# Patient Record
Sex: MALE | Race: WHITE | NOT HISPANIC OR LATINO | Employment: FULL TIME | URBAN - METROPOLITAN AREA
[De-identification: names, ages, dates, MRNs, and addresses within clinical notes are randomized per-mention and may not be internally consistent; named-entity substitution may affect disease eponyms.]

---

## 2017-01-24 ENCOUNTER — OFFICE VISIT (OUTPATIENT)
Dept: CARDIOLOGY | Facility: MEDICAL CENTER | Age: 52
End: 2017-01-24
Payer: COMMERCIAL

## 2017-01-24 VITALS
WEIGHT: 278 LBS | HEIGHT: 75 IN | SYSTOLIC BLOOD PRESSURE: 110 MMHG | HEART RATE: 72 BPM | OXYGEN SATURATION: 95 % | BODY MASS INDEX: 34.57 KG/M2 | DIASTOLIC BLOOD PRESSURE: 74 MMHG

## 2017-01-24 DIAGNOSIS — I50.30: ICD-10-CM

## 2017-01-24 DIAGNOSIS — F14.90 COCAINE USE: ICD-10-CM

## 2017-01-24 DIAGNOSIS — Z78.9 ALCOHOL USE: ICD-10-CM

## 2017-01-24 DIAGNOSIS — I50.20 ACC/AHA STAGE C SYSTOLIC HEART FAILURE (HCC): ICD-10-CM

## 2017-01-24 PROCEDURE — 99214 OFFICE O/P EST MOD 30 MIN: CPT | Performed by: NURSE PRACTITIONER

## 2017-01-24 RX ORDER — LISINOPRIL 20 MG/1
20 TABLET ORAL DAILY
Qty: 30 TAB | Refills: 11 | Status: SHIPPED | OUTPATIENT
Start: 2017-01-24 | End: 2017-01-24 | Stop reason: SDUPTHER

## 2017-01-24 RX ORDER — CARVEDILOL 3.12 MG/1
3.12 TABLET ORAL 2 TIMES DAILY WITH MEALS
Qty: 180 TAB | Refills: 3 | Status: SHIPPED | OUTPATIENT
Start: 2017-01-24 | End: 2018-04-10 | Stop reason: SDUPTHER

## 2017-01-24 RX ORDER — LISINOPRIL 20 MG/1
20 TABLET ORAL DAILY
Qty: 90 TAB | Refills: 3 | Status: SHIPPED | OUTPATIENT
Start: 2017-01-24 | End: 2017-02-27

## 2017-01-24 RX ORDER — CARVEDILOL 3.12 MG/1
3.12 TABLET ORAL 2 TIMES DAILY WITH MEALS
Qty: 60 TAB | Refills: 1 | Status: SHIPPED | OUTPATIENT
Start: 2017-01-24 | End: 2017-01-24 | Stop reason: SDUPTHER

## 2017-01-24 ASSESSMENT — ENCOUNTER SYMPTOMS
SHORTNESS OF BREATH: 0
DIZZINESS: 0
ABDOMINAL PAIN: 0
COUGH: 0
ORTHOPNEA: 0
MYALGIAS: 0
PALPITATIONS: 0
PND: 0
FEVER: 0
CLAUDICATION: 0

## 2017-01-24 NOTE — PROGRESS NOTES
Subjective:   Milton Stewart is a 51 y.o. male who presents today for f/u on Heart failure.    Patients feels well today, denies chest pain, shortness of breath, palpitations, dizziness/lightheadedness, orthopnea, PND or Edema.     Pt was complaining of some lightheadedness and fatigue and went to see his PCP last week, who told pt to stop taking his carvedilol. Pt reports his SBP was in the 90s in the office. Pt states the lightheadedness was not consistent and he attributes the symptoms to possibly due to over exercising and not having enough nutrition. Pt does not have a BP cuff and was not checking BPs at home with the lightheadeness episodes. Pt was taking his carvedilol 3.125mg daily instead of twice a day.     Pt has started exercising in the past month, doing cardio/walking 6-7 days/week and then weight lifting on Mondays/Wednesdays/Fridays. Pt denies any chest pain, SOB, palpitations with exercise. Pt feels better everyday.     Past Medical History   Diagnosis Date   • ASTHMA    • ETOH abuse      Past Surgical History   Procedure Laterality Date   • Monse by laparoscopy  2005   • Tonsillectomy  1974     Family History   Problem Relation Age of Onset   • Heart Disease Mother    • Diabetes Mother    • Kidney Disease Mother      on dialysis   • Heart Disease Father    • Other Father      mechanical injuries   • Stroke Brother    • Heart Disease Brother      History   Smoking status   • Never Smoker    Smokeless tobacco   • Not on file     No Known Allergies  Outpatient Encounter Prescriptions as of 1/24/2017   Medication Sig Dispense Refill   • lisinopril (PRINIVIL) 20 MG Tab Take 1 Tab by mouth every day. 90 Tab 3   • carvedilol (COREG) 3.125 MG Tab Take 1 Tab by mouth 2 times a day, with meals. 180 Tab 3   • ALBUTEROL INH Inhale  by mouth.     • nitroglycerin (NITROSTAT) 0.4 MG SL Tab Place 1 Tab under tongue as needed for Chest Pain (Up to 3 doses (if SBP greater than 90 mmHg)). 25 Tab 1   •  "fluticasone (FLOVENT HFA) 110 MCG/ACT Aerosol Inhale 2 Puffs by mouth 2 times a day.     • esomeprazole (NEXIUM) 20 MG capsule Take 20 mg by mouth as needed (For heartburn).     • acetaminophen (TYLENOL) 500 MG Tab Take 1,500 mg by mouth every 6 hours as needed for Moderate Pain.     • alprazolam (XANAX) 1 MG Tab Take 0.5-1 mg by mouth at bedtime as needed for Anxiety.     • [DISCONTINUED] carvedilol (COREG) 3.125 MG Tab Take 1 Tab by mouth 2 times a day, with meals. 60 Tab 1   • [DISCONTINUED] lisinopril (PRINIVIL) 20 MG Tab Take 1 Tab by mouth every day. 30 Tab 11   • [DISCONTINUED] lisinopril (PRINIVIL) 20 MG Tab Take 1 Tab by mouth every day. 30 Tab 11   • aspirin 81 MG EC tablet Take 1 Tab by mouth every day. 100 Tab 0   • [DISCONTINUED] carvedilol (COREG) 3.125 MG Tab Take 1 Tab by mouth 2 times a day, with meals. 60 Tab 1     No facility-administered encounter medications on file as of 1/24/2017.     Review of Systems   Constitutional: Positive for malaise/fatigue. Negative for fever.   Respiratory: Negative for cough and shortness of breath.    Cardiovascular: Negative for chest pain, palpitations, orthopnea, claudication, leg swelling and PND.   Gastrointestinal: Negative for abdominal pain.   Musculoskeletal: Negative for myalgias.   Neurological: Negative for dizziness.        Lightheadedness-occassionally   All other systems reviewed and are negative.       Objective:   /74 mmHg  Pulse 72  Ht 1.905 m (6' 3\")  Wt 126.1 kg (278 lb)  BMI 34.75 kg/m2  SpO2 95%    Physical Exam   Constitutional: He is oriented to person, place, and time. He appears well-developed and well-nourished.   HENT:   Head: Normocephalic and atraumatic.   Eyes: EOM are normal.   Neck: Normal range of motion. Neck supple. No JVD present.   Cardiovascular: Normal rate, regular rhythm, normal heart sounds and intact distal pulses.    No murmur heard.  Pulmonary/Chest: Effort normal and breath sounds normal. No respiratory " distress. He has no wheezes. He has no rales.   Abdominal: Soft. Bowel sounds are normal.   Musculoskeletal: Normal range of motion. He exhibits no edema.   Neurological: He is alert and oriented to person, place, and time.   Skin: Skin is warm and dry.   Psychiatric: He has a normal mood and affect.   Nursing note and vitals reviewed.    Lab Results   Component Value Date/Time    CHOLESTEROL, 12/13/2016 03:55 AM    LDL 78 12/13/2016 03:55 AM    HDL 43 12/13/2016 03:55 AM    TRIGLYCERIDES 105 12/13/2016 03:55 AM       Lab Results   Component Value Date/Time    SODIUM 137 12/13/2016 02:24 PM    POTASSIUM 3.7 12/13/2016 02:24 PM    CHLORIDE 103 12/13/2016 02:24 PM    CO2 27 12/13/2016 02:24 PM    GLUCOSE 116* 12/13/2016 02:24 PM    BUN 11 12/13/2016 02:24 PM    CREATININE 1.08 12/13/2016 02:24 PM     Lab Results   Component Value Date/Time    ALKALINE PHOSPHATASE 49 12/12/2016 09:22 AM    AST(SGOT) 32 12/12/2016 09:22 AM    ALT(SGPT) 29 12/12/2016 09:22 AM    TOTAL BILIRUBIN 1.4 12/12/2016 09:22 AM      Transthoracic Echo Report 12/13/16    Mildly reduced left ventricular systolic function.  Left ventricular ejection fraction is visually estimated to be 45%.  Moderately dilated right ventricle.  Reduced right ventricular systolic function.  The aortic valve is not well visualized but appears to open normally.  Unable to estimate pulmonary artery pressure due to an inadequate   tricuspid regurgitant jet.  Ascending aorta not well visualized.    Myocardial Perfusion Report 12/12/16      Normal myocardial perfusion with no ischemia.   Normal left ventricular wall motion.  LV ejection fraction = 48%.   ECG INTERPRETATION   Negative stress ECG for ischemia.      Assessment:     1. ACC/AHA stage C systolic heart failure (CMS-HCC)  lisinopril (PRINIVIL) 20 MG Tab    carvedilol (COREG) 3.125 MG Tab    DISCONTINUED: carvedilol (COREG) 3.125 MG Tab    DISCONTINUED: lisinopril (PRINIVIL) 20 MG Tab   2. NYHA class 1 heart  failure with borderline preserved ejection fraction (CMS-HCC)  lisinopril (PRINIVIL) 20 MG Tab    carvedilol (COREG) 3.125 MG Tab    DISCONTINUED: carvedilol (COREG) 3.125 MG Tab    DISCONTINUED: lisinopril (PRINIVIL) 20 MG Tab   3. Cocaine use     4. Alcohol use (HCC)         Medical Decision Making:  Today's Assessment / Status / Plan:   Based on physical examination findings, patient is euvolemic. No JVD, lungs are clear to auscultation, no pitting edema in bilateral lower extremities, no ascites.    1. CHF, EF 45%, Stage C, NYHA class 1:   -Restart Coreg 3.125mg BID  -cont Lisinopril 20 mg Daily  -Take BP at home and monitor for symptoms, call if problems/symptoms occur. SBP ok in the 90s if not symptoms present.  -Educated pt on the importance of medications in the management of heart failure.      2.ETOH/Cocaine use: Pt maintains sobriety since d/c from hospital.    FU in clinic in 1 month with Dr. Tesfaye. Sooner if needed.    Patient verbalizes understanding and agrees with the plan of care.     Collaborating MD: Gomez Tesfaye MD.

## 2017-01-24 NOTE — MR AVS SNAPSHOT
"        Milton Espanauhmacher   2017 8:40 AM   Office Visit   MRN: [id number]    Department:  [facility]   Dept Phone:  [phone]    Description:  Male : 1965   Provider:  BHARGAVI Ashton           Reason for Visit     Follow-Up           Allergies as of 2017     No Known Allergies      You were diagnosed with     Left ventricular systolic dysfunction   [384330]       Cocaine use   [738746]       Alcohol use (HCC)   [801614]       ACC/AHA stage C systolic heart failure (CMS-HCC)   [6372471]       NYHA class 1 heart failure with borderline preserved ejection fraction (CMS-HCC)   [9352165]         Vital Signs     Blood Pressure Pulse Height Weight Body Mass Index Oxygen Saturation    110/74 mmHg 72 1.905 m (6' 3\") 126.1 kg (278 lb) 34.75 kg/m2 95%    Smoking Status                   Never Smoker            Basic Information     Date Of Birth Sex Race Ethnicity Preferred Language    1965 Male White Non- English      Your appointments     2017  9:15 AM   Heart Failure Established with Gomez Tesfaye M.D.   [facility] (--)    [address], [address]  [facility] [id number] [phone]              Problem List              ICD-10-CM Priority Class Noted - Resolved    HTN (hypertension) I10   2016 - Present    Alcohol use (HCC) Z78.9   2016 - Present    Cocaine use F14.10   2016 - Present    Chest pain R07.9   2016 - Present    Hypokalemia E87.6   2016 - Present    Left ventricular systolic dysfunction I51.9   2016 - Present      Health Maintenance        Date Due Completion Dates    IMM DTaP/Tdap/Td Vaccine (1 - Tdap) 1983 ---    COLONOSCOPY 2014 ---    IMM INFLUENZA (1) 2016 ---            Current Immunizations     No immunizations on file.      Below and/or attached are the medications your provider expects you to take. Review all of your home medications and newly " ordered medications with your provider and/or pharmacist. Follow medication instructions as directed by your provider and/or pharmacist. Please keep your medication list with you and share with your provider. Update the information when medications are discontinued, doses are changed, or new medications (including over-the-counter products) are added; and carry medication information at all times in the event of emergency situations     Allergies:  No Known Allergies          Medications  Valid as of: January 24, 2017 -  9:32 AM    Generic Name Brand Name Tablet Size Instructions for use    Acetaminophen (Tab) TYLENOL 500 MG Take 1,500 mg by mouth every 6 hours as needed for Moderate Pain.        Albuterol   Inhale  by mouth.        ALPRAZolam (Tab) XANAX 1 MG Take 0.5-1 mg by mouth at bedtime as needed for Anxiety.        Aspirin (Tablet Delayed Response) aspirin 81 MG Take 1 Tab by mouth every day.        Carvedilol (Tab) COREG 3.125 MG Take 1 Tab by mouth 2 times a day, with meals.        Esomeprazole Magnesium (CAPSULE DELAYED RELEASE) NEXIUM 20 MG Take 20 mg by mouth as needed (For heartburn).        Fluticasone Propionate HFA (Aerosol) FLOVENT  MCG/ACT Inhale 2 Puffs by mouth 2 times a day.        Lisinopril (Tab) PRINIVIL 20 MG Take 1 Tab by mouth every day.        Nitroglycerin (SL Tab) NITROSTAT 0.4 MG Place 1 Tab under tongue as needed for Chest Pain (Up to 3 doses (if SBP greater than 90 mmHg)).        .                 Medicines prescribed today were sent to:     [id number] - [place], [place] - [address] [address]    Phone: [phone] Fax: [phone]    Open 24 Hours?: No      Medication refill instructions:       If your prescription bottle indicates you have medication refills left, it is not necessary to call your provider’s office. Please contact your pharmacy and they will refill your medication.    If your prescription bottle indicates you do not  have any refills left, you may request refills at any time through one of the following ways: The online Symcat system (except Urgent Care), by calling your provider’s office, or by asking your pharmacy to contact your provider’s office with a refill request. Medication refills are processed only during regular business hours and may not be available until the next business day. Your provider may request additional information or to have a follow-up visit with you prior to refilling your medication.   *Please Note: Medication refills are assigned a new Rx number when refilled electronically. Your pharmacy may indicate that no refills were authorized even though a new prescription for the same medication is available at the pharmacy. Please request the medicine by name with the pharmacy before contacting your provider for a refill.           Symcat Access Code: [email or web address]  Expires: 2/23/2017  9:32 AM    Symcat  A secure, online tool to manage your health information     [employer]’s Symcat® is a secure, online tool that connects you to your personalized health information from the privacy of your home -- day or night - making it very easy for you to manage your healthcare. Once the activation process is completed, you can even access your medical information using the Symcat zoey, which is available for free in the Apple Zoey store or Google Play store.     Symcat provides the following levels of access (as shown below):   My Chart Features   [facility] Doctor [facility]  Urgent  Care Non-[facility]  Primary Care  Doctor   Email your healthcare team securely and privately 24/7 X X X    Manage appointments: schedule your next appointment; view details of past/upcoming appointments X      Request prescription refills. X      View recent personal medical records, including lab and immunizations X X X X   View health record, including health history, allergies, medications X X X X    Read reports about your outpatient visits, procedures, consult and ER notes X X X X   See your discharge summary, which is a recap of your hospital and/or ER visit that includes your diagnosis, lab results, and care plan. X X       How to register for [facility]:  1. Go to  [email or web address].  2. Click on the Sign Up Now box, which takes you to the New Member Sign Up page. You will need to provide the following information:  a. Enter your [employer] [facility] Access Code exactly as it appears at the top of this page. (You will not need to use this code after you’ve completed the sign-up process. If you do not sign up before the expiration date, you must request a new code.)   b. Enter your date of birth.   c. Enter your home email address.   d. Click Submit, and follow the next screen’s instructions.  3. Create a [facility] ID. This will be your [employer] [facility] login ID and cannot be changed, so think of one that is secure and easy to remember.  4. Create a [facility] password. You can change your password at any time.  5. Enter your Password Reset Question and Answer. This can be used at a later time if you forget your password.   6. Enter your e-mail address. This allows you to receive e-mail notifications when new information is available in [facility].  7. Click Sign Up. You can now view your health information.    For assistance activating your [employer] [facility] account, call [phone]

## 2017-01-24 NOTE — Clinical Note
[facility]   [address]  [place] [address]  Phone: [phone]  Fax: [phone]              Milton Stewart  1965    Encounter Date: 1/24/2017    BHARGAVI Ashton          PROGRESS NOTE:  Subjective:   Milton Stewart is a 51 y.o. male who presents today for f/u on Heart failure.    Patients feels well today, denies chest pain, shortness of breath, palpitations, dizziness/lightheadedness, orthopnea, PND or Edema.     Pt was complaining of some lightheadedness and fatigue and went to see his PCP last week, who told pt to stop taking his carvedilol. Pt reports his SBP was in the 90s in the office. Pt states the lightheadedness was not consistent and he attributes the symptoms to possibly due to over exercising and not having enough nutrition. Pt does not have a BP cuff and was not checking BPs at home with the lightheadeness episodes. Pt was taking his carvedilol 3.125mg daily instead of twice a day.     Pt has started exercising in the past month, doing cardio/walking 6-7 days/week and then weight lifting on Mondays/Wednesdays/Fridays. Pt denies any chest pain, SOB, palpitations with exercise. Pt feels better everyday.     Past Medical History   Diagnosis Date   • ASTHMA    • ETOH abuse      Past Surgical History   Procedure Laterality Date   • Monse by laparoscopy  2005   • Tonsillectomy  1974     Family History   Problem Relation Age of Onset   • Heart Disease Mother    • Diabetes Mother    • Kidney Disease Mother      on dialysis   • Heart Disease Father    • Other Father      mechanical injuries   • Stroke Brother    • Heart Disease Brother      History   Smoking status   • Never Smoker    Smokeless tobacco   • Not on file     No Known Allergies  Outpatient Encounter Prescriptions as of 1/24/2017   Medication Sig Dispense Refill   • lisinopril (PRINIVIL) 20 MG Tab Take 1 Tab by mouth every day. 90 Tab 3   •  "carvedilol (COREG) 3.125 MG Tab Take 1 Tab by mouth 2 times a day, with meals. 180 Tab 3   • ALBUTEROL INH Inhale  by mouth.     • nitroglycerin (NITROSTAT) 0.4 MG SL Tab Place 1 Tab under tongue as needed for Chest Pain (Up to 3 doses (if SBP greater than 90 mmHg)). 25 Tab 1   • fluticasone (FLOVENT HFA) 110 MCG/ACT Aerosol Inhale 2 Puffs by mouth 2 times a day.     • esomeprazole (NEXIUM) 20 MG capsule Take 20 mg by mouth as needed (For heartburn).     • acetaminophen (TYLENOL) 500 MG Tab Take 1,500 mg by mouth every 6 hours as needed for Moderate Pain.     • alprazolam (XANAX) 1 MG Tab Take 0.5-1 mg by mouth at bedtime as needed for Anxiety.     • [DISCONTINUED] carvedilol (COREG) 3.125 MG Tab Take 1 Tab by mouth 2 times a day, with meals. 60 Tab 1   • [DISCONTINUED] lisinopril (PRINIVIL) 20 MG Tab Take 1 Tab by mouth every day. 30 Tab 11   • [DISCONTINUED] lisinopril (PRINIVIL) 20 MG Tab Take 1 Tab by mouth every day. 30 Tab 11   • aspirin 81 MG EC tablet Take 1 Tab by mouth every day. 100 Tab 0   • [DISCONTINUED] carvedilol (COREG) 3.125 MG Tab Take 1 Tab by mouth 2 times a day, with meals. 60 Tab 1     No facility-administered encounter medications on file as of 1/24/2017.     Review of Systems   Constitutional: Positive for malaise/fatigue. Negative for fever.   Respiratory: Negative for cough and shortness of breath.    Cardiovascular: Negative for chest pain, palpitations, orthopnea, claudication, leg swelling and PND.   Gastrointestinal: Negative for abdominal pain.   Musculoskeletal: Negative for myalgias.   Neurological: Negative for dizziness.        Lightheadedness-occassionally   All other systems reviewed and are negative.       Objective:   /74 mmHg  Pulse 72  Ht 1.905 m (6' 3\")  Wt 126.1 kg (278 lb)  BMI 34.75 kg/m2  SpO2 95%    Physical Exam   Constitutional: He is oriented to person, place, and time. He appears well-developed and well-nourished.   HENT:   Head: Normocephalic and " atraumatic.   Eyes: EOM are normal.   Neck: Normal range of motion. Neck supple. No JVD present.   Cardiovascular: Normal rate, regular rhythm, normal heart sounds and intact distal pulses.    No murmur heard.  Pulmonary/Chest: Effort normal and breath sounds normal. No respiratory distress. He has no wheezes. He has no rales.   Abdominal: Soft. Bowel sounds are normal.   Musculoskeletal: Normal range of motion. He exhibits no edema.   Neurological: He is alert and oriented to person, place, and time.   Skin: Skin is warm and dry.   Psychiatric: He has a normal mood and affect.   Nursing note and vitals reviewed.    Lab Results   Component Value Date/Time    CHOLESTEROL, 12/13/2016 03:55 AM    LDL 78 12/13/2016 03:55 AM    HDL 43 12/13/2016 03:55 AM    TRIGLYCERIDES 105 12/13/2016 03:55 AM       Lab Results   Component Value Date/Time    SODIUM 137 12/13/2016 02:24 PM    POTASSIUM 3.7 12/13/2016 02:24 PM    CHLORIDE 103 12/13/2016 02:24 PM    CO2 27 12/13/2016 02:24 PM    GLUCOSE 116* 12/13/2016 02:24 PM    BUN 11 12/13/2016 02:24 PM    CREATININE 1.08 12/13/2016 02:24 PM     Lab Results   Component Value Date/Time    ALKALINE PHOSPHATASE 49 12/12/2016 09:22 AM    AST(SGOT) 32 12/12/2016 09:22 AM    ALT(SGPT) 29 12/12/2016 09:22 AM    TOTAL BILIRUBIN 1.4 12/12/2016 09:22 AM      Transthoracic Echo Report 12/13/16    Mildly reduced left ventricular systolic function.  Left ventricular ejection fraction is visually estimated to be 45%.  Moderately dilated right ventricle.  Reduced right ventricular systolic function.  The aortic valve is not well visualized but appears to open normally.  Unable to estimate pulmonary artery pressure due to an inadequate   tricuspid regurgitant jet.  Ascending aorta not well visualized.    Myocardial Perfusion Report 12/12/16      Normal myocardial perfusion with no ischemia.   Normal left ventricular wall motion.  LV ejection fraction = 48%.   ECG INTERPRETATION   Negative  stress ECG for ischemia.      Assessment:     1. ACC/AHA stage C systolic heart failure (CMS-HCC)  lisinopril (PRINIVIL) 20 MG Tab    carvedilol (COREG) 3.125 MG Tab    DISCONTINUED: carvedilol (COREG) 3.125 MG Tab    DISCONTINUED: lisinopril (PRINIVIL) 20 MG Tab   2. NYHA class 1 heart failure with borderline preserved ejection fraction (CMS-HCC)  lisinopril (PRINIVIL) 20 MG Tab    carvedilol (COREG) 3.125 MG Tab    DISCONTINUED: carvedilol (COREG) 3.125 MG Tab    DISCONTINUED: lisinopril (PRINIVIL) 20 MG Tab   3. Cocaine use     4. Alcohol use (HCC)         Medical Decision Making:  Today's Assessment / Status / Plan:   Based on physical examination findings, patient is euvolemic. No JVD, lungs are clear to auscultation, no pitting edema in bilateral lower extremities, no ascites.    1. CHF, EF 45%, Stage C, NYHA class 1:   -Restart Coreg 3.125mg BID  -cont Lisinopril 20 mg Daily  -Take BP at home and monitor for symptoms, call if problems/symptoms occur. SBP ok in the 90s if not symptoms present.  -Educated pt on the importance of medications in the management of heart failure.      2.ETOH/Cocaine use: Pt maintains sobriety since d/c from hospital.    FU in clinic in 1 month with Dr. Tesfaye. Sooner if needed.    Patient verbalizes understanding and agrees with the plan of care.     Collaborating MD: Gomez Tesfaye MD.      Tam Rg M.D.  [address] [facility]  [address] [place] [id number]  VIA Facsimile: [phone]

## 2017-02-27 ENCOUNTER — OFFICE VISIT (OUTPATIENT)
Dept: CARDIOLOGY | Facility: MEDICAL CENTER | Age: 52
End: 2017-02-27
Payer: COMMERCIAL

## 2017-02-27 VITALS
SYSTOLIC BLOOD PRESSURE: 140 MMHG | BODY MASS INDEX: 33.57 KG/M2 | HEIGHT: 75 IN | HEART RATE: 74 BPM | WEIGHT: 270 LBS | OXYGEN SATURATION: 94 % | DIASTOLIC BLOOD PRESSURE: 82 MMHG

## 2017-02-27 DIAGNOSIS — I51.9 LEFT VENTRICULAR SYSTOLIC DYSFUNCTION: ICD-10-CM

## 2017-02-27 DIAGNOSIS — I10 ESSENTIAL HYPERTENSION: ICD-10-CM

## 2017-02-27 DIAGNOSIS — F14.90 COCAINE USE: ICD-10-CM

## 2017-02-27 DIAGNOSIS — Z78.9 ALCOHOL USE: ICD-10-CM

## 2017-02-27 DIAGNOSIS — E87.6 HYPOKALEMIA: ICD-10-CM

## 2017-02-27 DIAGNOSIS — I20.9 ISCHEMIC CHEST PAIN (HCC): ICD-10-CM

## 2017-02-27 PROCEDURE — 99214 OFFICE O/P EST MOD 30 MIN: CPT | Performed by: INTERNAL MEDICINE

## 2017-02-27 RX ORDER — LISINOPRIL 40 MG/1
40 TABLET ORAL DAILY
Qty: 30 TAB | Refills: 11 | Status: SHIPPED | OUTPATIENT
Start: 2017-02-27 | End: 2018-03-05 | Stop reason: SDUPTHER

## 2017-02-27 ASSESSMENT — ENCOUNTER SYMPTOMS
WEAKNESS: 0
SPUTUM PRODUCTION: 0
HEMOPTYSIS: 0
WHEEZING: 0
COUGH: 0
FEVER: 0
EYES NEGATIVE: 1
PALPITATIONS: 0
CONSTITUTIONAL NEGATIVE: 1
STRIDOR: 0
MUSCULOSKELETAL NEGATIVE: 1
CARDIOVASCULAR NEGATIVE: 1
NEUROLOGICAL NEGATIVE: 1
LOSS OF CONSCIOUSNESS: 0
CLAUDICATION: 0
ORTHOPNEA: 0
PND: 0
CHILLS: 0
SORE THROAT: 0
BRUISES/BLEEDS EASILY: 0
RESPIRATORY NEGATIVE: 1
GASTROINTESTINAL NEGATIVE: 1
DIZZINESS: 0
SHORTNESS OF BREATH: 0

## 2017-02-27 NOTE — Clinical Note
[facility]   [address]  [place] [address]  Phone: [phone]  Fax: [phone]              Milton Stewart  1965    Encounter Date: 2/27/2017    Gomez Tesfaye M.D.          PROGRESS NOTE:  Subjective:   Milton Stewart is a 51 y.o. 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 He is checking his blood pressure home which normally range in the 120-140 range. His pulse ranges 58-68. He has fitbit that he uses to monitor his heart rate. He would like to recheck his ejection fraction at this point. He reports sobriety from illicit substances.    Past Medical History   Diagnosis Date   • ASTHMA    • ETOH abuse      Past Surgical History   Procedure Laterality Date   • Monse by laparoscopy  2005   • Tonsillectomy  1974     Family History   Problem Relation Age of Onset   • Heart Disease Mother    • Diabetes Mother    • Kidney Disease Mother      on dialysis   • Heart Disease Father    • Other Father      mechanical injuries   • Stroke Brother    • Heart Disease Brother      History   Smoking status   • Never Smoker    Smokeless tobacco   • Not on file     No Known Allergies  Outpatient Encounter Prescriptions as of 2/27/2017   Medication Sig Dispense Refill   • lisinopril (PRINIVIL, ZESTRIL) 40 MG tablet Take 1 Tab by mouth every day. 30 Tab 11   • carvedilol (COREG) 3.125 MG Tab Take 1 Tab by mouth 2 times a day, with meals. 180 Tab 3   • aspirin 81 MG EC tablet Take 1 Tab by mouth every day. 100 Tab 0   • fluticasone (FLOVENT HFA) 110 MCG/ACT Aerosol Inhale 2 Puffs by mouth 2 times a day.     • [DISCONTINUED] lisinopril (PRINIVIL) 20 MG Tab Take 1 Tab by mouth every day. 90 Tab 3   • ALBUTEROL INH Inhale  by mouth.     • nitroglycerin (NITROSTAT) 0.4 MG SL Tab Place 1 Tab under tongue as needed for Chest Pain (Up to 3 doses (if SBP greater than 90 mmHg)). 25 Tab 1   • esomeprazole (NEXIUM)  "20 MG capsule Take 20 mg by mouth as needed (For heartburn).     • acetaminophen (TYLENOL) 500 MG Tab Take 1,500 mg by mouth every 6 hours as needed for Moderate Pain.     • alprazolam (XANAX) 1 MG Tab Take 0.5-1 mg by mouth at bedtime as needed for Anxiety.       No facility-administered encounter medications on file as of 2/27/2017.     Review of Systems   Constitutional: Negative.  Negative for fever, chills and malaise/fatigue.   HENT: Negative.  Negative for sore throat.    Eyes: Negative.    Respiratory: Negative.  Negative for cough, hemoptysis, sputum production, shortness of breath, wheezing and stridor.    Cardiovascular: Negative.  Negative for chest pain, palpitations, orthopnea, claudication, leg swelling and PND.   Gastrointestinal: Negative.    Genitourinary: Negative.    Musculoskeletal: Negative.    Skin: Negative.    Neurological: Negative.  Negative for dizziness, loss of consciousness and weakness.   Endo/Heme/Allergies: Negative.  Does not bruise/bleed easily.   All other systems reviewed and are negative.       Objective:   /82 mmHg  Pulse 74  Ht 1.905 m (6' 3\")  Wt 122.471 kg (270 lb)  BMI 33.75 kg/m2  SpO2 94%    Physical Exam   Constitutional: He is oriented to person, place, and time. He appears well-developed and well-nourished. No distress.   HENT:   Head: Normocephalic.   Mouth/Throat: Oropharynx is clear and moist.   Eyes: EOM are normal. Pupils are equal, round, and reactive to light. Right eye exhibits no discharge. Left eye exhibits no discharge. No scleral icterus.   Neck: Normal range of motion. Neck supple. No JVD present. No tracheal deviation present.   Cardiovascular: Normal rate, regular rhythm, S1 normal, S2 normal, normal heart sounds, intact distal pulses and normal pulses.  Exam reveals no gallop, no S3, no S4 and no friction rub.    No murmur heard.   No systolic murmur is present    No diastolic murmur is present   Pulses:       Carotid pulses are 2+ on the " right side, and 2+ on the left side.       Radial pulses are 2+ on the right side, and 2+ on the left side.        Dorsalis pedis pulses are 2+ on the right side, and 2+ on the left side.        Posterior tibial pulses are 2+ on the right side, and 2+ on the left side.   Pulmonary/Chest: Effort normal and breath sounds normal. No respiratory distress. He has no wheezes. He has no rales.   Abdominal: Soft. Bowel sounds are normal. He exhibits no distension and no mass. There is no tenderness. There is no rebound and no guarding.   Musculoskeletal: He exhibits no edema.   Neurological: He is alert and oriented to person, place, and time. No cranial nerve deficit.   Skin: Skin is warm and dry. He is not diaphoretic. No pallor.   Psychiatric: He has a normal mood and affect. His behavior is normal. Judgment and thought content normal.   Nursing note and vitals reviewed.      Assessment:     1. Ischemic chest pain (CMS-HCC)  NM-CARDIAC PET    lisinopril (PRINIVIL, ZESTRIL) 40 MG tablet   2. Cocaine use     3. Essential hypertension  lisinopril (PRINIVIL, ZESTRIL) 40 MG tablet   4. Hypokalemia  lisinopril (PRINIVIL, ZESTRIL) 40 MG tablet   5. Left ventricular systolic dysfunction  lisinopril (PRINIVIL, ZESTRIL) 40 MG tablet   6. 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 We'll obtain a cardiac PET CT scan. Additionally I will increase his lisinopril to 40 mg per day. I will see him back in 3 months with results of his PET CT scan in between. In the meantime I'll increase his lisinopril to 40.    1. CHFrEF 45%, NYHA 1, Stage C, Hemo Pro A    - cont coreg 3.125 BID    - increase lisinopril to 40 mg once daily    - Hydral/Imdur - not indicated    - ICD - not indicated    - BiV - not indicated    2. ETOH    - encourage  sobriety    3. Cocaine      - encourage sobriety    Thank for you allowing me to take part in your patient's care, please call should you have any questions or would like to discuss this patient.      Tam Rg M.D.  [address] [address]  VIA Facsimile: [phone]

## 2017-02-27 NOTE — MR AVS SNAPSHOT
"        Milton Stewart   2017 9:15 AM   Office Visit   MRN: [id number]    Department:  [facility]   Dept Phone:  [phone]    Description:  Male : 1965   Provider:  Gomez Tesfaye M.D.           Reason for Visit     Follow-Up HF       Allergies as of 2017     No Known Allergies      You were diagnosed with     Ischemic chest pain (CMS-HCC)   [087760]       Cocaine use   [607231]       Essential hypertension   [2036341]       Hypokalemia   [504119]       Left ventricular systolic dysfunction   [864252]       Alcohol use (HCC)   [300697]         Vital Signs     Blood Pressure Pulse Height Weight Body Mass Index Oxygen Saturation    140/82 mmHg 74 1.905 m (6' 3\") 122.471 kg (270 lb) 33.75 kg/m2 94%    Smoking Status                   Never Smoker            Basic Information     Date Of Birth Sex Race Ethnicity Preferred Language    1965 Male White Non- English      Problem List              ICD-10-CM Priority Class Noted - Resolved    HTN (hypertension) I10   2016 - Present    Alcohol use (HCC) Z78.9   2016 - Present    Cocaine use F14.10   2016 - Present    Chest pain R07.9   2016 - Present    Hypokalemia E87.6   2016 - Present    Left ventricular systolic dysfunction I51.9   2016 - Present      Health Maintenance        Date Due Completion Dates    IMM DTaP/Tdap/Td Vaccine (1 - Tdap) 1983 ---    COLONOSCOPY 2014 ---    IMM INFLUENZA (1) 2016 ---            Current Immunizations     No immunizations on file.      Below and/or attached are the medications your provider expects you to take. Review all of your home medications and newly ordered medications with your provider and/or pharmacist. Follow medication instructions as directed by your provider and/or pharmacist. Please keep your medication list with you and share with your provider. Update the information when medications are discontinued, doses are changed, " or new medications (including over-the-counter products) are added; and carry medication information at all times in the event of emergency situations     Allergies:  No Known Allergies          Medications  Valid as of: February 27, 2017 -  9:45 AM    Generic Name Brand Name Tablet Size Instructions for use    Acetaminophen (Tab) TYLENOL 500 MG Take 1,500 mg by mouth every 6 hours as needed for Moderate Pain.        Albuterol   Inhale  by mouth.        ALPRAZolam (Tab) XANAX 1 MG Take 0.5-1 mg by mouth at bedtime as needed for Anxiety.        Aspirin (Tablet Delayed Response) aspirin 81 MG Take 1 Tab by mouth every day.        Carvedilol (Tab) COREG 3.125 MG Take 1 Tab by mouth 2 times a day, with meals.        Esomeprazole Magnesium (CAPSULE DELAYED RELEASE) NEXIUM 20 MG Take 20 mg by mouth as needed (For heartburn).        Fluticasone Propionate HFA (Aerosol) FLOVENT  MCG/ACT Inhale 2 Puffs by mouth 2 times a day.        Lisinopril (Tab) PRINIVIL, ZESTRIL 40 MG Take 1 Tab by mouth every day.        Nitroglycerin (SL Tab) NITROSTAT 0.4 MG Place 1 Tab under tongue as needed for Chest Pain (Up to 3 doses (if SBP greater than 90 mmHg)).        .                 Medicines prescribed today were sent to:     [id number] - [place], [place] - [address] [address]    Phone: [phone] Fax: [phone]    Open 24 Hours?: No      Medication refill instructions:       If your prescription bottle indicates you have medication refills left, it is not necessary to call your provider’s office. Please contact your pharmacy and they will refill your medication.    If your prescription bottle indicates you do not have any refills left, you may request refills at any time through one of the following ways: The online Intrallect system (except Urgent Care), by calling your provider’s office, or by asking your pharmacy to contact your provider’s office with a refill request. Medication  refills are processed only during regular business hours and may not be available until the next business day. Your provider may request additional information or to have a follow-up visit with you prior to refilling your medication.   *Please Note: Medication refills are assigned a new Rx number when refilled electronically. Your pharmacy may indicate that no refills were authorized even though a new prescription for the same medication is available at the pharmacy. Please request the medicine by name with the pharmacy before contacting your provider for a refill.        Your To Do List     Future Labs/Procedures Complete By Expires    NM-CARDIAC PET  As directed 2/27/2018         CityNews Access Code: [email or web address]  Expires: 3/29/2017  9:45 AM    CityNews  A secure, online tool to manage your health information     [employer]’s CityNews® is a secure, online tool that connects you to your personalized health information from the privacy of your home -- day or night - making it very easy for you to manage your healthcare. Once the activation process is completed, you can even access your medical information using the CityNews zoey, which is available for free in the Apple Zoey store or Google Play store.     CityNews provides the following levels of access (as shown below):   My Chart Features   [facility] Doctor [facility]  Urgent  Care Non-[facility]  Primary Care  Doctor   Email your healthcare team securely and privately 24/7 X X X    Manage appointments: schedule your next appointment; view details of past/upcoming appointments X      Request prescription refills. X      View recent personal medical records, including lab and immunizations X X X X   View health record, including health history, allergies, medications X X X X   Read reports about your outpatient visits, procedures, consult and ER notes X X X X   See your discharge summary, which is a recap of your hospital and/or ER visit  that includes your diagnosis, lab results, and care plan. X X       How to register for [facility]:  1. Go to  [email or web address].  2. Click on the Sign Up Now box, which takes you to the New Member Sign Up page. You will need to provide the following information:  a. Enter your [facility] Access Code exactly as it appears at the top of this page. (You will not need to use this code after you’ve completed the sign-up process. If you do not sign up before the expiration date, you must request a new code.)   b. Enter your date of birth.   c. Enter your home email address.   d. Click Submit, and follow the next screen’s instructions.  3. Create a [employer]ist ID. This will be your [employer] login ID and cannot be changed, so think of one that is secure and easy to remember.  4. Create a [employer]ist password. You can change your password at any time.  5. Enter your Password Reset Question and Answer. This can be used at a later time if you forget your password.   6. Enter your e-mail address. This allows you to receive e-mail notifications when new information is available in [facility].  7. Click Sign Up. You can now view your health information.    For assistance activating your [facility] account, call [phone]

## 2017-02-27 NOTE — PROGRESS NOTES
Subjective:   Milton Stewart is a 51 y.o. 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 He is checking his blood pressure home which normally range in the 120-140 range. His pulse ranges 58-68. He has fitbit that he uses to monitor his heart rate. He would like to recheck his ejection fraction at this point. He reports sobriety from illicit substances.    Past Medical History   Diagnosis Date   • ASTHMA    • ETOH abuse      Past Surgical History   Procedure Laterality Date   • Monse by laparoscopy  2005   • Tonsillectomy  1974     Family History   Problem Relation Age of Onset   • Heart Disease Mother    • Diabetes Mother    • Kidney Disease Mother      on dialysis   • Heart Disease Father    • Other Father      mechanical injuries   • Stroke Brother    • Heart Disease Brother      History   Smoking status   • Never Smoker    Smokeless tobacco   • Not on file     No Known Allergies  Outpatient Encounter Prescriptions as of 2/27/2017   Medication Sig Dispense Refill   • lisinopril (PRINIVIL, ZESTRIL) 40 MG tablet Take 1 Tab by mouth every day. 30 Tab 11   • carvedilol (COREG) 3.125 MG Tab Take 1 Tab by mouth 2 times a day, with meals. 180 Tab 3   • aspirin 81 MG EC tablet Take 1 Tab by mouth every day. 100 Tab 0   • fluticasone (FLOVENT HFA) 110 MCG/ACT Aerosol Inhale 2 Puffs by mouth 2 times a day.     • [DISCONTINUED] lisinopril (PRINIVIL) 20 MG Tab Take 1 Tab by mouth every day. 90 Tab 3   • ALBUTEROL INH Inhale  by mouth.     • nitroglycerin (NITROSTAT) 0.4 MG SL Tab Place 1 Tab under tongue as needed for Chest Pain (Up to 3 doses (if SBP greater than 90 mmHg)). 25 Tab 1   • esomeprazole (NEXIUM) 20 MG capsule Take 20 mg by mouth as needed (For heartburn).     • acetaminophen (TYLENOL) 500 MG Tab Take 1,500 mg by mouth every 6 hours as needed for Moderate Pain.     • alprazolam (XANAX) 1 MG Tab Take 0.5-1 mg by mouth at bedtime as needed for Anxiety.       No  "facility-administered encounter medications on file as of 2/27/2017.     Review of Systems   Constitutional: Negative.  Negative for fever, chills and malaise/fatigue.   HENT: Negative.  Negative for sore throat.    Eyes: Negative.    Respiratory: Negative.  Negative for cough, hemoptysis, sputum production, shortness of breath, wheezing and stridor.    Cardiovascular: Negative.  Negative for chest pain, palpitations, orthopnea, claudication, leg swelling and PND.   Gastrointestinal: Negative.    Genitourinary: Negative.    Musculoskeletal: Negative.    Skin: Negative.    Neurological: Negative.  Negative for dizziness, loss of consciousness and weakness.   Endo/Heme/Allergies: Negative.  Does not bruise/bleed easily.   All other systems reviewed and are negative.       Objective:   /82 mmHg  Pulse 74  Ht 1.905 m (6' 3\")  Wt 122.471 kg (270 lb)  BMI 33.75 kg/m2  SpO2 94%    Physical Exam   Constitutional: He is oriented to person, place, and time. He appears well-developed and well-nourished. No distress.   HENT:   Head: Normocephalic.   Mouth/Throat: Oropharynx is clear and moist.   Eyes: EOM are normal. Pupils are equal, round, and reactive to light. Right eye exhibits no discharge. Left eye exhibits no discharge. No scleral icterus.   Neck: Normal range of motion. Neck supple. No JVD present. No tracheal deviation present.   Cardiovascular: Normal rate, regular rhythm, S1 normal, S2 normal, normal heart sounds, intact distal pulses and normal pulses.  Exam reveals no gallop, no S3, no S4 and no friction rub.    No murmur heard.   No systolic murmur is present    No diastolic murmur is present   Pulses:       Carotid pulses are 2+ on the right side, and 2+ on the left side.       Radial pulses are 2+ on the right side, and 2+ on the left side.        Dorsalis pedis pulses are 2+ on the right side, and 2+ on the left side.        Posterior tibial pulses are 2+ on the right side, and 2+ on the left side. "   Pulmonary/Chest: Effort normal and breath sounds normal. No respiratory distress. He has no wheezes. He has no rales.   Abdominal: Soft. Bowel sounds are normal. He exhibits no distension and no mass. There is no tenderness. There is no rebound and no guarding.   Musculoskeletal: He exhibits no edema.   Neurological: He is alert and oriented to person, place, and time. No cranial nerve deficit.   Skin: Skin is warm and dry. He is not diaphoretic. No pallor.   Psychiatric: He has a normal mood and affect. His behavior is normal. Judgment and thought content normal.   Nursing note and vitals reviewed.      Assessment:     1. Ischemic chest pain (CMS-HCC)  NM-CARDIAC PET    lisinopril (PRINIVIL, ZESTRIL) 40 MG tablet   2. Cocaine use     3. Essential hypertension  lisinopril (PRINIVIL, ZESTRIL) 40 MG tablet   4. Hypokalemia  lisinopril (PRINIVIL, ZESTRIL) 40 MG tablet   5. Left ventricular systolic dysfunction  lisinopril (PRINIVIL, ZESTRIL) 40 MG tablet   6. 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 We'll obtain a cardiac PET CT scan. Additionally I will increase his lisinopril to 40 mg per day. I will see him back in 3 months with results of his PET CT scan in between. In the meantime I'll increase his lisinopril to 40.    1. CHFrEF 45%, NYHA 1, Stage C, Hemo Pro A    - cont coreg 3.125 BID    - increase lisinopril to 40 mg once daily    - Hydral/Imdur - not indicated    - ICD - not indicated    - BiV - not indicated    2. ETOH    - encourage sobriety    3. Cocaine      - encourage sobriety    Thank for you allowing me to take part in your patient's care, please call should you have any questions or would like to discuss this patient.

## 2017-04-05 ENCOUNTER — APPOINTMENT (OUTPATIENT)
Dept: RADIOLOGY | Facility: MEDICAL CENTER | Age: 52
End: 2017-04-05
Attending: INTERNAL MEDICINE
Payer: COMMERCIAL

## 2017-04-17 ENCOUNTER — HOSPITAL ENCOUNTER (OUTPATIENT)
Dept: RADIOLOGY | Facility: MEDICAL CENTER | Age: 52
End: 2017-04-17
Attending: INTERNAL MEDICINE
Payer: COMMERCIAL

## 2017-04-17 DIAGNOSIS — I20.9 ISCHEMIC CHEST PAIN (HCC): ICD-10-CM

## 2017-04-17 PROCEDURE — A9555 RB82 RUBIDIUM: HCPCS

## 2017-04-17 PROCEDURE — 700111 HCHG RX REV CODE 636 W/ 250 OVERRIDE (IP)

## 2017-04-18 NOTE — PROCEDURES
REFERRING PHYSICIAN:  Dr. Tesfaye.    AGE:  51.  GENDER:  Male.  HEIGHT:  75 inches.  WEIGHT:  270 pounds.    INDICATIONS:  Cardiomyopathy.    PROCEDURE:  The patient reviewed and signed the acknowledgement for testing   form.  The patient was in a fasting state and was properly prepared for   testing.  An intravenous line was inserted and a flush of normal saline   followed to insure line patency.    A transmission scan was acquired for attenuation correction using the internal   Germanium sources.  The patient was then administered 28.1 mCi of   Rubidium-82.  Approximately 90 seconds after the infusion, resting imagine   were obtained with ECG-gating.  Following the resting series, the patient   administered 60 mg of dipyridamole over four minutes.  The blood pressure,   heart rate and ECG were monitored and recorded.  After the dipyridamole   infusion was completed, another transmission scan for attenuation correction   was obtained.  The patient was then administered 27.9 mCi of Rubidium-82.    Approximately 90 seconds after the infusion, peak stress images were obtained   with ECG-gating.    CLINICAL RESPONSE:  Resting blood pressure was 131/93 with a heart rate of 63.    Immediately post-dipyridamole infusion the blood pressure was 119/61 with a   heart rate of 81.  After a recovery period the blood pressure was 137/70 with   a heart rate of 84.    The patient experienced flushing and headache during testing.    Aminophylline 100 mg was administered following the scan.    ELECTROCARDIOGRAPHIC FINDINGS:  Baseline electrocardiogram shows sinus   mechanism with nonspecific ST flattening, there were no specific changes noted   on pharmacologic stress and no arrhythmias.    SCINTOGRAPHIC FINDINGS:  There is normal perfusion of the left ventricle with   stress and rest.  There is no significant evidence of transient dilation of   the left ventricle with stress.    GATED WALL MOTION FINDINGS:  Left ventricular function  by visual inspection,   it appears normal globally and segmentally.  However, the resting ejection   fraction is 44% calculated; this seems to be due because of basal septal   hypokinesis.  Estimated ejection fraction on visual inspection appears more to   be in the 55% range.  Calculated ejection fraction is appropriately augmented   to 67%.    IMPRESSION:  1.  Normal perfusion without evidence for prior infarction or ischemia.  2.  Calculated ejection fraction appears slightly low, however, on visual   inspection, it is estimated at 55%.       ____________________________________     MD EDUARDA SANFORD / NIKKIE    DD:  04/17/2017 17:21:45  DT:  04/17/2017 19:26:46    D#:  [id number]  Job#:  [id number]

## 2017-06-01 ENCOUNTER — HOSPITAL ENCOUNTER (OUTPATIENT)
Dept: LAB | Facility: MEDICAL CENTER | Age: 52
End: 2017-06-01
Attending: INTERNAL MEDICINE
Payer: COMMERCIAL

## 2017-06-01 ENCOUNTER — OFFICE VISIT (OUTPATIENT)
Dept: CARDIOLOGY | Facility: MEDICAL CENTER | Age: 52
End: 2017-06-01
Payer: COMMERCIAL

## 2017-06-01 VITALS
DIASTOLIC BLOOD PRESSURE: 90 MMHG | WEIGHT: 284 LBS | HEIGHT: 75 IN | OXYGEN SATURATION: 95 % | SYSTOLIC BLOOD PRESSURE: 146 MMHG | HEART RATE: 78 BPM | BODY MASS INDEX: 35.31 KG/M2

## 2017-06-01 DIAGNOSIS — I20.89 STABLE ANGINA PECTORIS: ICD-10-CM

## 2017-06-01 DIAGNOSIS — Z78.9 ALCOHOL USE: ICD-10-CM

## 2017-06-01 DIAGNOSIS — E87.6 HYPOKALEMIA: ICD-10-CM

## 2017-06-01 DIAGNOSIS — I51.9 LEFT VENTRICULAR SYSTOLIC DYSFUNCTION: ICD-10-CM

## 2017-06-01 DIAGNOSIS — I10 ESSENTIAL HYPERTENSION: ICD-10-CM

## 2017-06-01 DIAGNOSIS — F14.90 COCAINE USE: ICD-10-CM

## 2017-06-01 DIAGNOSIS — R06.02 SOB (SHORTNESS OF BREATH): ICD-10-CM

## 2017-06-01 LAB
BASOPHILS # BLD AUTO: 0.7 % (ref 0–1.8)
BASOPHILS # BLD: 0.05 K/UL (ref 0–0.12)
EOSINOPHIL # BLD AUTO: 0.27 K/UL (ref 0–0.51)
EOSINOPHIL NFR BLD: 3.9 % (ref 0–6.9)
ERYTHROCYTE [DISTWIDTH] IN BLOOD BY AUTOMATED COUNT: 43.4 FL (ref 35.9–50)
HCT VFR BLD AUTO: 53.1 % (ref 42–52)
HGB BLD-MCNC: 18.5 G/DL (ref 14–18)
IMM GRANULOCYTES # BLD AUTO: 0.02 K/UL (ref 0–0.11)
IMM GRANULOCYTES NFR BLD AUTO: 0.3 % (ref 0–0.9)
IRON SATN MFR SERPL: 42 % (ref 15–55)
IRON SERPL-MCNC: 139 UG/DL (ref 50–180)
LYMPHOCYTES # BLD AUTO: 1.17 K/UL (ref 1–4.8)
LYMPHOCYTES NFR BLD: 16.9 % (ref 22–41)
MCH RBC QN AUTO: 31.3 PG (ref 27–33)
MCHC RBC AUTO-ENTMCNC: 34.8 G/DL (ref 33.7–35.3)
MCV RBC AUTO: 89.7 FL (ref 81.4–97.8)
MONOCYTES # BLD AUTO: 0.66 K/UL (ref 0–0.85)
MONOCYTES NFR BLD AUTO: 9.5 % (ref 0–13.4)
NEUTROPHILS # BLD AUTO: 4.75 K/UL (ref 1.82–7.42)
NEUTROPHILS NFR BLD: 68.7 % (ref 44–72)
NRBC # BLD AUTO: 0 K/UL
NRBC BLD AUTO-RTO: 0 /100 WBC
RBC # BLD AUTO: 5.92 M/UL (ref 4.7–6.1)
TIBC SERPL-MCNC: 333 UG/DL (ref 250–450)
WBC # BLD AUTO: 6.9 K/UL (ref 4.8–10.8)

## 2017-06-01 PROCEDURE — 36415 COLL VENOUS BLD VENIPUNCTURE: CPT

## 2017-06-01 PROCEDURE — 94620 PR PULMONARY STRESS TESTING,SIMPLE: CPT | Performed by: INTERNAL MEDICINE

## 2017-06-01 PROCEDURE — 85014 HEMATOCRIT: CPT

## 2017-06-01 PROCEDURE — 99214 OFFICE O/P EST MOD 30 MIN: CPT | Mod: 25 | Performed by: INTERNAL MEDICINE

## 2017-06-01 PROCEDURE — 83550 IRON BINDING TEST: CPT

## 2017-06-01 PROCEDURE — 85048 AUTOMATED LEUKOCYTE COUNT: CPT

## 2017-06-01 PROCEDURE — 83540 ASSAY OF IRON: CPT

## 2017-06-01 PROCEDURE — 81256 HFE GENE: CPT

## 2017-06-01 PROCEDURE — 85018 HEMOGLOBIN: CPT

## 2017-06-01 PROCEDURE — 85041 AUTOMATED RBC COUNT: CPT

## 2017-06-01 ASSESSMENT — ENCOUNTER SYMPTOMS
CARDIOVASCULAR NEGATIVE: 1
PND: 0
PALPITATIONS: 0
NEUROLOGICAL NEGATIVE: 1
MUSCULOSKELETAL NEGATIVE: 1
DIZZINESS: 0
SORE THROAT: 0
GASTROINTESTINAL NEGATIVE: 1
HEMOPTYSIS: 0
WHEEZING: 0
RESPIRATORY NEGATIVE: 1
STRIDOR: 0
BRUISES/BLEEDS EASILY: 0
CHILLS: 0
SHORTNESS OF BREATH: 0
CONSTITUTIONAL NEGATIVE: 1
EYES NEGATIVE: 1
ORTHOPNEA: 0
FEVER: 0
CLAUDICATION: 0
COUGH: 0
LOSS OF CONSCIOUSNESS: 0
SPUTUM PRODUCTION: 0
WEAKNESS: 0

## 2017-06-01 ASSESSMENT — MINNESOTA LIVING WITH HEART FAILURE QUESTIONNAIRE (MLHF)
DIFFICULTY WITH RECREATIONAL PASTIMES, SPORTS, HOBBIES: 0
TOTAL_SCORE: 2
MAKING YOU STAY IN A HOSPITAL: 0
MAKING YOU SHORT OF BREATH: 0
WALKING ABOUT OR CLIMBING STAIRS DIFFICULT: 0
EATING LESS FOODS YOU LIKE: 0
DIFFICULTY TO CONCENTRATE OR REMEMBERING THINGS: 0
MAKING YOU FEEL DEPRESSED: 0
DIFFICULTY SLEEPING WELL AT NIGHT: 0
DIFFICULTY WITH SEXUAL ACTIVITIES: 0
COSTING YOU MONEY FOR MEDICAL CARE: 0
DIFFICULTY GOING AWAY FROM HOME: 0
HAVING TO SIT OR LIE DOWN DURING THE DAY: 0
WORKING AROUND THE HOUSE OR YARD DIFFICULT: 0
TIRED, FATIGUED OR LOW ON ENERGY: 0
MAKING YOU WORRY: 2
FEELING LIKE A BURDEN TO FAMILY AND FRIENDS: 0
LOSS OF SELF CONTROL IN YOUR LIFE: 0
DIFFICULTY WORKING TO EARN A LIVING: 0
DIFFICULTY SOCIALIZING WITH FAMILY OR FRIENDS: 0
SWELLING IN ANKLES OR LEGS: 0
GIVING YOU SIDE EFFECTS FROM TREATMENTS: 0

## 2017-06-01 ASSESSMENT — NEW YORK HEART ASSOCIATION (NYHA) CLASSIFICATION: NYHA FUNCTIONAL CLASS: CLASS L

## 2017-06-01 ASSESSMENT — 6 MINUTE WALK TEST (6MWT): TOTAL DISTANCE WALKED (METERS): 535

## 2017-06-01 NOTE — Clinical Note
[facility]   [address], [address]  [place] [address]  Phone: [phone]  Fax: [phone]              Milton Stewart  1965    Encounter Date: 6/1/2017    Gomez Tesfaye M.D.          PROGRESS NOTE:  Subjective:   Milton Stewart is a 51 y.o. male who presents today as a follow-up for substance induced cardiomyopathy. He completed a 6 minute walk test today and did extraordinarily well. He now has no functional limitations. His blood pressure remains in the 120s to 140s. He is compliant with his medications. He reports that he is occasionally drinking but nothing like before. He just came back from a four-week stent in [place] where he says he did not do any illicit substances. Of note he also says that he has been told before that he has high iron and high hematocrit. He's never been tested for hemochromatosis. His last iron saturation of system was 16%.    Past Medical History   Diagnosis Date   • ASTHMA    • ETOH abuse      Past Surgical History   Procedure Laterality Date   • Monse by laparoscopy  2005   • Tonsillectomy  1974     Family History   Problem Relation Age of Onset   • Heart Disease Mother    • Diabetes Mother    • Kidney Disease Mother      on dialysis   • Heart Disease Father    • Other Father      mechanical injuries   • Stroke Brother    • Heart Disease Brother      History   Smoking status   • Never Smoker    Smokeless tobacco   • Not on file     No Known Allergies  Outpatient Encounter Prescriptions as of 6/1/2017   Medication Sig Dispense Refill   • lisinopril (PRINIVIL, ZESTRIL) 40 MG tablet Take 1 Tab by mouth every day. 30 Tab 11   • carvedilol (COREG) 3.125 MG Tab Take 1 Tab by mouth 2 times a day, with meals. 180 Tab 3   • aspirin 81 MG EC tablet Take 1 Tab by mouth every day. 100 Tab 0   • fluticasone (FLOVENT HFA) 110 MCG/ACT Aerosol Inhale 2 Puffs by mouth 2 times a day.     • ALBUTEROL INH Inhale  by  "mouth.     • nitroglycerin (NITROSTAT) 0.4 MG SL Tab Place 1 Tab under tongue as needed for Chest Pain (Up to 3 doses (if SBP greater than 90 mmHg)). 25 Tab 1   • esomeprazole (NEXIUM) 20 MG capsule Take 20 mg by mouth as needed (For heartburn).     • acetaminophen (TYLENOL) 500 MG Tab Take 1,500 mg by mouth every 6 hours as needed for Moderate Pain.     • alprazolam (XANAX) 1 MG Tab Take 0.5-1 mg by mouth at bedtime as needed for Anxiety.       No facility-administered encounter medications on file as of 6/1/2017.     Review of Systems   Constitutional: Negative.  Negative for fever, chills and malaise/fatigue.   HENT: Negative.  Negative for sore throat.    Eyes: Negative.    Respiratory: Negative.  Negative for cough, hemoptysis, sputum production, shortness of breath, wheezing and stridor.    Cardiovascular: Negative.  Negative for chest pain, palpitations, orthopnea, claudication, leg swelling and PND.   Gastrointestinal: Negative.    Genitourinary: Negative.    Musculoskeletal: Negative.    Skin: Negative.    Neurological: Negative.  Negative for dizziness, loss of consciousness and weakness.   Endo/Heme/Allergies: Negative.  Does not bruise/bleed easily.   All other systems reviewed and are negative.       Objective:   /90 mmHg  Pulse 78  Ht 1.905 m (6' 3\")  Wt 128.822 kg (284 lb)  BMI 35.50 kg/m2  SpO2 95%    Physical Exam   Constitutional: He is oriented to person, place, and time. He appears well-developed and well-nourished. No distress.   HENT:   Head: Normocephalic.   Mouth/Throat: Oropharynx is clear and moist.   Eyes: EOM are normal. Pupils are equal, round, and reactive to light. Right eye exhibits no discharge. Left eye exhibits no discharge. No scleral icterus.   Neck: Normal range of motion. Neck supple. No JVD present. No tracheal deviation present.   Cardiovascular: Normal rate, regular rhythm, S1 normal, S2 normal, normal heart sounds, intact distal pulses and normal pulses.  Exam " reveals no gallop, no S3, no S4 and no friction rub.    No murmur heard.   No systolic murmur is present    No diastolic murmur is present   Pulses:       Carotid pulses are 2+ on the right side, and 2+ on the left side.       Radial pulses are 2+ on the right side, and 2+ on the left side.        Dorsalis pedis pulses are 2+ on the right side, and 2+ on the left side.        Posterior tibial pulses are 2+ on the right side, and 2+ on the left side.   Pulmonary/Chest: Effort normal and breath sounds normal. No respiratory distress. He has no wheezes. He has no rales.   Abdominal: Soft. Bowel sounds are normal. He exhibits no distension and no mass. There is no tenderness. There is no rebound and no guarding.   Musculoskeletal: He exhibits no edema.   Neurological: He is alert and oriented to person, place, and time. No cranial nerve deficit.   Skin: Skin is warm and dry. He is not diaphoretic. No pallor.   Psychiatric: He has a normal mood and affect. His behavior is normal. Judgment and thought content normal.   Nursing note and vitals reviewed.      Assessment:     1. Alcohol use  ECHOCARDIOGRAM COMP W/O CONT    IRON/TOTAL IRON BIND (FEIBC)    CBC W/ DIFF W/O PLATELETS    HEREDITARY HEMOCHROMOTOSIS   2. Stable angina pectoris (CMS-HCC)  ECHOCARDIOGRAM COMP W/O CONT    IRON/TOTAL IRON BIND (FEIBC)    CBC W/ DIFF W/O PLATELETS    HEREDITARY HEMOCHROMOTOSIS   3. Left ventricular systolic dysfunction  ECHOCARDIOGRAM COMP W/O CONT    IRON/TOTAL IRON BIND (FEIBC)    CBC W/ DIFF W/O PLATELETS    HEREDITARY HEMOCHROMOTOSIS   4. Hypokalemia  IRON/TOTAL IRON BIND (FEIBC)    CBC W/ DIFF W/O PLATELETS   5. Essential hypertension     6. Cocaine use         Medical Decision Making:  Today's Assessment / Status / Plan:     51-year-old male with heart failure with reduced ejection fraction now with symptoms consistent with a normalized EF. We will recheck an echocardiogram today. Additionally I will go ahead and send basic labs  to see if he has any hemochromatosis. Back in 3 months we will call with results of echocardiogram.    1. CHFrEF 45%, NYHA 1, Stage C, Hemo Pro A    - cont coreg 3.125 BID    - cont lisinopril 40     - Hydral/Imdur - not indicated    - ICD - not indicated    - BiV - not indicated    - Cardiac PET normal    - Iron overload -see below    2. ETOH    - encourage sobriety    3. Cocaine      - encourage sobriety    4. Iron overload    - CBC, Fe/Ferritin    - Hemochromatosis test today    Thank for you allowing me to take part in your patient's care, please call should you have any questions or would like to discuss this patient.      Tam Rg M.D.  [address] [address]  VIA Facsimile: [phone]

## 2017-06-01 NOTE — PROGRESS NOTES
Subjective:   Milton Stewart is a 51 y.o. male who presents today as a follow-up for substance induced cardiomyopathy. He completed a 6 minute walk test today and did extraordinarily well. He now has no functional limitations. His blood pressure remains in the 120s to 140s. He is compliant with his medications. He reports that he is occasionally drinking but nothing like before. He just came back from a four-week stent in [place] where he says he did not do any illicit substances. Of note he also says that he has been told before that he has high iron and high hematocrit. He's never been tested for hemochromatosis. His last iron saturation of system was 16%.    Past Medical History   Diagnosis Date   • ASTHMA    • ETOH abuse      Past Surgical History   Procedure Laterality Date   • Monse by laparoscopy  2005   • Tonsillectomy  1974     Family History   Problem Relation Age of Onset   • Heart Disease Mother    • Diabetes Mother    • Kidney Disease Mother      on dialysis   • Heart Disease Father    • Other Father      mechanical injuries   • Stroke Brother    • Heart Disease Brother      History   Smoking status   • Never Smoker    Smokeless tobacco   • Not on file     No Known Allergies  Outpatient Encounter Prescriptions as of 6/1/2017   Medication Sig Dispense Refill   • lisinopril (PRINIVIL, ZESTRIL) 40 MG tablet Take 1 Tab by mouth every day. 30 Tab 11   • carvedilol (COREG) 3.125 MG Tab Take 1 Tab by mouth 2 times a day, with meals. 180 Tab 3   • aspirin 81 MG EC tablet Take 1 Tab by mouth every day. 100 Tab 0   • fluticasone (FLOVENT HFA) 110 MCG/ACT Aerosol Inhale 2 Puffs by mouth 2 times a day.     • ALBUTEROL INH Inhale  by mouth.     • nitroglycerin (NITROSTAT) 0.4 MG SL Tab Place 1 Tab under tongue as needed for Chest Pain (Up to 3 doses (if SBP greater than 90 mmHg)). 25 Tab 1   • esomeprazole (NEXIUM) 20 MG capsule Take 20 mg by mouth as needed (For heartburn).     • acetaminophen (TYLENOL)  "500 MG Tab Take 1,500 mg by mouth every 6 hours as needed for Moderate Pain.     • alprazolam (XANAX) 1 MG Tab Take 0.5-1 mg by mouth at bedtime as needed for Anxiety.       No facility-administered encounter medications on file as of 6/1/2017.     Review of Systems   Constitutional: Negative.  Negative for fever, chills and malaise/fatigue.   HENT: Negative.  Negative for sore throat.    Eyes: Negative.    Respiratory: Negative.  Negative for cough, hemoptysis, sputum production, shortness of breath, wheezing and stridor.    Cardiovascular: Negative.  Negative for chest pain, palpitations, orthopnea, claudication, leg swelling and PND.   Gastrointestinal: Negative.    Genitourinary: Negative.    Musculoskeletal: Negative.    Skin: Negative.    Neurological: Negative.  Negative for dizziness, loss of consciousness and weakness.   Endo/Heme/Allergies: Negative.  Does not bruise/bleed easily.   All other systems reviewed and are negative.       Objective:   /90 mmHg  Pulse 78  Ht 1.905 m (6' 3\")  Wt 128.822 kg (284 lb)  BMI 35.50 kg/m2  SpO2 95%    Physical Exam   Constitutional: He is oriented to person, place, and time. He appears well-developed and well-nourished. No distress.   HENT:   Head: Normocephalic.   Mouth/Throat: Oropharynx is clear and moist.   Eyes: EOM are normal. Pupils are equal, round, and reactive to light. Right eye exhibits no discharge. Left eye exhibits no discharge. No scleral icterus.   Neck: Normal range of motion. Neck supple. No JVD present. No tracheal deviation present.   Cardiovascular: Normal rate, regular rhythm, S1 normal, S2 normal, normal heart sounds, intact distal pulses and normal pulses.  Exam reveals no gallop, no S3, no S4 and no friction rub.    No murmur heard.   No systolic murmur is present    No diastolic murmur is present   Pulses:       Carotid pulses are 2+ on the right side, and 2+ on the left side.       Radial pulses are 2+ on the right side, and 2+ on " the left side.        Dorsalis pedis pulses are 2+ on the right side, and 2+ on the left side.        Posterior tibial pulses are 2+ on the right side, and 2+ on the left side.   Pulmonary/Chest: Effort normal and breath sounds normal. No respiratory distress. He has no wheezes. He has no rales.   Abdominal: Soft. Bowel sounds are normal. He exhibits no distension and no mass. There is no tenderness. There is no rebound and no guarding.   Musculoskeletal: He exhibits no edema.   Neurological: He is alert and oriented to person, place, and time. No cranial nerve deficit.   Skin: Skin is warm and dry. He is not diaphoretic. No pallor.   Psychiatric: He has a normal mood and affect. His behavior is normal. Judgment and thought content normal.   Nursing note and vitals reviewed.      Assessment:     1. Alcohol use  ECHOCARDIOGRAM COMP W/O CONT    IRON/TOTAL IRON BIND (FEIBC)    CBC W/ DIFF W/O PLATELETS    HEREDITARY HEMOCHROMOTOSIS   2. Stable angina pectoris (CMS-HCC)  ECHOCARDIOGRAM COMP W/O CONT    IRON/TOTAL IRON BIND (FEIBC)    CBC W/ DIFF W/O PLATELETS    HEREDITARY HEMOCHROMOTOSIS   3. Left ventricular systolic dysfunction  ECHOCARDIOGRAM COMP W/O CONT    IRON/TOTAL IRON BIND (FEIBC)    CBC W/ DIFF W/O PLATELETS    HEREDITARY HEMOCHROMOTOSIS   4. Hypokalemia  IRON/TOTAL IRON BIND (FEIBC)    CBC W/ DIFF W/O PLATELETS   5. Essential hypertension     6. Cocaine use         Medical Decision Making:  Today's Assessment / Status / Plan:     51-year-old male with heart failure with reduced ejection fraction now with symptoms consistent with a normalized EF. We will recheck an echocardiogram today. Additionally I will go ahead and send basic labs to see if he has any hemochromatosis. Back in 3 months we will call with results of echocardiogram.    1. CHFrEF 45%, NYHA 1, Stage C, Hemo Pro A    - cont coreg 3.125 BID    - cont lisinopril 40     - Hydral/Imdur - not indicated    - ICD - not indicated    - BiV - not  indicated    - Cardiac PET normal    - Iron overload -see below    2. ETOH    - encourage sobriety    3. Cocaine      - encourage sobriety    4. Iron overload    - CBC, Fe/Ferritin    - Hemochromatosis test today    Thank for you allowing me to take part in your patient's care, please call should you have any questions or would like to discuss this patient.

## 2017-06-01 NOTE — MR AVS SNAPSHOT
"        Milton Franco Jovisidneymacher   2017 8:30 AM   Office Visit   MRN: [id number]    Department:  [facility]   Dept Phone:  [phone]    Description:  Male : 1965   Provider:  Gomez Tesfaye M.D.           Reason for Visit     Follow-Up HF est       Allergies as of 2017     No Known Allergies      You were diagnosed with     Alcohol use   [217117]       Stable angina pectoris (CMS-HCC)   [2588957]       Left ventricular systolic dysfunction   [804505]       Hypokalemia   [155115]       Essential hypertension   [2960210]       Cocaine use   [130709]         Vital Signs     Blood Pressure Pulse Height Weight Body Mass Index Oxygen Saturation    146/90 mmHg 78 1.905 m (6' 3\") 128.822 kg (284 lb) 35.50 kg/m2 95%    Smoking Status                   Never Smoker            Basic Information     Date Of Birth Sex Race Ethnicity Preferred Language    1965 Male White Non- English      Problem List              ICD-10-CM Priority Class Noted - Resolved    HTN (hypertension) I10   2016 - Present    Alcohol use Z78.9   2016 - Present    Cocaine use F14.10   2016 - Present    Chest pain R07.9   2016 - Present    Hypokalemia E87.6   2016 - Present    Left ventricular systolic dysfunction I51.9   2016 - Present      Health Maintenance        Date Due Completion Dates    IMM DTaP/Tdap/Td Vaccine (1 - Tdap) 1983 ---    COLONOSCOPY 2014 ---            Current Immunizations     No immunizations on file.      Below and/or attached are the medications your provider expects you to take. Review all of your home medications and newly ordered medications with your provider and/or pharmacist. Follow medication instructions as directed by your provider and/or pharmacist. Please keep your medication list with you and share with your provider. Update the information when medications are discontinued, doses are changed, or new medications (including " over-the-counter products) are added; and carry medication information at all times in the event of emergency situations     Allergies:  No Known Allergies          Medications  Valid as of: June 01, 2017 -  9:04 AM    Generic Name Brand Name Tablet Size Instructions for use    Acetaminophen (Tab) TYLENOL 500 MG Take 1,500 mg by mouth every 6 hours as needed for Moderate Pain.        Albuterol   Inhale  by mouth.        ALPRAZolam (Tab) XANAX 1 MG Take 0.5-1 mg by mouth at bedtime as needed for Anxiety.        Aspirin (Tablet Delayed Response) aspirin 81 MG Take 1 Tab by mouth every day.        Carvedilol (Tab) COREG 3.125 MG Take 1 Tab by mouth 2 times a day, with meals.        Esomeprazole Magnesium (CAPSULE DELAYED RELEASE) NEXIUM 20 MG Take 20 mg by mouth as needed (For heartburn).        Fluticasone Propionate HFA (Aerosol) FLOVENT  MCG/ACT Inhale 2 Puffs by mouth 2 times a day.        Lisinopril (Tab) PRINIVIL, ZESTRIL 40 MG Take 1 Tab by mouth every day.        Nitroglycerin (SL Tab) NITROSTAT 0.4 MG Place 1 Tab under tongue as needed for Chest Pain (Up to 3 doses (if SBP greater than 90 mmHg)).        .                 Medicines prescribed today were sent to:     [id number] - [place], [place] - [address] [address]    Phone: [phone] Fax: [phone]    Open 24 Hours?: No      Medication refill instructions:       If your prescription bottle indicates you have medication refills left, it is not necessary to call your provider’s office. Please contact your pharmacy and they will refill your medication.    If your prescription bottle indicates you do not have any refills left, you may request refills at any time through one of the following ways: The online MakeSpace system (except Urgent Care), by calling your provider’s office, or by asking your pharmacy to contact your provider’s office with a refill request. Medication refills are processed only during  regular business hours and may not be available until the next business day. Your provider may request additional information or to have a follow-up visit with you prior to refilling your medication.   *Please Note: Medication refills are assigned a new Rx number when refilled electronically. Your pharmacy may indicate that no refills were authorized even though a new prescription for the same medication is available at the pharmacy. Please request the medicine by name with the pharmacy before contacting your provider for a refill.        Your To Do List     Future Labs/Procedures Complete By Expires    CBC W/ DIFF W/O PLATELETS  As directed 6/1/2018    ECHOCARDIOGRAM COMP W/O CONT  As directed 6/2/2018    IRON/TOTAL IRON BIND (FEIBC)  As directed 6/1/2018         Superplayer Access Code: [email or web address]  Expires: 6/24/2017  4:05 AM    Superplayer  A secure, online tool to manage your health information     "[employer]’s Superplayer® is a secure, online tool that connects you to your personalized health information from the privacy of your home -- day or night - making it very easy for you to manage your healthcare. Once the activation process is completed, you can even access your medical information using the Superplayer zoey, which is available for free in the Apple Zoey store or Google Play store.     Superplayer provides the following levels of access (as shown below):   My Chart Features   [facility] Doctor [facility]  Urgent  Care Non-[facility]  Primary Care  Doctor   Email your healthcare team securely and privately 24/7 X X X    Manage appointments: schedule your next appointment; view details of past/upcoming appointments X      Request prescription refills. X      View recent personal medical records, including lab and immunizations X X X X   View health record, including health history, allergies, medications X X X X   Read reports about your outpatient visits, procedures, consult and ER notes X X X  X   See your discharge summary, which is a recap of your hospital and/or ER visit that includes your diagnosis, lab results, and care plan. X X       How to register for "[employer]:  1. Go to  [email or web address].  2. Click on the Sign Up Now box, which takes you to the New Member Sign Up page. You will need to provide the following information:  a. Enter your "[employer] Access Code exactly as it appears at the top of this page. (You will not need to use this code after you’ve completed the sign-up process. If you do not sign up before the expiration date, you must request a new code.)   b. Enter your date of birth.   c. Enter your home email address.   d. Click Submit, and follow the next screen’s instructions.  3. Create a i.TVt ID. This will be your i.TVt login ID and cannot be changed, so think of one that is secure and easy to remember.  4. Create a i.TVt password. You can change your password at any time.  5. Enter your Password Reset Question and Answer. This can be used at a later time if you forget your password.   6. Enter your e-mail address. This allows you to receive e-mail notifications when new information is available in "[employer].  7. Click Sign Up. You can now view your health information.    For assistance activating your "[employer] account, call [phone]

## 2017-06-05 LAB
HFE GENE MUT ANL BLD/T: NORMAL
HFE P.C282Y BLD/T QL: NEGATIVE
HFE P.H63D BLD/T QL: NORMAL
HFE P.S65C BLD/T QL: NEGATIVE

## 2017-07-13 ENCOUNTER — HOSPITAL ENCOUNTER (OUTPATIENT)
Dept: LAB | Facility: MEDICAL CENTER | Age: 52
End: 2017-07-13
Attending: FAMILY MEDICINE
Payer: COMMERCIAL

## 2017-07-13 LAB
ALBUMIN SERPL BCP-MCNC: 4 G/DL (ref 3.2–4.9)
ALBUMIN/GLOB SERPL: 1.3 G/DL
ALP SERPL-CCNC: 41 U/L (ref 30–99)
ALT SERPL-CCNC: 20 U/L (ref 2–50)
ANION GAP SERPL CALC-SCNC: 8 MMOL/L (ref 0–11.9)
AST SERPL-CCNC: 19 U/L (ref 12–45)
BILIRUB SERPL-MCNC: 0.9 MG/DL (ref 0.1–1.5)
BUN SERPL-MCNC: 19 MG/DL (ref 8–22)
CALCIUM SERPL-MCNC: 9.2 MG/DL (ref 8.5–10.5)
CHLORIDE SERPL-SCNC: 106 MMOL/L (ref 96–112)
CHOLEST SERPL-MCNC: 162 MG/DL (ref 100–199)
CO2 SERPL-SCNC: 25 MMOL/L (ref 20–33)
CREAT SERPL-MCNC: 0.94 MG/DL (ref 0.5–1.4)
GFR SERPL CREATININE-BSD FRML MDRD: >60 ML/MIN/1.73 M 2
GLOBULIN SER CALC-MCNC: 3.1 G/DL (ref 1.9–3.5)
GLUCOSE SERPL-MCNC: 99 MG/DL (ref 65–99)
HDLC SERPL-MCNC: 52 MG/DL
LDLC SERPL CALC-MCNC: 99 MG/DL
POTASSIUM SERPL-SCNC: 4.1 MMOL/L (ref 3.6–5.5)
PROT SERPL-MCNC: 7.1 G/DL (ref 6–8.2)
PSA SERPL-MCNC: 0.46 NG/ML (ref 0–4)
SODIUM SERPL-SCNC: 139 MMOL/L (ref 135–145)
TRIGL SERPL-MCNC: 55 MG/DL (ref 0–149)
TSH SERPL DL<=0.005 MIU/L-ACNC: 1.66 UIU/ML (ref 0.3–3.7)

## 2017-07-13 PROCEDURE — 80061 LIPID PANEL: CPT

## 2017-07-13 PROCEDURE — 36415 COLL VENOUS BLD VENIPUNCTURE: CPT

## 2017-07-13 PROCEDURE — 84443 ASSAY THYROID STIM HORMONE: CPT

## 2017-07-13 PROCEDURE — 84153 ASSAY OF PSA TOTAL: CPT

## 2017-07-13 PROCEDURE — 80053 COMPREHEN METABOLIC PANEL: CPT

## 2017-07-24 ENCOUNTER — HOSPITAL ENCOUNTER (OUTPATIENT)
Dept: CARDIOLOGY | Facility: MEDICAL CENTER | Age: 52
End: 2017-07-24
Attending: INTERNAL MEDICINE
Payer: COMMERCIAL

## 2017-07-24 DIAGNOSIS — I51.9 LEFT VENTRICULAR SYSTOLIC DYSFUNCTION: ICD-10-CM

## 2017-07-24 DIAGNOSIS — I20.89 STABLE ANGINA PECTORIS: ICD-10-CM

## 2017-07-24 DIAGNOSIS — Z78.9 ALCOHOL USE: ICD-10-CM

## 2017-07-24 LAB
LV EJECT FRACT  99904: 55
LV EJECT FRACT MOD 2C 99903: 55.68
LV EJECT FRACT MOD 4C 99902: 59.22
LV EJECT FRACT MOD BP 99901: 57.33

## 2017-07-24 PROCEDURE — 93306 TTE W/DOPPLER COMPLETE: CPT

## 2017-07-24 PROCEDURE — 93306 TTE W/DOPPLER COMPLETE: CPT | Mod: 26 | Performed by: INTERNAL MEDICINE

## 2017-07-25 NOTE — PROGRESS NOTES
"Reevaluation of 6MWT/MLWHF Questionnaire    Date: 2016  6MWT: 469 meters  MLWHF: 2    Date: 2017  6MWT: 535 meters  MLWHF: 2    OP Heart Failure  Vitals  Appointment Type: Heart Failure Established  Weight: (!) 128.822 kg (284 lb)  How Weight Obtained: Stand Up Scale  Height: 190.5 cm (6' 3\")  BMI (Calculated): 35.5  Blood Pressure: 146/90 mmHg  Pulse: 78    System Assessment  NYHA Functional Class Assessment: Class l  ACC/AHA HF Stage: C    MN Living with Heart Failure  Swelling in Ankles or Legs: 0  Having to Sit or Lie Down During the Day: 0  Walking About or Climbing Stairs Difficult: 0  Working Around the House or Yard Difficult: 0  Difficulty Going Away from Home: 0  Difficulty Sleeping Well at Night: 0  Difficulty Socializing with Family or Friends: 0  Difficulty Working to Earn a Livin  Difficulty with Recreational Pastimes, Sports, Hobbies: 0  Difficulty with Sexual Activities: 0  Eating Less Foods You Like: 0  Making you Short of Breath: 0  Tired, Fatigued or Low on Energy: 0  Making you Stay in a Hospital: 0  Costing you Money for Medical Care?: 0  Giving you Side Effects from Treatments: 0  Feeling like a Branch to Family and Friends: 0  Loss of Self Control in your Life: 0  Making You Worry: 2  Difficulty to Concentrate or Remembering Things: 0  Making you Feel Depressed: 0  MLHF Total Score : 2    6 Minute Walk Test  Baseline to end of test: 6:00  Total meters walked: 535       BULMARO Perea RN  [phone]  "

## 2017-08-04 ENCOUNTER — TELEPHONE (OUTPATIENT)
Dept: CARDIOLOGY | Facility: MEDICAL CENTER | Age: 52
End: 2017-08-04

## 2017-08-04 NOTE — TELEPHONE ENCOUNTER
Message  Received: Yesterday       Gomez Tesfaye M.D.  Lillie Mann R.N.                     Let patient know echo looks good              Letter sent.

## 2017-08-04 NOTE — Clinical Note
August 4, 2017        Milton Stewart  [address] [place] [address]          Dear Milton,    We have received the results of your recent Echocardiogram on 7/24.    Your test came back unchanged or within normal limits.      Please follow up as previously discussed with your physician.      Appt with Dr. Tesfaye 8/30/2017 at 8:45 am    Feel free to call us with any questions.        Sincerely,        [facility]  Electronically Signed

## 2017-09-20 ENCOUNTER — OFFICE VISIT (OUTPATIENT)
Dept: CARDIOLOGY | Facility: MEDICAL CENTER | Age: 52
End: 2017-09-20
Payer: COMMERCIAL

## 2017-09-20 VITALS
SYSTOLIC BLOOD PRESSURE: 142 MMHG | HEIGHT: 75 IN | WEIGHT: 282 LBS | BODY MASS INDEX: 35.06 KG/M2 | DIASTOLIC BLOOD PRESSURE: 100 MMHG | HEART RATE: 76 BPM | OXYGEN SATURATION: 94 %

## 2017-09-20 DIAGNOSIS — R06.02 SOB (SHORTNESS OF BREATH): ICD-10-CM

## 2017-09-20 DIAGNOSIS — I10 ESSENTIAL HYPERTENSION: ICD-10-CM

## 2017-09-20 DIAGNOSIS — I20.89 STABLE ANGINA PECTORIS: ICD-10-CM

## 2017-09-20 DIAGNOSIS — E87.6 HYPOKALEMIA: ICD-10-CM

## 2017-09-20 DIAGNOSIS — I51.9 LEFT VENTRICULAR SYSTOLIC DYSFUNCTION: ICD-10-CM

## 2017-09-20 DIAGNOSIS — F14.90 COCAINE USE: ICD-10-CM

## 2017-09-20 PROCEDURE — 99214 OFFICE O/P EST MOD 30 MIN: CPT | Performed by: INTERNAL MEDICINE

## 2017-09-20 ASSESSMENT — ENCOUNTER SYMPTOMS
DIZZINESS: 0
RESPIRATORY NEGATIVE: 1
CLAUDICATION: 0
PND: 0
MUSCULOSKELETAL NEGATIVE: 1
EYES NEGATIVE: 1
BRUISES/BLEEDS EASILY: 0
WHEEZING: 0
CHILLS: 0
PALPITATIONS: 0
SORE THROAT: 0
LOSS OF CONSCIOUSNESS: 0
CONSTITUTIONAL NEGATIVE: 1
FEVER: 0
SHORTNESS OF BREATH: 0
NEUROLOGICAL NEGATIVE: 1
GASTROINTESTINAL NEGATIVE: 1
WEAKNESS: 0
COUGH: 0
STRIDOR: 0
SPUTUM PRODUCTION: 0
HEMOPTYSIS: 0
CARDIOVASCULAR NEGATIVE: 1
ORTHOPNEA: 0

## 2017-09-20 NOTE — PROGRESS NOTES
Subjective:   Milton Stewart is a 51 y.o. male who presents today as a follow-up for his substance induced heart failure. Since he was last seen he is doing well. His EF is normalized. His iron sat was 42%. He did send him for the genetic test for hemachromatosis which showed a heterozygote pattern. He is trying to cut back on drinking. His blood pressures otherwise controlled and he is feeling very good.    Past Medical History:   Diagnosis Date   • ASTHMA    • ETOH abuse      Past Surgical History:   Procedure Laterality Date   • BHARATHI BY LAPAROSCOPY  2005   • TONSILLECTOMY  1974     Family History   Problem Relation Age of Onset   • Heart Disease Mother    • Diabetes Mother    • Kidney Disease Mother      on dialysis   • Heart Disease Father    • Other Father      mechanical injuries   • Stroke Brother    • Heart Disease Brother      History   Smoking Status   • Never Smoker   Smokeless Tobacco   • Never Used     No Known Allergies  Outpatient Encounter Prescriptions as of 9/20/2017   Medication Sig Dispense Refill   • lisinopril (PRINIVIL, ZESTRIL) 40 MG tablet Take 1 Tab by mouth every day. 30 Tab 11   • carvedilol (COREG) 3.125 MG Tab Take 1 Tab by mouth 2 times a day, with meals. 180 Tab 3   • aspirin 81 MG EC tablet Take 1 Tab by mouth every day. 100 Tab 0   • ALBUTEROL INH Inhale  by mouth.     • nitroglycerin (NITROSTAT) 0.4 MG SL Tab Place 1 Tab under tongue as needed for Chest Pain (Up to 3 doses (if SBP greater than 90 mmHg)). 25 Tab 1   • fluticasone (FLOVENT HFA) 110 MCG/ACT Aerosol Inhale 2 Puffs by mouth 2 times a day.     • esomeprazole (NEXIUM) 20 MG capsule Take 20 mg by mouth as needed (For heartburn).     • acetaminophen (TYLENOL) 500 MG Tab Take 1,500 mg by mouth every 6 hours as needed for Moderate Pain.     • alprazolam (XANAX) 1 MG Tab Take 0.5-1 mg by mouth at bedtime as needed for Anxiety.       No facility-administered encounter medications on file as of 9/20/2017.   "    Review of Systems   Constitutional: Negative.  Negative for chills, fever and malaise/fatigue.   HENT: Negative.  Negative for sore throat.    Eyes: Negative.    Respiratory: Negative.  Negative for cough, hemoptysis, sputum production, shortness of breath, wheezing and stridor.    Cardiovascular: Negative.  Negative for chest pain, palpitations, orthopnea, claudication, leg swelling and PND.   Gastrointestinal: Negative.    Genitourinary: Negative.    Musculoskeletal: Negative.    Skin: Negative.    Neurological: Negative.  Negative for dizziness, loss of consciousness and weakness.   Endo/Heme/Allergies: Negative.  Does not bruise/bleed easily.   All other systems reviewed and are negative.       Objective:   /100   Pulse 76   Ht 1.905 m (6' 3\")   Wt (!) 127.9 kg (282 lb)   SpO2 94%   BMI 35.25 kg/m²     Physical Exam   Constitutional: He is oriented to person, place, and time. He appears well-developed and well-nourished. No distress.   HENT:   Head: Normocephalic.   Mouth/Throat: Oropharynx is clear and moist.   Eyes: EOM are normal. Pupils are equal, round, and reactive to light. Right eye exhibits no discharge. Left eye exhibits no discharge. No scleral icterus.   Neck: Normal range of motion. Neck supple. No JVD present. No tracheal deviation present.   Cardiovascular: Normal rate, regular rhythm, S1 normal, S2 normal, normal heart sounds, intact distal pulses and normal pulses.  Exam reveals no gallop, no S3, no S4 and no friction rub.    No murmur heard.   No systolic murmur is present    No diastolic murmur is present   Pulses:       Carotid pulses are 2+ on the right side, and 2+ on the left side.       Radial pulses are 2+ on the right side, and 2+ on the left side.        Dorsalis pedis pulses are 2+ on the right side, and 2+ on the left side.        Posterior tibial pulses are 2+ on the right side, and 2+ on the left side.   Pulmonary/Chest: Effort normal and breath sounds normal. No " respiratory distress. He has no wheezes. He has no rales.   Abdominal: Soft. Bowel sounds are normal. He exhibits no distension and no mass. There is no tenderness. There is no rebound and no guarding.   Musculoskeletal: He exhibits no edema.   Neurological: He is alert and oriented to person, place, and time. No cranial nerve deficit.   Skin: Skin is warm and dry. He is not diaphoretic. No pallor.   Psychiatric: He has a normal mood and affect. His behavior is normal. Judgment and thought content normal.   Nursing note and vitals reviewed.      Assessment:     1. Stable angina pectoris (CMS-HCC)     2. Cocaine use     3. Essential hypertension     4. SOB (shortness of breath)     5. Left ventricular systolic dysfunction     6. Hypokalemia         Medical Decision Making:  Today's Assessment / Status / Plan:       51-year-old male with heart failure with reduced ejection fraction now normalized. He is doing well. No changes to his medications we will keep him on the same dose of everything he is taking. We will see him back in 3 months.     1. CHFrEF 45%, NYHA 1, Stage C, Hemo Pro A    - cont coreg 3.125 BID    - cont lisinopril 40     - Hydral/Imdur - not indicated    - ICD - not indicated    - BiV - not indicated    - Cardiac PET normal    - Iron overload -see below     2. ETOH    - encourage sobriety     3. Cocaine      - encourage sobriety     4. Iron overload    - heterozygous pattern    Thank for you allowing me to take part in your patient's care, please call should you have any questions or would like to discuss this patient.

## 2017-09-20 NOTE — LETTER
[facility]   [address]  [place] [address]  Phone: [phone]  Fax: [phone]              Milton Stewart  1965    Encounter Date: 9/20/2017    Gomez Tesfaye M.D.          PROGRESS NOTE:  Subjective:   Milton Stewart is a 51 y.o. male who presents today as a follow-up for his substance induced heart failure. Since he was last seen he is doing well. His EF is normalized. His iron sat was 42%. He did send him for the genetic test for hemachromatosis which showed a heterozygote pattern. He is trying to cut back on drinking. His blood pressures otherwise controlled and he is feeling very good.    Past Medical History:   Diagnosis Date   • ASTHMA    • ETOH abuse      Past Surgical History:   Procedure Laterality Date   • BHARATHI BY LAPAROSCOPY  2005   • TONSILLECTOMY  1974     Family History   Problem Relation Age of Onset   • Heart Disease Mother    • Diabetes Mother    • Kidney Disease Mother      on dialysis   • Heart Disease Father    • Other Father      mechanical injuries   • Stroke Brother    • Heart Disease Brother      History   Smoking Status   • Never Smoker   Smokeless Tobacco   • Never Used     No Known Allergies  Outpatient Encounter Prescriptions as of 9/20/2017   Medication Sig Dispense Refill   • lisinopril (PRINIVIL, ZESTRIL) 40 MG tablet Take 1 Tab by mouth every day. 30 Tab 11   • carvedilol (COREG) 3.125 MG Tab Take 1 Tab by mouth 2 times a day, with meals. 180 Tab 3   • aspirin 81 MG EC tablet Take 1 Tab by mouth every day. 100 Tab 0   • ALBUTEROL INH Inhale  by mouth.     • nitroglycerin (NITROSTAT) 0.4 MG SL Tab Place 1 Tab under tongue as needed for Chest Pain (Up to 3 doses (if SBP greater than 90 mmHg)). 25 Tab 1   • fluticasone (FLOVENT HFA) 110 MCG/ACT Aerosol Inhale 2 Puffs by mouth 2 times a day.     • esomeprazole (NEXIUM) 20 MG capsule Take 20 mg by mouth as needed (For heartburn).     •  "acetaminophen (TYLENOL) 500 MG Tab Take 1,500 mg by mouth every 6 hours as needed for Moderate Pain.     • alprazolam (XANAX) 1 MG Tab Take 0.5-1 mg by mouth at bedtime as needed for Anxiety.       No facility-administered encounter medications on file as of 9/20/2017.      Review of Systems   Constitutional: Negative.  Negative for chills, fever and malaise/fatigue.   HENT: Negative.  Negative for sore throat.    Eyes: Negative.    Respiratory: Negative.  Negative for cough, hemoptysis, sputum production, shortness of breath, wheezing and stridor.    Cardiovascular: Negative.  Negative for chest pain, palpitations, orthopnea, claudication, leg swelling and PND.   Gastrointestinal: Negative.    Genitourinary: Negative.    Musculoskeletal: Negative.    Skin: Negative.    Neurological: Negative.  Negative for dizziness, loss of consciousness and weakness.   Endo/Heme/Allergies: Negative.  Does not bruise/bleed easily.   All other systems reviewed and are negative.       Objective:   /100   Pulse 76   Ht 1.905 m (6' 3\")   Wt (!) 127.9 kg (282 lb)   SpO2 94%   BMI 35.25 kg/m²      Physical Exam   Constitutional: He is oriented to person, place, and time. He appears well-developed and well-nourished. No distress.   HENT:   Head: Normocephalic.   Mouth/Throat: Oropharynx is clear and moist.   Eyes: EOM are normal. Pupils are equal, round, and reactive to light. Right eye exhibits no discharge. Left eye exhibits no discharge. No scleral icterus.   Neck: Normal range of motion. Neck supple. No JVD present. No tracheal deviation present.   Cardiovascular: Normal rate, regular rhythm, S1 normal, S2 normal, normal heart sounds, intact distal pulses and normal pulses.  Exam reveals no gallop, no S3, no S4 and no friction rub.    No murmur heard.   No systolic murmur is present    No diastolic murmur is present   Pulses:       Carotid pulses are 2+ on the right side, and 2+ on the left side.       Radial pulses are 2+ " on the right side, and 2+ on the left side.        Dorsalis pedis pulses are 2+ on the right side, and 2+ on the left side.        Posterior tibial pulses are 2+ on the right side, and 2+ on the left side.   Pulmonary/Chest: Effort normal and breath sounds normal. No respiratory distress. He has no wheezes. He has no rales.   Abdominal: Soft. Bowel sounds are normal. He exhibits no distension and no mass. There is no tenderness. There is no rebound and no guarding.   Musculoskeletal: He exhibits no edema.   Neurological: He is alert and oriented to person, place, and time. No cranial nerve deficit.   Skin: Skin is warm and dry. He is not diaphoretic. No pallor.   Psychiatric: He has a normal mood and affect. His behavior is normal. Judgment and thought content normal.   Nursing note and vitals reviewed.      Assessment:     1. Stable angina pectoris (CMS-HCC)     2. Cocaine use     3. Essential hypertension     4. SOB (shortness of breath)     5. Left ventricular systolic dysfunction     6. Hypokalemia         Medical Decision Making:  Today's Assessment / Status / Plan:       51-year-old male with heart failure with reduced ejection fraction now normalized. He is doing well. No changes to his medications we will keep him on the same dose of everything he is taking. We will see him back in 3 months.     1. CHFrEF 45%, NYHA 1, Stage C, Hemo Pro A    - cont coreg 3.125 BID    - cont lisinopril 40     - Hydral/Imdur - not indicated    - ICD - not indicated    - BiV - not indicated    - Cardiac PET normal    - Iron overload -see below     2. ETOH    - encourage sobriety     3. Cocaine      - encourage sobriety     4. Iron overload    -  heterozygous pattern    Thank for you allowing me to take part in your patient's care, please call should you have any questions or would like to discuss this patient.      Tam Rg M.D.  [address] [facility] [address] [address]  VIA Facsimile: [phone]

## 2017-12-19 ENCOUNTER — OFFICE VISIT (OUTPATIENT)
Dept: CARDIOLOGY | Facility: MEDICAL CENTER | Age: 52
End: 2017-12-19
Payer: COMMERCIAL

## 2017-12-19 VITALS
HEIGHT: 75 IN | BODY MASS INDEX: 35.56 KG/M2 | OXYGEN SATURATION: 97 % | HEART RATE: 80 BPM | SYSTOLIC BLOOD PRESSURE: 138 MMHG | DIASTOLIC BLOOD PRESSURE: 82 MMHG | WEIGHT: 286 LBS

## 2017-12-19 DIAGNOSIS — I51.9 LEFT VENTRICULAR SYSTOLIC DYSFUNCTION: ICD-10-CM

## 2017-12-19 DIAGNOSIS — Z79.899 HIGH RISK MEDICATIONS (NOT ANTICOAGULANTS) LONG-TERM USE: ICD-10-CM

## 2017-12-19 DIAGNOSIS — R06.02 SOB (SHORTNESS OF BREATH): ICD-10-CM

## 2017-12-19 DIAGNOSIS — E87.6 HYPOKALEMIA: ICD-10-CM

## 2017-12-19 DIAGNOSIS — I10 ESSENTIAL HYPERTENSION: ICD-10-CM

## 2017-12-19 PROCEDURE — 99214 OFFICE O/P EST MOD 30 MIN: CPT | Performed by: INTERNAL MEDICINE

## 2017-12-19 ASSESSMENT — ENCOUNTER SYMPTOMS
WEAKNESS: 0
SPUTUM PRODUCTION: 0
SHORTNESS OF BREATH: 0
CONSTITUTIONAL NEGATIVE: 1
ORTHOPNEA: 0
CLAUDICATION: 0
STRIDOR: 0
NEUROLOGICAL NEGATIVE: 1
PALPITATIONS: 0
MUSCULOSKELETAL NEGATIVE: 1
FEVER: 0
WHEEZING: 0
PND: 0
RESPIRATORY NEGATIVE: 1
CHILLS: 0
BRUISES/BLEEDS EASILY: 0
EYES NEGATIVE: 1
DIZZINESS: 0
COUGH: 0
LOSS OF CONSCIOUSNESS: 0
SORE THROAT: 0
HEMOPTYSIS: 0
CARDIOVASCULAR NEGATIVE: 1
GASTROINTESTINAL NEGATIVE: 1

## 2017-12-19 NOTE — PROGRESS NOTES
Subjective:   Milton Stewart is a 52 y.o. male who presents today as a follow-up for his substance induced cardiomyopathy. His EF was normalized. His blood pressure controlled. He is currently not taking any illicit substances. He does have some mistakes or errors on his carvedilol as he only takes 3 mg per day and often forgets to take the  Second dose in the evening. Otherwise no chest pain shortness breath palpitations or PND.    Past Medical History:   Diagnosis Date   • ASTHMA    • ETOH abuse      Past Surgical History:   Procedure Laterality Date   • BHARATHI BY LAPAROSCOPY  2005   • TONSILLECTOMY  1974     Family History   Problem Relation Age of Onset   • Heart Disease Mother    • Diabetes Mother    • Kidney Disease Mother      on dialysis   • Heart Disease Father    • Other Father      mechanical injuries   • Stroke Brother    • Heart Disease Brother      History   Smoking Status   • Never Smoker   Smokeless Tobacco   • Never Used     No Known Allergies  Outpatient Encounter Prescriptions as of 12/19/2017   Medication Sig Dispense Refill   • lisinopril (PRINIVIL, ZESTRIL) 40 MG tablet Take 1 Tab by mouth every day. 30 Tab 11   • carvedilol (COREG) 3.125 MG Tab Take 1 Tab by mouth 2 times a day, with meals. 180 Tab 3   • ALBUTEROL INH Inhale  by mouth.     • aspirin 81 MG EC tablet Take 1 Tab by mouth every day. 100 Tab 0   • nitroglycerin (NITROSTAT) 0.4 MG SL Tab Place 1 Tab under tongue as needed for Chest Pain (Up to 3 doses (if SBP greater than 90 mmHg)). 25 Tab 1   • fluticasone (FLOVENT HFA) 110 MCG/ACT Aerosol Inhale 2 Puffs by mouth 2 times a day.     • esomeprazole (NEXIUM) 20 MG capsule Take 20 mg by mouth as needed (For heartburn).     • acetaminophen (TYLENOL) 500 MG Tab Take 1,500 mg by mouth every 6 hours as needed for Moderate Pain.     • alprazolam (XANAX) 1 MG Tab Take 0.5-1 mg by mouth at bedtime as needed for Anxiety.       No facility-administered encounter medications on file  "as of 12/19/2017.      Review of Systems   Constitutional: Negative.  Negative for chills, fever and malaise/fatigue.   HENT: Negative.  Negative for sore throat.    Eyes: Negative.    Respiratory: Negative.  Negative for cough, hemoptysis, sputum production, shortness of breath, wheezing and stridor.    Cardiovascular: Negative.  Negative for chest pain, palpitations, orthopnea, claudication, leg swelling and PND.   Gastrointestinal: Negative.    Genitourinary: Negative.    Musculoskeletal: Negative.    Skin: Negative.    Neurological: Negative.  Negative for dizziness, loss of consciousness and weakness.   Endo/Heme/Allergies: Negative.  Does not bruise/bleed easily.   All other systems reviewed and are negative.       Objective:   /82   Pulse 80   Ht 1.905 m (6' 3\")   Wt (!) 129.7 kg (286 lb)   SpO2 97%   BMI 35.75 kg/m²     Physical Exam   Constitutional: He is oriented to person, place, and time. He appears well-developed and well-nourished. No distress.   HENT:   Head: Normocephalic.   Mouth/Throat: Oropharynx is clear and moist.   Eyes: EOM are normal. Pupils are equal, round, and reactive to light. Right eye exhibits no discharge. Left eye exhibits no discharge. No scleral icterus.   Neck: Normal range of motion. Neck supple. No JVD present. No tracheal deviation present.   Cardiovascular: Normal rate, regular rhythm, S1 normal, S2 normal, normal heart sounds, intact distal pulses and normal pulses.  Exam reveals no gallop, no S3, no S4 and no friction rub.    No murmur heard.   No systolic murmur is present    No diastolic murmur is present   Pulses:       Carotid pulses are 2+ on the right side, and 2+ on the left side.       Radial pulses are 2+ on the right side, and 2+ on the left side.        Dorsalis pedis pulses are 2+ on the right side, and 2+ on the left side.        Posterior tibial pulses are 2+ on the right side, and 2+ on the left side.   Pulmonary/Chest: Effort normal and breath " sounds normal. No respiratory distress. He has no wheezes. He has no rales.   Abdominal: Soft. Bowel sounds are normal. He exhibits no distension and no mass. There is no tenderness. There is no rebound and no guarding.   Musculoskeletal: He exhibits no edema.   Neurological: He is alert and oriented to person, place, and time. No cranial nerve deficit.   Skin: Skin is warm and dry. He is not diaphoretic. No pallor.   Psychiatric: He has a normal mood and affect. His behavior is normal. Judgment and thought content normal.   Nursing note and vitals reviewed.      Assessment:     1. Left ventricular systolic dysfunction     2. SOB (shortness of breath)     3. Hypokalemia     4. Essential hypertension     5. High risk medications (not anticoagulants) long-term use         Medical Decision Making:  Today's Assessment / Status / Plan:     52-year-old male with substance induced  Cardio myopathy. He is otherwise doing well. We will see him back in 6 months. No changes his medications today.    1. CHFrEF 45%, NYHA 1, Stage C, Hemo Pro A    - cont coreg 3.125 BID    - cont lisinopril 40     - Hydral/Imdur - not indicated    - ICD - not indicated    - BiV - not indicated    - Cardiac PET normal    - Iron overload -see below     2. ETOH    - encourage sobriety     3. Cocaine      - encourage sobriety     4. Iron overload    - heterozygous pattern    Thank for you allowing me to take part in your patient's care, please call should you have any questions or would like to discuss this patient.

## 2017-12-19 NOTE — LETTER
[facility]   [address]  [place] [address]  Phone: [phone]  Fax: [phone]              Milton Stewart  1965    Encounter Date: 12/19/2017    Gomez Tesfaye M.D.          PROGRESS NOTE:  Subjective:   Milton Stewart is a 52 y.o. male who presents today as a follow-up for his substance induced cardiomyopathy. His EF was normalized. His blood pressure controlled. He is currently not taking any illicit substances. He does have some mistakes or errors on his carvedilol as he only takes 3 mg per day and often forgets to take the  Second dose in the evening. Otherwise no chest pain shortness breath palpitations or PND.    Past Medical History:   Diagnosis Date   • ASTHMA    • ETOH abuse      Past Surgical History:   Procedure Laterality Date   • BHARATHI BY LAPAROSCOPY  2005   • TONSILLECTOMY  1974     Family History   Problem Relation Age of Onset   • Heart Disease Mother    • Diabetes Mother    • Kidney Disease Mother      on dialysis   • Heart Disease Father    • Other Father      mechanical injuries   • Stroke Brother    • Heart Disease Brother      History   Smoking Status   • Never Smoker   Smokeless Tobacco   • Never Used     No Known Allergies  Outpatient Encounter Prescriptions as of 12/19/2017   Medication Sig Dispense Refill   • lisinopril (PRINIVIL, ZESTRIL) 40 MG tablet Take 1 Tab by mouth every day. 30 Tab 11   • carvedilol (COREG) 3.125 MG Tab Take 1 Tab by mouth 2 times a day, with meals. 180 Tab 3   • ALBUTEROL INH Inhale  by mouth.     • aspirin 81 MG EC tablet Take 1 Tab by mouth every day. 100 Tab 0   • nitroglycerin (NITROSTAT) 0.4 MG SL Tab Place 1 Tab under tongue as needed for Chest Pain (Up to 3 doses (if SBP greater than 90 mmHg)). 25 Tab 1   • fluticasone (FLOVENT HFA) 110 MCG/ACT Aerosol Inhale 2 Puffs by mouth 2 times a day.     • esomeprazole (NEXIUM) 20 MG capsule Take 20 mg by mouth as  "needed (For heartburn).     • acetaminophen (TYLENOL) 500 MG Tab Take 1,500 mg by mouth every 6 hours as needed for Moderate Pain.     • alprazolam (XANAX) 1 MG Tab Take 0.5-1 mg by mouth at bedtime as needed for Anxiety.       No facility-administered encounter medications on file as of 12/19/2017.      Review of Systems   Constitutional: Negative.  Negative for chills, fever and malaise/fatigue.   HENT: Negative.  Negative for sore throat.    Eyes: Negative.    Respiratory: Negative.  Negative for cough, hemoptysis, sputum production, shortness of breath, wheezing and stridor.    Cardiovascular: Negative.  Negative for chest pain, palpitations, orthopnea, claudication, leg swelling and PND.   Gastrointestinal: Negative.    Genitourinary: Negative.    Musculoskeletal: Negative.    Skin: Negative.    Neurological: Negative.  Negative for dizziness, loss of consciousness and weakness.   Endo/Heme/Allergies: Negative.  Does not bruise/bleed easily.   All other systems reviewed and are negative.       Objective:   /82   Pulse 80   Ht 1.905 m (6' 3\")   Wt (!) 129.7 kg (286 lb)   SpO2 97%   BMI 35.75 kg/m²      Physical Exam   Constitutional: He is oriented to person, place, and time. He appears well-developed and well-nourished. No distress.   HENT:   Head: Normocephalic.   Mouth/Throat: Oropharynx is clear and moist.   Eyes: EOM are normal. Pupils are equal, round, and reactive to light. Right eye exhibits no discharge. Left eye exhibits no discharge. No scleral icterus.   Neck: Normal range of motion. Neck supple. No JVD present. No tracheal deviation present.   Cardiovascular: Normal rate, regular rhythm, S1 normal, S2 normal, normal heart sounds, intact distal pulses and normal pulses.  Exam reveals no gallop, no S3, no S4 and no friction rub.    No murmur heard.   No systolic murmur is present    No diastolic murmur is present   Pulses:       Carotid pulses are 2+ on the right side, and 2+ on the left " side.       Radial pulses are 2+ on the right side, and 2+ on the left side.        Dorsalis pedis pulses are 2+ on the right side, and 2+ on the left side.        Posterior tibial pulses are 2+ on the right side, and 2+ on the left side.   Pulmonary/Chest: Effort normal and breath sounds normal. No respiratory distress. He has no wheezes. He has no rales.   Abdominal: Soft. Bowel sounds are normal. He exhibits no distension and no mass. There is no tenderness. There is no rebound and no guarding.   Musculoskeletal: He exhibits no edema.   Neurological: He is alert and oriented to person, place, and time. No cranial nerve deficit.   Skin: Skin is warm and dry. He is not diaphoretic. No pallor.   Psychiatric: He has a normal mood and affect. His behavior is normal. Judgment and thought content normal.   Nursing note and vitals reviewed.      Assessment:     1. Left ventricular systolic dysfunction     2. SOB (shortness of breath)     3. Hypokalemia     4. Essential hypertension     5. High risk medications (not anticoagulants) long-term use         Medical Decision Making:  Today's Assessment / Status / Plan:     52-year-old male with substance induced  Cardio myopathy. He is otherwise doing well. We will see him back in 6 months. No changes his medications today.    1. CHFrEF 45%, NYHA 1, Stage C, Hemo Pro A    - cont coreg 3.125 BID    - cont lisinopril 40     - Hydral/Imdur - not indicated    - ICD - not indicated    - BiV - not indicated    - Cardiac PET normal    - Iron overload -see below     2. ETOH    - encourage sobriety     3. Cocaine      - encourage sobriety     4. Iron overload    - heterozygous pattern    Thank for you allowing me to take part in your patient's care, please call should you have any questions or would like to discuss this patient.      Tam Rg M.D.  [address] [address]  VIA Facsimile: [phone]

## 2018-03-27 ENCOUNTER — HOSPITAL ENCOUNTER (OUTPATIENT)
Dept: LAB | Facility: MEDICAL CENTER | Age: 53
End: 2018-03-27
Attending: EMERGENCY MEDICINE
Payer: COMMERCIAL

## 2018-03-27 LAB
25(OH)D3 SERPL-MCNC: 23 NG/ML (ref 30–100)
ALBUMIN SERPL BCP-MCNC: 4.1 G/DL (ref 3.2–4.9)
ALBUMIN/GLOB SERPL: 1.4 G/DL
ALP SERPL-CCNC: 39 U/L (ref 30–99)
ALT SERPL-CCNC: 24 U/L (ref 2–50)
ANION GAP SERPL CALC-SCNC: 7 MMOL/L (ref 0–11.9)
APPEARANCE UR: CLEAR
AST SERPL-CCNC: 22 U/L (ref 12–45)
BASOPHILS # BLD AUTO: 1 % (ref 0–1.8)
BASOPHILS # BLD: 0.06 K/UL (ref 0–0.12)
BILIRUB SERPL-MCNC: 0.9 MG/DL (ref 0.1–1.5)
BILIRUB UR QL STRIP.AUTO: NEGATIVE
BUN SERPL-MCNC: 13 MG/DL (ref 8–22)
CALCIUM SERPL-MCNC: 8.8 MG/DL (ref 8.5–10.5)
CHLORIDE SERPL-SCNC: 106 MMOL/L (ref 96–112)
CHOLEST SERPL-MCNC: 164 MG/DL (ref 100–199)
CO2 SERPL-SCNC: 24 MMOL/L (ref 20–33)
COLOR UR: YELLOW
CORTIS SERPL-MCNC: 11.2 UG/DL (ref 0–23)
CREAT SERPL-MCNC: 0.85 MG/DL (ref 0.5–1.4)
CRP SERPL HS-MCNC: 1.9 MG/L (ref 0–7.5)
DHEA-S SERPL-MCNC: 201.6 UG/DL (ref 44.3–331)
EOSINOPHIL # BLD AUTO: 0.22 K/UL (ref 0–0.51)
EOSINOPHIL NFR BLD: 3.8 % (ref 0–6.9)
ERYTHROCYTE [DISTWIDTH] IN BLOOD BY AUTOMATED COUNT: 50.2 FL (ref 35.9–50)
EST. AVERAGE GLUCOSE BLD GHB EST-MCNC: 100 MG/DL
ESTRADIOL SERPL-MCNC: 21 PG/ML
GGT SERPL-CCNC: 24 U/L (ref 7–51)
GLOBULIN SER CALC-MCNC: 3 G/DL (ref 1.9–3.5)
GLUCOSE SERPL-MCNC: 105 MG/DL (ref 65–99)
GLUCOSE UR STRIP.AUTO-MCNC: NEGATIVE MG/DL
HBA1C MFR BLD: 5.1 % (ref 0–5.6)
HCT VFR BLD AUTO: 55.4 % (ref 42–52)
HCYS SERPL-SCNC: 9.71 UMOL/L
HDLC SERPL-MCNC: 46 MG/DL
HGB BLD-MCNC: 18.4 G/DL (ref 14–18)
IMM GRANULOCYTES # BLD AUTO: 0.01 K/UL (ref 0–0.11)
IMM GRANULOCYTES NFR BLD AUTO: 0.2 % (ref 0–0.9)
KETONES UR STRIP.AUTO-MCNC: NEGATIVE MG/DL
LDH SERPL L TO P-CCNC: 158 U/L (ref 107–266)
LDLC SERPL CALC-MCNC: 107 MG/DL
LEUKOCYTE ESTERASE UR QL STRIP.AUTO: NEGATIVE
LYMPHOCYTES # BLD AUTO: 1.56 K/UL (ref 1–4.8)
LYMPHOCYTES NFR BLD: 27.2 % (ref 22–41)
MCH RBC QN AUTO: 30.5 PG (ref 27–33)
MCHC RBC AUTO-ENTMCNC: 33.2 G/DL (ref 33.7–35.3)
MCV RBC AUTO: 91.9 FL (ref 81.4–97.8)
MICRO URNS: NORMAL
MONOCYTES # BLD AUTO: 0.44 K/UL (ref 0–0.85)
MONOCYTES NFR BLD AUTO: 7.7 % (ref 0–13.4)
NEUTROPHILS # BLD AUTO: 3.44 K/UL (ref 1.82–7.42)
NEUTROPHILS NFR BLD: 60.1 % (ref 44–72)
NITRITE UR QL STRIP.AUTO: NEGATIVE
NRBC # BLD AUTO: 0 K/UL
NRBC BLD-RTO: 0 /100 WBC
PH UR STRIP.AUTO: 5.5 [PH]
PHOSPHATE SERPL-MCNC: 2.3 MG/DL (ref 2.5–4.5)
PLATELET # BLD AUTO: 189 K/UL (ref 164–446)
PMV BLD AUTO: 10.1 FL (ref 9–12.9)
POTASSIUM SERPL-SCNC: 3.9 MMOL/L (ref 3.6–5.5)
PROT SERPL-MCNC: 7.1 G/DL (ref 6–8.2)
PROT UR QL STRIP: NEGATIVE MG/DL
PSA SERPL-MCNC: 0.63 NG/ML (ref 0–4)
RBC # BLD AUTO: 6.03 M/UL (ref 4.7–6.1)
RBC UR QL AUTO: NEGATIVE
SODIUM SERPL-SCNC: 137 MMOL/L (ref 135–145)
SP GR UR STRIP.AUTO: 1.02
T3FREE SERPL-MCNC: 3.28 PG/ML (ref 2.4–4.2)
T4 FREE SERPL-MCNC: 1 NG/DL (ref 0.53–1.43)
TRIGL SERPL-MCNC: 56 MG/DL (ref 0–149)
TSH SERPL DL<=0.005 MIU/L-ACNC: 1.85 UIU/ML (ref 0.38–5.33)
URATE SERPL-MCNC: 7.3 MG/DL (ref 2.5–8.3)
UROBILINOGEN UR STRIP.AUTO-MCNC: 0.2 MG/DL
WBC # BLD AUTO: 5.7 K/UL (ref 4.8–10.8)

## 2018-03-27 PROCEDURE — 84443 ASSAY THYROID STIM HORMONE: CPT

## 2018-03-27 PROCEDURE — 84100 ASSAY OF PHOSPHORUS: CPT

## 2018-03-27 PROCEDURE — 85025 COMPLETE CBC W/AUTO DIFF WBC: CPT

## 2018-03-27 PROCEDURE — 82977 ASSAY OF GGT: CPT

## 2018-03-27 PROCEDURE — 83525 ASSAY OF INSULIN: CPT

## 2018-03-27 PROCEDURE — 80053 COMPREHEN METABOLIC PANEL: CPT

## 2018-03-27 PROCEDURE — 82670 ASSAY OF TOTAL ESTRADIOL: CPT

## 2018-03-27 PROCEDURE — 84550 ASSAY OF BLOOD/URIC ACID: CPT

## 2018-03-27 PROCEDURE — 84270 ASSAY OF SEX HORMONE GLOBUL: CPT

## 2018-03-27 PROCEDURE — 84305 ASSAY OF SOMATOMEDIN: CPT

## 2018-03-27 PROCEDURE — 83090 ASSAY OF HOMOCYSTEINE: CPT

## 2018-03-27 PROCEDURE — 84439 ASSAY OF FREE THYROXINE: CPT

## 2018-03-27 PROCEDURE — 86141 C-REACTIVE PROTEIN HS: CPT

## 2018-03-27 PROCEDURE — 84403 ASSAY OF TOTAL TESTOSTERONE: CPT

## 2018-03-27 PROCEDURE — 83615 LACTATE (LD) (LDH) ENZYME: CPT

## 2018-03-27 PROCEDURE — 80061 LIPID PANEL: CPT

## 2018-03-27 PROCEDURE — 36415 COLL VENOUS BLD VENIPUNCTURE: CPT

## 2018-03-27 PROCEDURE — 82306 VITAMIN D 25 HYDROXY: CPT

## 2018-03-27 PROCEDURE — 84481 FREE ASSAY (FT-3): CPT

## 2018-03-27 PROCEDURE — 82627 DEHYDROEPIANDROSTERONE: CPT

## 2018-03-27 PROCEDURE — 81003 URINALYSIS AUTO W/O SCOPE: CPT

## 2018-03-27 PROCEDURE — 82533 TOTAL CORTISOL: CPT

## 2018-03-27 PROCEDURE — 84153 ASSAY OF PSA TOTAL: CPT

## 2018-03-27 PROCEDURE — 83036 HEMOGLOBIN GLYCOSYLATED A1C: CPT

## 2018-03-27 PROCEDURE — 84402 ASSAY OF FREE TESTOSTERONE: CPT

## 2018-03-29 LAB
IGF-I SERPL-MCNC: 100 NG/ML (ref 64–218)
IGF-I Z-SCORE SERPL: -0.8
INSULIN P FAST SERPL-ACNC: 5 UIU/ML (ref 3–19)
SHBG SERPL-SCNC: 25 NMOL/L (ref 11–80)
TESTOST FREE MFR SERPL: 2.3 % (ref 1.6–2.9)
TESTOST FREE SERPL-MCNC: 149 PG/ML (ref 47–244)
TESTOST SERPL-MCNC: 662 NG/DL (ref 300–890)

## 2018-06-15 ENCOUNTER — HOSPITAL ENCOUNTER (EMERGENCY)
Facility: MEDICAL CENTER | Age: 53
End: 2018-06-15
Attending: EMERGENCY MEDICINE
Payer: COMMERCIAL

## 2018-06-15 VITALS
BODY MASS INDEX: 32.89 KG/M2 | HEART RATE: 76 BPM | SYSTOLIC BLOOD PRESSURE: 123 MMHG | DIASTOLIC BLOOD PRESSURE: 77 MMHG | RESPIRATION RATE: 16 BRPM | OXYGEN SATURATION: 96 % | HEIGHT: 75 IN | WEIGHT: 264.55 LBS | TEMPERATURE: 98 F

## 2018-06-15 DIAGNOSIS — S46.211A RUPTURE OF RIGHT DISTAL BICEPS TENDON, INITIAL ENCOUNTER: ICD-10-CM

## 2018-06-15 PROCEDURE — 99283 EMERGENCY DEPT VISIT LOW MDM: CPT

## 2018-06-15 ASSESSMENT — PAIN SCALES - GENERAL
PAINLEVEL_OUTOF10: 5
PAINLEVEL_OUTOF10: 5

## 2018-06-16 ENCOUNTER — PATIENT OUTREACH (OUTPATIENT)
Dept: HEALTH INFORMATION MANAGEMENT | Facility: OTHER | Age: 53
End: 2018-06-16

## 2018-06-16 NOTE — ED PROVIDER NOTES
"ED Provider Note    CHIEF COMPLAINT  Chief Complaint   Patient presents with   • Other       HPI  Milton Stewart is a 52 y.o. male who presents To the ER complaining of right biceps rupture.  The patient was lifting a refrigerator into an RV when someone who is helping him and seemed to lose her grass.  He had an immediate load in the right arm and then felt a tear just above his right elbow feels like his bicep is torn.  Denies any direct trauma.  Denies any falls.  No numbness or tingling distally.  No other injuries or complaints.    REVIEW OF SYSTEMS  See HPI for further details.  Musculoskeletal: No other muscular skeletal injuries or complaints    PAST MEDICAL HISTORY  Past Medical History:   Diagnosis Date   • ASTHMA    • ETOH abuse        CURRENT MEDICATIONS  Home Medications     Reviewed by Carson Jefferson R.N. (Registered Nurse) on 06/15/18 at 1936  Med List Status: Partial   Medication Last Dose Status   acetaminophen (TYLENOL) 500 MG Tab  Active   ALBUTEROL INH  Active   alprazolam (XANAX) 1 MG Tab  Active   aspirin 81 MG EC tablet 6/15/2018 Active   carvedilol (COREG) 3.125 MG Tab Not taking Active   esomeprazole (NEXIUM) 20 MG capsule  Active   fluticasone (FLOVENT HFA) 110 MCG/ACT Aerosol  Active   lisinopril (PRINIVIL, ZESTRIL) 40 MG tablet Not taking Active   nitroglycerin (NITROSTAT) 0.4 MG SL Tab  Active                ALLERGIES  No Known Allergies    PHYSICAL EXAM  VITAL SIGNS: /78   Pulse 77   Temp 36.7 °C (98 °F)   Resp 18   Ht 1.905 m (6' 3\") Comment: Stated  Wt 120 kg (264 lb 8.8 oz)   SpO2 94%   BMI 33.07 kg/m²    Constitutional: Well developed, Well nourished, No acute distress, Non-toxic appearance.   HENT: Normocephalic, Atraumatic,    Cardiovascular: Normal heart rate, Normal rhythm, No murmurs, No rubs, No gallops.   Thorax & Lungs: Normal breath sounds, No respiratory distress, No wheezing,   Abdomen: Bowel sounds normal, Soft, No tenderness,  Skin: Warm, Dry, " No erythema, No rash.   Back: No tenderness, No CVA tenderness.    Musculoskeletal:Right upper extremity has a defect at the insertion of the bicep.  Some pain with range of motion, but fairly limited pain.  There is tenderness of the insertion of the bicep.  There is a normal neurovascular exam.  Soft compartments and good passive range of motion.  No bony tenderness over the bones of the forearm, or the humerus.      COURSE & MEDICAL DECISION MAKING  Pertinent Labs & Imaging studies reviewed. (See chart for details)  The patient presents with clinically looks at her biceps rupture on the distal component.  There is no bony tenderness.  I don't think he requires x-rays.  The patient will be put in a sling and referred to the orthopedic doctor on call, Dr. Hernandez.  He is asked to return to the ER for pain, swelling, numbness, or other concerns.  Advise NSAIDs and rest.  He is agreeable with the plan.    Quique Hernandez M.D.  [address]  [address]  [facility] [id number] [phone]    Schedule an appointment as soon as possible for a visit in 3 days      Nurses to place him in a sling and a loose Ace wrap.    FINAL IMPRESSION  1. Rupture of right distal biceps tendon, initial encounter        2.   3.         Electronically signed by: Mickey Perez, 6/15/2018 8:39 PM

## 2018-06-16 NOTE — PROGRESS NOTES
Placed discharge outreach phone call to patient s/p ER discharge 6/15/18.  Left voicemail providing my contact information and instructions to call with any questions or concerns.     Addendum--6/17/18 at 9:05 AM--Received phone call from pt, discharge outreach completed.

## 2018-06-16 NOTE — DISCHARGE INSTRUCTIONS
Keep her arm in a sling.  Follow-up with orthopedic doctor on Monday.  Return for pain, numbness, weakness or other concerns.  Ibuprofen for pain.    Biceps Tendon Disruption (Distal)  The distal biceps tendon is a strong cord of tissue that connects the biceps muscle, on the front of the upper arm, to a bone (radius) in the elbow. A distal biceps tendon disruption can interfere with the ability to turn the hand palm-up (supination) and bend (flex) the elbow. This is an uncommon injury that may include one or both of the following:  · A complete tear (rupture) in the tendon, causing the tendon to completely separate from the radius. When this happens, the biceps muscle pulls up (retracts) toward the shoulder.  · A partial tear in the tendon that causes the tendon to partially separate from the radius. This is less common than a tendon rupture.  This condition is commonly treated with surgery, and recovery may take 4-8 months. Your health care provider will decide when it is safe for you to return to contact sports.  What are the causes?  This condition is usually caused by suddenly straightening the elbow while the biceps muscle is tightened (contracted). This could happen, for example, while lifting or carrying a heavy object that suddenly falls or shifts position.  What increases the risk?  The following factors may increase your risk of developing this condition:  · Being a man who is 30-50 years old.  · Smoking.  · Using steroids.  What are the signs or symptoms?  Symptoms of this condition may include:  · Feeling a “pop” in the elbow at the time of injury.  · Pain, swelling, and bruising in the front of the elbow.  · Weakness in the arm when doing certain movements, such as:  ¨ Lifting or carrying objects.  ¨ Rotating the forearm, such as when you turn a screwdriver.  ¨ Bending the elbow.  · A bulge in the upper arm. You may feel this in the front of the arm, the inside of the arm, or both.  · Limited range of  motion of the elbow and forearm.  How is this diagnosed?  This condition is diagnosed based on your symptoms, your medical history, and a physical exam. Your health care provider may test your range of motion by having you do arm movements. You may have tests, including:  · X-rays.  · Ultrasound. This uses sound waves to make an image of the affected area.  · MRI.  How is this treated?  Treatment for this condition usually includes one or more of the following:  · Resting from sports and intense physical activity.  · Physical therapy.  · Surgery to reattach your tendon to your radius. This is the most common treatment method. Your elbow will be kept in place (immobilized) with a cast immediately after surgery.  · A splint or brace to immobilize your elbow. If you had surgery and you received a cast after surgery, you will get a splint or brace to replace your cast when you start physical therapy.  In some cases, no treatment may be needed for this condition.  Follow these instructions at home:  If you have a splint or brace:  · Wear the splint or brace as told by your health care provider. Remove it only as told by your health care provider.  · Loosen the splint or brace if your fingers tingle, become numb, or turn cold and blue.  · Do not let your splint or brace get wet if it is not waterproof.  ¨ If you have a splint or brace, do not take baths, swim, or use a hot tub until your health care provider approves. Ask your health care provider if you can take showers. You may only be allowed to take sponge baths for bathing.  ¨ If your splint or brace is not waterproof, cover it with a watertight covering when you take a bath or a shower.  · Keep the splint or brace clean.  If you have a cast:  · Do not stick anything inside the cast to scratch your skin. Doing that increases your risk of infection.  · Check the skin around the cast every day. Tell your health care provider about any concerns.  · You may put lotion on  dry skin around the edges of the cast. Do not put lotion on the skin underneath the cast.  · Keep the cast clean.  · If the cast is not waterproof:  ¨ Do not let it get wet.  ¨ Cover it with a watertight covering when you take a bath or a shower.  Managing pain, stiffness, and swelling  · If directed, put ice on the injured area:  ¨ Put ice in a plastic bag.  ¨ Place a towel between your skin and the bag.  ¨ Leave the ice on for 20 minutes, 2-3 times a day.  · If directed, apply heat to the affected area before you exercise or as often as told by your health care provider. Use the heat source that your health care provider recommends, such as a moist heat pack or a heating pad.  ¨ Place a towel between your skin and the heat source.  ¨ Leave the heat on for 20-30 minutes.  ¨ Remove the heat if your skin turns bright red. This is especially important if you are unable to feel pain, heat, or cold. You may have a greater risk of getting burned.  · Move your fingers often to avoid stiffness and to lessen swelling.  · Raise (elevate) the injured area above the level of your heart while you are sitting or lying down.  Driving  · Do not drive or operate heavy machinery while taking prescription pain medicine.  · Ask your health care provider when it is safe to drive if you have a cast, brace, splint, or sling on your arm.  Activity  · Return to your normal activities as told by your health care provider. Ask your health care provider what activities are safe for you.  · Avoid activities that cause pain or make your condition worse.  · Do not participate in contact sports until your health care provider approves.  · Do not lift or carry anything with your injured arm until your health care provider approves.  · Do exercises as told by your health care provider.  Safety  · Do not use the affected limb to support your body weight until your health care provider says that you can.  General instructions  · Take over-the-counter  and prescription medicines only as told by your health care provider.  · If you have a cast or splint, do not put pressure on any part of the cast or splint until it is fully hardened. This may take several hours.  · Keep all follow-up visits as told by your health care provider. This is important.  Contact a health care provider if:  · Your cast, splint, or brace causes pain or numbness.  Get help right away if:  · You develop severe pain.  · You develop numbness or tingling in your hand.  · Your hand feels unusually cold.  · Your fingernails turn a dark color, such as blue or gray.  This information is not intended to replace advice given to you by your health care provider. Make sure you discuss any questions you have with your health care provider.  Document Released: 12/18/2006 Document Revised: 08/24/2017 Document Reviewed: 09/11/2016  [employer] Interactive Patient Education © 2017 [employer]

## 2018-06-18 ENCOUNTER — HOSPITAL ENCOUNTER (OUTPATIENT)
Dept: RADIOLOGY | Facility: MEDICAL CENTER | Age: 53
End: 2018-06-18
Attending: PHYSICIAN ASSISTANT
Payer: COMMERCIAL

## 2018-06-18 DIAGNOSIS — M79.621 PAIN OF RIGHT UPPER ARM: ICD-10-CM

## 2018-06-18 DIAGNOSIS — S46.111A PARTIAL DEGENERATIVE RUPTURE OF BICEPS TENDON, RIGHT: ICD-10-CM

## 2018-06-18 PROCEDURE — 73221 MRI JOINT UPR EXTREM W/O DYE: CPT | Mod: RT

## 2018-06-21 ENCOUNTER — APPOINTMENT (OUTPATIENT)
Dept: ADMISSIONS | Facility: MEDICAL CENTER | Age: 53
End: 2018-06-21
Attending: ORTHOPAEDIC SURGERY
Payer: COMMERCIAL

## 2018-06-21 NOTE — OR NURSING
"Preparing for your Procedure information\" sheet given to patient with verbal and written instructions. Patient instructed to continue prescribed medications through the day before surgery, instructed to take the following medications the day of surgery per anesthesia protocol  Nexium and albuterol inhaler as needed. Pt states he has not taken coreg or lisinopril for 6 weeks since weight loss has been normotensive per pt., pt did not notify his PMD he has stopped those. Pt scores STOPBANG of 5 per questionaire. Call to Dr. Mejia's MA  Left ms re: pt stopping his coreg, lisinopril 6 weeks ago on his own and did not inform his PMD of this.   "

## 2018-06-22 ENCOUNTER — HOSPITAL ENCOUNTER (OUTPATIENT)
Facility: MEDICAL CENTER | Age: 53
End: 2018-06-22
Attending: ORTHOPAEDIC SURGERY | Admitting: ORTHOPAEDIC SURGERY
Payer: COMMERCIAL

## 2018-06-22 VITALS
BODY MASS INDEX: 32.24 KG/M2 | HEIGHT: 75 IN | TEMPERATURE: 96.8 F | WEIGHT: 259.26 LBS | SYSTOLIC BLOOD PRESSURE: 138 MMHG | DIASTOLIC BLOOD PRESSURE: 94 MMHG | OXYGEN SATURATION: 95 % | RESPIRATION RATE: 16 BRPM

## 2018-06-22 DIAGNOSIS — S46.211A BICEPS RUPTURE, DISTAL, RIGHT, INITIAL ENCOUNTER: ICD-10-CM

## 2018-06-22 PROCEDURE — 500881 HCHG PACK, EXTREMITY: Performed by: ORTHOPAEDIC SURGERY

## 2018-06-22 PROCEDURE — 160028 HCHG SURGERY MINUTES - 1ST 30 MINS LEVEL 3: Performed by: ORTHOPAEDIC SURGERY

## 2018-06-22 PROCEDURE — 160039 HCHG SURGERY MINUTES - EA ADDL 1 MIN LEVEL 3: Performed by: ORTHOPAEDIC SURGERY

## 2018-06-22 PROCEDURE — 160048 HCHG OR STATISTICAL LEVEL 1-5: Performed by: ORTHOPAEDIC SURGERY

## 2018-06-22 PROCEDURE — 700101 HCHG RX REV CODE 250

## 2018-06-22 PROCEDURE — 160022 HCHG BLOCK: Performed by: ORTHOPAEDIC SURGERY

## 2018-06-22 PROCEDURE — 700111 HCHG RX REV CODE 636 W/ 250 OVERRIDE (IP)

## 2018-06-22 PROCEDURE — 160025 RECOVERY II MINUTES (STATS): Performed by: ORTHOPAEDIC SURGERY

## 2018-06-22 PROCEDURE — C1713 ANCHOR/SCREW BN/BN,TIS/BN: HCPCS | Performed by: ORTHOPAEDIC SURGERY

## 2018-06-22 PROCEDURE — 160002 HCHG RECOVERY MINUTES (STAT): Performed by: ORTHOPAEDIC SURGERY

## 2018-06-22 PROCEDURE — 700111 HCHG RX REV CODE 636 W/ 250 OVERRIDE (IP): Performed by: ORTHOPAEDIC SURGERY

## 2018-06-22 PROCEDURE — 700105 HCHG RX REV CODE 258: Performed by: ORTHOPAEDIC SURGERY

## 2018-06-22 PROCEDURE — A6222 GAUZE <=16 IN NO W/SAL W/O B: HCPCS | Performed by: ORTHOPAEDIC SURGERY

## 2018-06-22 PROCEDURE — 501838 HCHG SUTURE GENERAL: Performed by: ORTHOPAEDIC SURGERY

## 2018-06-22 PROCEDURE — 160009 HCHG ANES TIME/MIN: Performed by: ORTHOPAEDIC SURGERY

## 2018-06-22 PROCEDURE — 160035 HCHG PACU - 1ST 60 MINS PHASE I: Performed by: ORTHOPAEDIC SURGERY

## 2018-06-22 PROCEDURE — 160046 HCHG PACU - 1ST 60 MINS PHASE II: Performed by: ORTHOPAEDIC SURGERY

## 2018-06-22 PROCEDURE — A6454 SELF-ADHER BAND W>=3" <5"/YD: HCPCS | Performed by: ORTHOPAEDIC SURGERY

## 2018-06-22 PROCEDURE — 160036 HCHG PACU - EA ADDL 30 MINS PHASE I: Performed by: ORTHOPAEDIC SURGERY

## 2018-06-22 DEVICE — IMPLANTABLE DEVICE: Type: IMPLANTABLE DEVICE | Site: ELBOW | Status: FUNCTIONAL

## 2018-06-22 RX ORDER — ONDANSETRON 2 MG/ML
INJECTION INTRAMUSCULAR; INTRAVENOUS
Status: COMPLETED
Start: 2018-06-22 | End: 2018-06-22

## 2018-06-22 RX ORDER — DIPHENHYDRAMINE HYDROCHLORIDE 50 MG/ML
25 INJECTION INTRAMUSCULAR; INTRAVENOUS EVERY 6 HOURS PRN
Status: DISCONTINUED | OUTPATIENT
Start: 2018-06-22 | End: 2018-06-22 | Stop reason: HOSPADM

## 2018-06-22 RX ORDER — LIDOCAINE HYDROCHLORIDE 10 MG/ML
INJECTION, SOLUTION EPIDURAL; INFILTRATION; INTRACAUDAL; PERINEURAL
Status: COMPLETED
Start: 2018-06-22 | End: 2018-06-22

## 2018-06-22 RX ORDER — PROMETHAZINE HYDROCHLORIDE 25 MG/1
25 TABLET ORAL EVERY 6 HOURS PRN
Qty: 10 TAB | Refills: 0 | Status: SHIPPED | OUTPATIENT
Start: 2018-06-22 | End: 2019-12-09

## 2018-06-22 RX ORDER — SODIUM CHLORIDE, SODIUM LACTATE, POTASSIUM CHLORIDE, CALCIUM CHLORIDE 600; 310; 30; 20 MG/100ML; MG/100ML; MG/100ML; MG/100ML
1000 INJECTION, SOLUTION INTRAVENOUS
Status: DISCONTINUED | OUTPATIENT
Start: 2018-06-22 | End: 2018-06-22 | Stop reason: HOSPADM

## 2018-06-22 RX ORDER — BUPIVACAINE HYDROCHLORIDE AND EPINEPHRINE 2.5; 5 MG/ML; UG/ML
INJECTION, SOLUTION EPIDURAL; INFILTRATION; INTRACAUDAL; PERINEURAL
Status: DISCONTINUED | OUTPATIENT
Start: 2018-06-22 | End: 2018-06-22 | Stop reason: HOSPADM

## 2018-06-22 RX ORDER — ONDANSETRON 2 MG/ML
4 INJECTION INTRAMUSCULAR; INTRAVENOUS EVERY 4 HOURS PRN
Status: DISCONTINUED | OUTPATIENT
Start: 2018-06-22 | End: 2018-06-22 | Stop reason: HOSPADM

## 2018-06-22 RX ORDER — OXYCODONE HYDROCHLORIDE AND ACETAMINOPHEN 5; 325 MG/1; MG/1
1-2 TABLET ORAL EVERY 6 HOURS PRN
Qty: 30 TAB | Refills: 0 | Status: SHIPPED | OUTPATIENT
Start: 2018-06-22 | End: 2018-06-27

## 2018-06-22 RX ORDER — CEFAZOLIN SODIUM 1 G/3ML
INJECTION, POWDER, FOR SOLUTION INTRAMUSCULAR; INTRAVENOUS
Status: DISCONTINUED | OUTPATIENT
Start: 2018-06-22 | End: 2018-06-22 | Stop reason: HOSPADM

## 2018-06-22 RX ADMIN — ONDANSETRON 4 MG: 2 INJECTION INTRAMUSCULAR; INTRAVENOUS at 09:50

## 2018-06-22 RX ADMIN — SODIUM CHLORIDE, POTASSIUM CHLORIDE, SODIUM LACTATE AND CALCIUM CHLORIDE 1000 ML: 600; 310; 30; 20 INJECTION, SOLUTION INTRAVENOUS at 06:35

## 2018-06-22 RX ADMIN — LIDOCAINE HYDROCHLORIDE: 10 INJECTION, SOLUTION EPIDURAL; INFILTRATION; INTRACAUDAL; PERINEURAL at 06:35

## 2018-06-22 ASSESSMENT — PAIN SCALES - GENERAL
PAINLEVEL_OUTOF10: 2
PAINLEVEL_OUTOF10: 0

## 2018-06-22 NOTE — DISCHARGE INSTRUCTIONS
ACTIVITY: Rest and take it easy for the first 24 hours.  A responsible adult is recommended to remain with you during that time.  It is normal to feel sleepy.  We encourage you to not do anything that requires balance, judgment or coordination.    MILD FLU-LIKE SYMPTOMS ARE NORMAL. YOU MAY EXPERIENCE GENERALIZED MUSCLE ACHES, THROAT IRRITATION, HEADACHE AND/OR SOME NAUSEA.    FOR 24 HOURS DO NOT:  Drive, operate machinery or run household appliances.  Drink beer or alcoholic beverages.   Make important decisions or sign legal documents.    SPECIAL INSTRUCTIONS: Keep splint and dressings clean, dry, and intact.  Wear sling at all times, may remove for showering once block is worn off.  Non-weight bearing with operative arm while block is still present, then may weight bear as tolerated.  Check CMS (Circulation, motion, and sensitivity) every 4 hours.  Ambulate with assistance post-op day Zero (Friday, June 22), and then 3 times daily each subsequent day, unless instructed not to by MD.      DIET: To avoid nausea, slowly advance diet as tolerated, avoiding spicy or greasy foods for the first day.  Add more substantial food to your diet according to your physician's instructions.  Babies can be fed formula or breast milk as soon as they are hungry.  INCREASE FLUIDS AND FIBER TO AVOID CONSTIPATION.    SURGICAL DRESSING/BATHING: May shower with wound and dressing covered starting tomorrow (Saturday, June 23).    FOLLOW-UP APPOINTMENT:  A follow-up appointment should be arranged with your doctor in; call to schedule.    You should CALL YOUR PHYSICIAN if you develop:  Fever greater than 101 degrees F.  Pain not relieved by medication, or persistent nausea or vomiting.  Excessive bleeding (blood soaking through dressing) or unexpected drainage from the wound.  Extreme redness or swelling around the incision site, drainage of pus or foul smelling drainage.  Inability to urinate or empty your bladder within 8  hours.  Problems with breathing or chest pain.    You should call 911 if you develop problems with breathing or chest pain.  If you are unable to contact your doctor or surgical center, you should go to the nearest emergency room or urgent care center.      Dr Mejia's telephone #: [phone]    If any questions arise, call your doctor.  If your doctor is not available, please feel free to call the Surgical Center at [phone].  The Center is open Monday through Friday from 7AM to 7PM.  You can also call the HEALTH HOTLINE open 24 hours/day, 7 days/week and speak to a nurse at [phone], or toll free at [phone].    A registered nurse may call you a few days after your surgery to see how you are doing after your procedure.    MEDICATIONS: Resume taking daily medication.  Take prescribed pain medication with food.  If no medication is prescribed, you may take non-aspirin pain medication if needed.  PAIN MEDICATION CAN BE VERY CONSTIPATING.  Take a stool softener or laxative such as senokot, pericolace, or milk of magnesia if needed.    Prescription given prior to surgery.  No oral pain medication given in recovery.    If your physician has prescribed pain medication that includes Acetaminophen (Tylenol), do not take additional Acetaminophen (Tylenol) while taking the prescribed medication.    Depression / Suicide Risk    As you are discharged from this [facility] facility, it is important to learn how to keep safe from harming yourself.    Recognize the warning signs:  · Abrupt changes in personality, positive or negative- including increase in energy   · Giving away possessions  · Change in eating patterns- significant weight changes-  positive or negative  · Change in sleeping patterns- unable to sleep or sleeping all the time   · Unwillingness or inability to communicate  · Depression  · Unusual sadness, discouragement and loneliness  · Talk of wanting to die  · Neglect of personal  appearance   · Rebelliousness- reckless behavior  · Withdrawal from people/activities they love  · Confusion- inability to concentrate     If you or a loved one observes any of these behaviors or has concerns about self-harm, here's what you can do:  · Talk about it- your feelings and reasons for harming yourself  · Remove any means that you might use to hurt yourself (examples: pills, rope, extension cords, firearm)  · Get professional help from the community (Mental Health, Substance Abuse, psychological counseling)  · Do not be alone:Call your Safe Contact- someone whom you trust who will be there for you.  · Call your local CRISIS HOTLINE 599-9090 or 822-001-2751  · Call your local Children's Mobile Crisis Response Team [place] (669) 124-6130 or www.Matchbox  · Call the toll free National Suicide Prevention Hotlines   · National Suicide Prevention Lifeline 131-917-UDFQ (5671)  National [employer] Line Network 800-SUICIDE (409-1580)    Peripheral Nerve Block Discharge Instructions from Same Day Surgery and Inpatient :    What to Expect - Upper Extremity  You may experience numbness and weakness in arm  on the same side as your surgery  This is normal. For some people, this may be an unpleasant sensation. Be very careful with your numb limb  Ask for help when you need it  Shoulder Surgery Side Effects  In addition to numbness and weakness you may experience other symptoms  Other nerves that are close to those nerves injected can also be affected by local anesthesia  You may experience a hoarseness in your voice  Your breathing may feel different  You may also notice drooping of your eyelid, pupil constriction, and decreased sweating, on the side of your surgery  All of these side effects are normal and will resolve when the local anesthetic wears off   Prevent Injury  Protect the limb like a baby  Beware of exposing your limb to extreme heat or cold or trauma  The limb may be injured without you noticing  "because it is numb  Keep the limb elevated whenever possible  Do not sleep on the limb  Change the position of the limb regularly  Avoid putting pressure on your surgical limb  Pain Control  The initial block on the day of surgery will make your extremity feel \"numb\"  Any consecutive injection including prior to discharge from the hospital will make your extremity feel \"numb\"  You may feel an aching or burning when the local anesthesia starts to wear off  Take pain pills as prescribed by your surgeon  Call your surgeon or anesthesiologist if you do not have adequate pain control      Sleep Apnea  Sleep apnea is a condition in which breathing pauses or becomes shallow during sleep. Episodes of sleep apnea usually last 10 seconds or longer, and they may occur as many as 20 times an hour. Sleep apnea disrupts your sleep and keeps your body from getting the rest that it needs. This condition can increase your risk of certain health problems, including:  · Heart attack.  · Stroke.  · Obesity.  · Diabetes.  · Heart failure.  · Irregular heartbeat.  There are three kinds of sleep apnea:  · Obstructive sleep apnea. This kind is caused by a blocked or collapsed airway.  · Central sleep apnea. This kind happens when the part of the brain that controls breathing does not send the correct signals to the muscles that control breathing.  · Mixed sleep apnea. This is a combination of obstructive and central sleep apnea.  What are the causes?  The most common cause of this condition is a collapsed or blocked airway. An airway can collapse or become blocked if:  · Your throat muscles are abnormally relaxed.  · Your tongue and tonsils are larger than normal.  · You are overweight.  · Your airway is smaller than normal.  What increases the risk?  This condition is more likely to develop in people who:  · Are overweight.  · Smoke.  · Have a smaller than normal airway.  · Are elderly.  · Are male.  · Drink alcohol.  · Take sedatives or " tranquilizers.  · Have a family history of sleep apnea.  What are the signs or symptoms?  Symptoms of this condition include:  · Trouble staying asleep.  · Daytime sleepiness and tiredness.  · Irritability.  · Loud snoring.  · Morning headaches.  · Trouble concentrating.  · Forgetfulness.  · Decreased interest in sex.  · Unexplained sleepiness.  · Mood swings.  · Personality changes.  · Feelings of depression.  · Waking up often during the night to urinate.  · Dry mouth.  · Sore throat.  How is this diagnosed?  This condition may be diagnosed with:  · A medical history.  · A physical exam.  · A series of tests that are done while you are sleeping (sleep study). These tests are usually done in a sleep lab, but they may also be done at home.  How is this treated?  Treatment for this condition aims to restore normal breathing and to ease symptoms during sleep. It may involve managing health issues that can affect breathing, such as high blood pressure or obesity. Treatment may include:  · Sleeping on your side.  · Using a decongestant if you have nasal congestion.  · Avoiding the use of depressants, including alcohol, sedatives, and narcotics.  · Losing weight if you are overweight.  · Making changes to your diet.  · Quitting smoking.  · Using a device to open your airway while you sleep, such as:  ¨ An oral appliance. This is a custom-made mouthpiece that shifts your lower jaw forward.  ¨ A continuous positive airway pressure (CPAP) device. This device delivers oxygen to your airway through a mask.  ¨ A nasal expiratory positive airway pressure (EPAP) device. This device has valves that you put into each nostril.  ¨ A bi-level positive airway pressure (BPAP) device. This device delivers oxygen to your airway through a mask.  · Surgery if other treatments do not work. During surgery, excess tissue is removed to create a wider airway.  It is important to get treatment for sleep apnea. Without treatment, this condition  can lead to:  · High blood pressure.  · Coronary artery disease, heart attack.  · (Men) An inability to achieve or maintain an erection (impotence).  · Reduced thinking abilities.  Follow these instructions at home:  · Make any lifestyle changes that your health care provider recommends.  · Eat a healthy, well-balanced diet.  · Take over-the-counter and prescription medicines only as told by your health care provider.  · Avoid using depressants, including alcohol, sedatives, and narcotics.  · Take steps to lose weight if you are overweight.  · If you were given a device to open your airway while you sleep, use it only as told by your health care provider.  · Do not use any tobacco products, such as cigarettes, chewing tobacco, and e-cigarettes. If you need help quitting, ask your health care provider.  · Keep all follow-up visits as told by your health care provider. This is important.  Contact a health care provider if:  · The device that you received to open your airway during sleep is uncomfortable or does not seem to be working.  · Your symptoms do not improve.  · Your symptoms get worse.  Get help right away if:  · You develop chest pain.  · You develop shortness of breath.  · You develop discomfort in your back, arms, or stomach.  · You have trouble speaking.  · You have weakness on one side of your body.  · You have drooping in your face.  These symptoms may represent a serious problem that is an emergency. Do not wait to see if the symptoms will go away. Get medical help right away. Call your local emergency services (911 in the [place] Do not drive yourself to the hospital.   This information is not intended to replace advice given to you by your health care provider. Make sure you discuss any questions you have with your health care provider.  Document Released: 12/08/2003 Document Revised: 08/13/2017 Document Reviewed: 09/26/2016  [employer] Interactive Patient Education © 2017 [employer]

## 2018-06-22 NOTE — OR NURSING
0921: Patient to stage II from PACU.  Dressings to right arm clean, dry, and intact.  Patient assisted with changing by RN and is now sitting in recliner chair.  No reported nausea, reports tolerable pain 2/10.  0950: Patient reported feeling flush and nauseated during discharge instructions, pt hooked back up to fluids and zofran given.  1018: Discharge instructions and education provided to patient and spouse.  Discharge medications discussed, patient has no questions about discharge or discharge medication at this time.  Patient's O2 saturations have remained above 90% for 20 minutes before discharge from pacu 2, patient states he feels he is ready to go home. IV removed, tip intact.

## 2018-06-22 NOTE — OP REPORT
DATE OF SERVICE:  06/22/2018    SURGEON:  Ebenezer Mejia MD.    ASSISTANT:  Alejandro Urbano PA-C.    PREOPERATIVE DIAGNOSIS:  Right elbow distal biceps tendon tear.    POSTOPERATIVE DIAGNOSIS:  Right elbow distal biceps tendon tear.    PROCEDURE:  Right distal biceps tendon reinsertion.    ANESTHESIOLOGIST:  Milton Martinez DO.    ANESTHETIC:  Sedation with an interscalene block for postoperative pain   control.    INDICATIONS:  Patient was moving a refrigerator about 3-4 days ago and tore   his distal biceps, elected to proceed with operative intervention.  He was   explained the risks, benefits, and alternatives of procedure and recovery in   detail.  All questions were answered.  Informed consent was obtained.  Site   verification per protocol was done in the preop holding area in the operating   room.  Patient received appropriate preoperative antibiotics.    OPERATION:  After successful anesthesia, patient's right arm was prepped and   draped in the usual sterile fashion.  An incision was made in the proximal   forearm.  I dissected down carefully through the subcutaneous tissue,   protecting the lateral antebrachial cutaneous nerve.  I was then able to flex   them up and squeeze out the distal biceps tendon stump.  It was pretty frayed.    I trimmed up the edges and freshened up the end.  I whipstitched it with #2   sutures and tied to an Endobutton at the appropriate length.  Then I mobilized   a little bit of adhesions and scar.  Once I had adequate mobility, I   carefully dissected down to the bicipital tuberosity until the polyp passed   quite easily.  Once I had good visualization of the bicipital tuberosity, I   drilled a guide pin and an Endobutton reamer and then an 8 mm acorn reamer   which matched a #10 size.  This was done unicortically.  I used a Kerrison   rongeur to make an elliptical shaped volar hole.  After that, I irrigated out   with copious amounts of normal saline all the bony debris,  suctioned that out,   passed the Endobutton without difficulty, carefully by hand, flipped the   Endobutton and docked the tendon into position.  It was very stable.  I was   happy with the tension.  I then irrigated out the wound, closed the incision   with 2-0 Vicryl and 3-0 subcuticular Prolene.  Mastisol, Steri-Strips,   Xeroform, 4x4s, and a long arm splint was applied.    ESTIMATED BLOOD LOSS:  Minimal.    COMPLICATIONS:  None.  He also was placed in a sling.  Alejandro Urbano PA-C   was medically necessary for the case.  He helped with instrumentation,   retraction, and positioning.  Without his help, the case would not have been   as technically successful.    COMPONENTS USED:  One [employer] Endobutton.       ____________________________________     MD CHERYL TORRES / NIKKIE    DD:  06/22/2018 08:28:40  DT:  06/22/2018 09:04:00    D#:  [id number]  Job#:  [id number]

## 2018-06-22 NOTE — OR NURSING
0812: To PACU from OR via gurney, respirations spontaneous and non-laboredIcepack applied over c/d/i R arm surgical dressings. No pain, no nausea.  0830: No pain, no nausea. Awake and alert, tolerating decrease in supplemental O2.  0845: Still no pain, no nausea. Sitting up in gurney more. No pain, no nausea, again tolerating decrease in supplemental O2.  0900: Dull pain noted, but it is tolerable, no nausea, awake and alert, tolerating room air, sitting up in gurney more.  0915: Meets criteria to transfer to Stage 2, pain is tolerable, no nausea, awake and alert, tolerating room air. Report called to IDANIA Dee and pt readied for transfer.

## 2018-09-27 ENCOUNTER — HOSPITAL ENCOUNTER (OUTPATIENT)
Dept: LAB | Facility: MEDICAL CENTER | Age: 53
End: 2018-09-27
Attending: FAMILY MEDICINE
Payer: COMMERCIAL

## 2018-09-27 LAB
ALBUMIN SERPL BCP-MCNC: 4 G/DL (ref 3.2–4.9)
ALBUMIN/GLOB SERPL: 1.1 G/DL
ALP SERPL-CCNC: 37 U/L (ref 30–99)
ALT SERPL-CCNC: 29 U/L (ref 2–50)
ANION GAP SERPL CALC-SCNC: 7 MMOL/L (ref 0–11.9)
AST SERPL-CCNC: 24 U/L (ref 12–45)
BASOPHILS # BLD AUTO: 0.9 % (ref 0–1.8)
BASOPHILS # BLD: 0.06 K/UL (ref 0–0.12)
BILIRUB SERPL-MCNC: 0.8 MG/DL (ref 0.1–1.5)
BNP SERPL-MCNC: 7 PG/ML (ref 0–100)
BUN SERPL-MCNC: 21 MG/DL (ref 8–22)
CALCIUM SERPL-MCNC: 9.2 MG/DL (ref 8.5–10.5)
CHLORIDE SERPL-SCNC: 106 MMOL/L (ref 96–112)
CHOLEST SERPL-MCNC: 176 MG/DL (ref 100–199)
CO2 SERPL-SCNC: 25 MMOL/L (ref 20–33)
CREAT SERPL-MCNC: 1.07 MG/DL (ref 0.5–1.4)
EOSINOPHIL # BLD AUTO: 0.18 K/UL (ref 0–0.51)
EOSINOPHIL NFR BLD: 2.8 % (ref 0–6.9)
ERYTHROCYTE [DISTWIDTH] IN BLOOD BY AUTOMATED COUNT: 45.2 FL (ref 35.9–50)
FASTING STATUS PATIENT QL REPORTED: NORMAL
GLOBULIN SER CALC-MCNC: 3.5 G/DL (ref 1.9–3.5)
GLUCOSE SERPL-MCNC: 97 MG/DL (ref 65–99)
HCT VFR BLD AUTO: 55.4 % (ref 42–52)
HDLC SERPL-MCNC: 54 MG/DL
HGB BLD-MCNC: 18.5 G/DL (ref 14–18)
IMM GRANULOCYTES # BLD AUTO: 0.01 K/UL (ref 0–0.11)
IMM GRANULOCYTES NFR BLD AUTO: 0.2 % (ref 0–0.9)
IRON SERPL-MCNC: 68 UG/DL (ref 50–180)
LDLC SERPL CALC-MCNC: 113 MG/DL
LYMPHOCYTES # BLD AUTO: 1.77 K/UL (ref 1–4.8)
LYMPHOCYTES NFR BLD: 27.6 % (ref 22–41)
MCH RBC QN AUTO: 30.8 PG (ref 27–33)
MCHC RBC AUTO-ENTMCNC: 33.4 G/DL (ref 33.7–35.3)
MCV RBC AUTO: 92.2 FL (ref 81.4–97.8)
MONOCYTES # BLD AUTO: 0.57 K/UL (ref 0–0.85)
MONOCYTES NFR BLD AUTO: 8.9 % (ref 0–13.4)
NEUTROPHILS # BLD AUTO: 3.82 K/UL (ref 1.82–7.42)
NEUTROPHILS NFR BLD: 59.6 % (ref 44–72)
NRBC # BLD AUTO: 0 K/UL
NRBC BLD-RTO: 0 /100 WBC
PLATELET # BLD AUTO: 190 K/UL (ref 164–446)
PMV BLD AUTO: 9.9 FL (ref 9–12.9)
POTASSIUM SERPL-SCNC: 3.9 MMOL/L (ref 3.6–5.5)
PROT SERPL-MCNC: 7.5 G/DL (ref 6–8.2)
PSA SERPL-MCNC: 0.84 NG/ML (ref 0–4)
RBC # BLD AUTO: 6.01 M/UL (ref 4.7–6.1)
SODIUM SERPL-SCNC: 138 MMOL/L (ref 135–145)
TRIGL SERPL-MCNC: 47 MG/DL (ref 0–149)
TSH SERPL DL<=0.005 MIU/L-ACNC: 1.84 UIU/ML (ref 0.38–5.33)
WBC # BLD AUTO: 6.4 K/UL (ref 4.8–10.8)

## 2018-09-27 PROCEDURE — 83880 ASSAY OF NATRIURETIC PEPTIDE: CPT

## 2018-09-27 PROCEDURE — 80053 COMPREHEN METABOLIC PANEL: CPT

## 2018-09-27 PROCEDURE — 84443 ASSAY THYROID STIM HORMONE: CPT

## 2018-09-27 PROCEDURE — 36415 COLL VENOUS BLD VENIPUNCTURE: CPT

## 2018-09-27 PROCEDURE — 84153 ASSAY OF PSA TOTAL: CPT

## 2018-09-27 PROCEDURE — 80061 LIPID PANEL: CPT

## 2018-09-27 PROCEDURE — 85025 COMPLETE CBC W/AUTO DIFF WBC: CPT

## 2018-09-27 PROCEDURE — 83036 HEMOGLOBIN GLYCOSYLATED A1C: CPT

## 2018-09-27 PROCEDURE — 83540 ASSAY OF IRON: CPT

## 2018-09-28 LAB
EST. AVERAGE GLUCOSE BLD GHB EST-MCNC: 111 MG/DL
HBA1C MFR BLD: 5.5 % (ref 0–5.6)

## 2019-02-19 ENCOUNTER — HOSPITAL ENCOUNTER (OUTPATIENT)
Dept: LAB | Facility: MEDICAL CENTER | Age: 54
End: 2019-02-19
Attending: EMERGENCY MEDICINE
Payer: COMMERCIAL

## 2019-02-19 LAB
ALBUMIN SERPL BCP-MCNC: 4.1 G/DL (ref 3.2–4.9)
ALBUMIN/GLOB SERPL: 1.4 G/DL
ALP SERPL-CCNC: 38 U/L (ref 30–99)
ALT SERPL-CCNC: 20 U/L (ref 2–50)
ANION GAP SERPL CALC-SCNC: 15 MMOL/L (ref 0–11.9)
AST SERPL-CCNC: 25 U/L (ref 12–45)
BASOPHILS # BLD AUTO: 1.1 % (ref 0–1.8)
BASOPHILS # BLD: 0.07 K/UL (ref 0–0.12)
BILIRUB SERPL-MCNC: 1 MG/DL (ref 0.1–1.5)
BUN SERPL-MCNC: 19 MG/DL (ref 8–22)
CALCIUM SERPL-MCNC: 9 MG/DL (ref 8.5–10.5)
CHLORIDE SERPL-SCNC: 105 MMOL/L (ref 96–112)
CHOLEST SERPL-MCNC: 169 MG/DL (ref 100–199)
CO2 SERPL-SCNC: 21 MMOL/L (ref 20–33)
CREAT SERPL-MCNC: 0.99 MG/DL (ref 0.5–1.4)
EOSINOPHIL # BLD AUTO: 0.26 K/UL (ref 0–0.51)
EOSINOPHIL NFR BLD: 4.1 % (ref 0–6.9)
ERYTHROCYTE [DISTWIDTH] IN BLOOD BY AUTOMATED COUNT: 46.8 FL (ref 35.9–50)
ESTRADIOL SERPL-MCNC: <20 PG/ML
GGT SERPL-CCNC: 34 U/L (ref 7–51)
GLOBULIN SER CALC-MCNC: 2.9 G/DL (ref 1.9–3.5)
GLUCOSE SERPL-MCNC: 103 MG/DL (ref 65–99)
HCT VFR BLD AUTO: 56.4 % (ref 42–52)
HDLC SERPL-MCNC: 57 MG/DL
HGB BLD-MCNC: 19.2 G/DL (ref 14–18)
IMM GRANULOCYTES # BLD AUTO: 0.02 K/UL (ref 0–0.11)
IMM GRANULOCYTES NFR BLD AUTO: 0.3 % (ref 0–0.9)
LDH SERPL L TO P-CCNC: 189 U/L (ref 107–266)
LDLC SERPL CALC-MCNC: 100 MG/DL
LYMPHOCYTES # BLD AUTO: 1.79 K/UL (ref 1–4.8)
LYMPHOCYTES NFR BLD: 27.9 % (ref 22–41)
MCH RBC QN AUTO: 32.1 PG (ref 27–33)
MCHC RBC AUTO-ENTMCNC: 34 G/DL (ref 33.7–35.3)
MCV RBC AUTO: 94.3 FL (ref 81.4–97.8)
MONOCYTES # BLD AUTO: 0.57 K/UL (ref 0–0.85)
MONOCYTES NFR BLD AUTO: 8.9 % (ref 0–13.4)
NEUTROPHILS # BLD AUTO: 3.7 K/UL (ref 1.82–7.42)
NEUTROPHILS NFR BLD: 57.7 % (ref 44–72)
NRBC # BLD AUTO: 0 K/UL
NRBC BLD-RTO: 0 /100 WBC
PHOSPHATE SERPL-MCNC: 3.2 MG/DL (ref 2.5–4.5)
PLATELET # BLD AUTO: 177 K/UL (ref 164–446)
PMV BLD AUTO: 10.1 FL (ref 9–12.9)
POTASSIUM SERPL-SCNC: 4 MMOL/L (ref 3.6–5.5)
PROT SERPL-MCNC: 7 G/DL (ref 6–8.2)
PSA SERPL-MCNC: 0.62 NG/ML (ref 0–4)
RBC # BLD AUTO: 5.98 M/UL (ref 4.7–6.1)
SODIUM SERPL-SCNC: 141 MMOL/L (ref 135–145)
TRIGL SERPL-MCNC: 58 MG/DL (ref 0–149)
URATE SERPL-MCNC: 8 MG/DL (ref 2.5–8.3)
WBC # BLD AUTO: 6.4 K/UL (ref 4.8–10.8)

## 2019-02-19 PROCEDURE — 82977 ASSAY OF GGT: CPT

## 2019-02-19 PROCEDURE — 84403 ASSAY OF TOTAL TESTOSTERONE: CPT

## 2019-02-19 PROCEDURE — 80053 COMPREHEN METABOLIC PANEL: CPT

## 2019-02-19 PROCEDURE — 84270 ASSAY OF SEX HORMONE GLOBUL: CPT

## 2019-02-19 PROCEDURE — 84550 ASSAY OF BLOOD/URIC ACID: CPT

## 2019-02-19 PROCEDURE — 84153 ASSAY OF PSA TOTAL: CPT

## 2019-02-19 PROCEDURE — 36415 COLL VENOUS BLD VENIPUNCTURE: CPT

## 2019-02-19 PROCEDURE — 83615 LACTATE (LD) (LDH) ENZYME: CPT

## 2019-02-19 PROCEDURE — 82670 ASSAY OF TOTAL ESTRADIOL: CPT

## 2019-02-19 PROCEDURE — 84305 ASSAY OF SOMATOMEDIN: CPT

## 2019-02-19 PROCEDURE — 85025 COMPLETE CBC W/AUTO DIFF WBC: CPT

## 2019-02-19 PROCEDURE — 80061 LIPID PANEL: CPT

## 2019-02-19 PROCEDURE — 84100 ASSAY OF PHOSPHORUS: CPT

## 2019-02-21 LAB
IGF-I SERPL-MCNC: 130 NG/ML (ref 62–214)
IGF-I Z-SCORE SERPL: 0.1
SHBG SERPL-SCNC: 32 NMOL/L (ref 11–80)
TESTOST FREE MFR SERPL: 1.8 % (ref 1.6–2.9)
TESTOST FREE SERPL-MCNC: 37 PG/ML (ref 47–244)
TESTOST SERPL-MCNC: 209 NG/DL (ref 300–890)

## 2019-12-05 ENCOUNTER — HOSPITAL ENCOUNTER (OUTPATIENT)
Dept: LAB | Facility: MEDICAL CENTER | Age: 54
End: 2019-12-05
Attending: FAMILY MEDICINE
Payer: COMMERCIAL

## 2019-12-05 LAB
ALBUMIN SERPL BCP-MCNC: 4.1 G/DL (ref 3.2–4.9)
ALBUMIN/GLOB SERPL: 1.2 G/DL
ALP SERPL-CCNC: 40 U/L (ref 30–99)
ALT SERPL-CCNC: 30 U/L (ref 2–50)
ANION GAP SERPL CALC-SCNC: 9 MMOL/L (ref 0–11.9)
AST SERPL-CCNC: 24 U/L (ref 12–45)
BASOPHILS # BLD AUTO: 0.9 % (ref 0–1.8)
BASOPHILS # BLD: 0.05 K/UL (ref 0–0.12)
BILIRUB SERPL-MCNC: 0.8 MG/DL (ref 0.1–1.5)
BUN SERPL-MCNC: 16 MG/DL (ref 8–22)
CALCIUM SERPL-MCNC: 9.2 MG/DL (ref 8.5–10.5)
CHLORIDE SERPL-SCNC: 106 MMOL/L (ref 96–112)
CHOLEST SERPL-MCNC: 156 MG/DL (ref 100–199)
CO2 SERPL-SCNC: 24 MMOL/L (ref 20–33)
CREAT SERPL-MCNC: 1 MG/DL (ref 0.5–1.4)
EOSINOPHIL # BLD AUTO: 0.2 K/UL (ref 0–0.51)
EOSINOPHIL NFR BLD: 3.7 % (ref 0–6.9)
ERYTHROCYTE [DISTWIDTH] IN BLOOD BY AUTOMATED COUNT: 48.8 FL (ref 35.9–50)
EST. AVERAGE GLUCOSE BLD GHB EST-MCNC: 117 MG/DL
ESTRADIOL SERPL-MCNC: 94 PG/ML
GLOBULIN SER CALC-MCNC: 3.3 G/DL (ref 1.9–3.5)
GLUCOSE SERPL-MCNC: 95 MG/DL (ref 65–99)
HBA1C MFR BLD: 5.7 % (ref 0–5.6)
HCT VFR BLD AUTO: 56.7 % (ref 42–52)
HDLC SERPL-MCNC: 45 MG/DL
HGB BLD-MCNC: 19 G/DL (ref 14–18)
IMM GRANULOCYTES # BLD AUTO: 0.01 K/UL (ref 0–0.11)
IMM GRANULOCYTES NFR BLD AUTO: 0.2 % (ref 0–0.9)
LDLC SERPL CALC-MCNC: 98 MG/DL
LYMPHOCYTES # BLD AUTO: 1.46 K/UL (ref 1–4.8)
LYMPHOCYTES NFR BLD: 26.9 % (ref 22–41)
MCH RBC QN AUTO: 28.3 PG (ref 27–33)
MCHC RBC AUTO-ENTMCNC: 33.5 G/DL (ref 33.7–35.3)
MCV RBC AUTO: 84.5 FL (ref 81.4–97.8)
MONOCYTES # BLD AUTO: 0.55 K/UL (ref 0–0.85)
MONOCYTES NFR BLD AUTO: 10.1 % (ref 0–13.4)
NEUTROPHILS # BLD AUTO: 3.15 K/UL (ref 1.82–7.42)
NEUTROPHILS NFR BLD: 58.2 % (ref 44–72)
NRBC # BLD AUTO: 0 K/UL
NRBC BLD-RTO: 0 /100 WBC
NT-PROBNP SERPL IA-MCNC: 14 PG/ML (ref 0–125)
PLATELET # BLD AUTO: 165 K/UL (ref 164–446)
PMV BLD AUTO: 10 FL (ref 9–12.9)
POTASSIUM SERPL-SCNC: 4 MMOL/L (ref 3.6–5.5)
PROT SERPL-MCNC: 7.4 G/DL (ref 6–8.2)
PSA SERPL-MCNC: 0.55 NG/ML (ref 0–4)
RBC # BLD AUTO: 6.71 M/UL (ref 4.7–6.1)
SODIUM SERPL-SCNC: 139 MMOL/L (ref 135–145)
T4 FREE SERPL-MCNC: 0.88 NG/DL (ref 0.53–1.43)
TRIGL SERPL-MCNC: 65 MG/DL (ref 0–149)
TSH SERPL DL<=0.005 MIU/L-ACNC: 1.63 UIU/ML (ref 0.38–5.33)
WBC # BLD AUTO: 5.4 K/UL (ref 4.8–10.8)

## 2019-12-05 PROCEDURE — 84443 ASSAY THYROID STIM HORMONE: CPT

## 2019-12-05 PROCEDURE — 83880 ASSAY OF NATRIURETIC PEPTIDE: CPT

## 2019-12-05 PROCEDURE — 80061 LIPID PANEL: CPT

## 2019-12-05 PROCEDURE — 82670 ASSAY OF TOTAL ESTRADIOL: CPT

## 2019-12-05 PROCEDURE — 84439 ASSAY OF FREE THYROXINE: CPT

## 2019-12-05 PROCEDURE — 84153 ASSAY OF PSA TOTAL: CPT

## 2019-12-05 PROCEDURE — 83036 HEMOGLOBIN GLYCOSYLATED A1C: CPT

## 2019-12-05 PROCEDURE — 84402 ASSAY OF FREE TESTOSTERONE: CPT

## 2019-12-05 PROCEDURE — 80053 COMPREHEN METABOLIC PANEL: CPT

## 2019-12-05 PROCEDURE — 36415 COLL VENOUS BLD VENIPUNCTURE: CPT

## 2019-12-05 PROCEDURE — 85025 COMPLETE CBC W/AUTO DIFF WBC: CPT

## 2019-12-05 PROCEDURE — 84270 ASSAY OF SEX HORMONE GLOBUL: CPT

## 2019-12-05 PROCEDURE — 84403 ASSAY OF TOTAL TESTOSTERONE: CPT

## 2019-12-06 LAB
SHBG SERPL-SCNC: 23 NMOL/L (ref 11–80)
TESTOST FREE MFR SERPL: 2.5 % (ref 1.6–2.9)
TESTOST FREE SERPL-MCNC: 232 PG/ML (ref 47–244)
TESTOST SERPL-MCNC: 943 NG/DL (ref 300–890)

## 2019-12-09 ENCOUNTER — APPOINTMENT (OUTPATIENT)
Dept: RADIOLOGY | Facility: MEDICAL CENTER | Age: 54
End: 2019-12-09
Attending: EMERGENCY MEDICINE
Payer: COMMERCIAL

## 2019-12-09 ENCOUNTER — HOSPITAL ENCOUNTER (EMERGENCY)
Facility: MEDICAL CENTER | Age: 54
End: 2019-12-09
Attending: EMERGENCY MEDICINE
Payer: COMMERCIAL

## 2019-12-09 VITALS
HEART RATE: 90 BPM | OXYGEN SATURATION: 91 % | DIASTOLIC BLOOD PRESSURE: 88 MMHG | BODY MASS INDEX: 33.99 KG/M2 | TEMPERATURE: 98.5 F | SYSTOLIC BLOOD PRESSURE: 175 MMHG | HEIGHT: 75 IN | WEIGHT: 273.37 LBS | RESPIRATION RATE: 18 BRPM

## 2019-12-09 DIAGNOSIS — R10.11 RIGHT UPPER QUADRANT ABDOMINAL PAIN: ICD-10-CM

## 2019-12-09 LAB
ALBUMIN SERPL BCP-MCNC: 4.2 G/DL (ref 3.2–4.9)
ALBUMIN/GLOB SERPL: 1.2 G/DL
ALP SERPL-CCNC: 52 U/L (ref 30–99)
ALT SERPL-CCNC: 34 U/L (ref 2–50)
ANION GAP SERPL CALC-SCNC: 20 MMOL/L (ref 0–11.9)
AST SERPL-CCNC: 33 U/L (ref 12–45)
BASOPHILS # BLD AUTO: 1.1 % (ref 0–1.8)
BASOPHILS # BLD: 0.08 K/UL (ref 0–0.12)
BILIRUB SERPL-MCNC: 0.5 MG/DL (ref 0.1–1.5)
BUN SERPL-MCNC: 13 MG/DL (ref 8–22)
CALCIUM SERPL-MCNC: 9.1 MG/DL (ref 8.4–10.2)
CHLORIDE SERPL-SCNC: 101 MMOL/L (ref 96–112)
CO2 SERPL-SCNC: 21 MMOL/L (ref 20–33)
CREAT SERPL-MCNC: 0.92 MG/DL (ref 0.5–1.4)
D DIMER PPP IA.FEU-MCNC: 0.66 UG/ML (FEU) (ref 0–0.5)
EOSINOPHIL # BLD AUTO: 0.06 K/UL (ref 0–0.51)
EOSINOPHIL NFR BLD: 0.8 % (ref 0–6.9)
ERYTHROCYTE [DISTWIDTH] IN BLOOD BY AUTOMATED COUNT: 47.8 FL (ref 35.9–50)
GLOBULIN SER CALC-MCNC: 3.4 G/DL (ref 1.9–3.5)
GLUCOSE SERPL-MCNC: 96 MG/DL (ref 65–99)
HCT VFR BLD AUTO: 55.4 % (ref 42–52)
HGB BLD-MCNC: 18.6 G/DL (ref 14–18)
IMM GRANULOCYTES # BLD AUTO: 0.02 K/UL (ref 0–0.11)
IMM GRANULOCYTES NFR BLD AUTO: 0.3 % (ref 0–0.9)
LIPASE SERPL-CCNC: 14 U/L (ref 7–58)
LYMPHOCYTES # BLD AUTO: 2.06 K/UL (ref 1–4.8)
LYMPHOCYTES NFR BLD: 27.1 % (ref 22–41)
MCH RBC QN AUTO: 27.6 PG (ref 27–33)
MCHC RBC AUTO-ENTMCNC: 33.6 G/DL (ref 33.7–35.3)
MCV RBC AUTO: 82.1 FL (ref 81.4–97.8)
MONOCYTES # BLD AUTO: 0.5 K/UL (ref 0–0.85)
MONOCYTES NFR BLD AUTO: 6.6 % (ref 0–13.4)
NEUTROPHILS # BLD AUTO: 4.89 K/UL (ref 1.82–7.42)
NEUTROPHILS NFR BLD: 64.1 % (ref 44–72)
NRBC # BLD AUTO: 0 K/UL
NRBC BLD-RTO: 0 /100 WBC
PLATELET # BLD AUTO: 212 K/UL (ref 164–446)
PMV BLD AUTO: 9.2 FL (ref 9–12.9)
POTASSIUM SERPL-SCNC: 3.8 MMOL/L (ref 3.6–5.5)
PROT SERPL-MCNC: 7.6 G/DL (ref 6–8.2)
RBC # BLD AUTO: 6.75 M/UL (ref 4.7–6.1)
SODIUM SERPL-SCNC: 142 MMOL/L (ref 135–145)
TROPONIN T SERPL-MCNC: 10 NG/L (ref 6–19)
WBC # BLD AUTO: 7.6 K/UL (ref 4.8–10.8)

## 2019-12-09 PROCEDURE — 85379 FIBRIN DEGRADATION QUANT: CPT

## 2019-12-09 PROCEDURE — 99285 EMERGENCY DEPT VISIT HI MDM: CPT

## 2019-12-09 PROCEDURE — 700117 HCHG RX CONTRAST REV CODE 255: Performed by: EMERGENCY MEDICINE

## 2019-12-09 PROCEDURE — 84484 ASSAY OF TROPONIN QUANT: CPT

## 2019-12-09 PROCEDURE — 71045 X-RAY EXAM CHEST 1 VIEW: CPT

## 2019-12-09 PROCEDURE — 80053 COMPREHEN METABOLIC PANEL: CPT

## 2019-12-09 PROCEDURE — 36415 COLL VENOUS BLD VENIPUNCTURE: CPT

## 2019-12-09 PROCEDURE — 71275 CT ANGIOGRAPHY CHEST: CPT

## 2019-12-09 PROCEDURE — 83690 ASSAY OF LIPASE: CPT

## 2019-12-09 PROCEDURE — 85025 COMPLETE CBC W/AUTO DIFF WBC: CPT

## 2019-12-09 PROCEDURE — 93005 ELECTROCARDIOGRAM TRACING: CPT | Performed by: EMERGENCY MEDICINE

## 2019-12-09 RX ORDER — OMEPRAZOLE 20 MG/1
20 CAPSULE, DELAYED RELEASE ORAL
COMMUNITY

## 2019-12-09 RX ORDER — AZITHROMYCIN 250 MG/1
TABLET, FILM COATED ORAL
Qty: 6 TAB | Refills: 0 | Status: SHIPPED | OUTPATIENT
Start: 2019-12-09 | End: 2022-09-19

## 2019-12-09 RX ADMIN — IOHEXOL 75 ML: 350 INJECTION, SOLUTION INTRAVENOUS at 13:18

## 2019-12-09 ASSESSMENT — PAIN SCALES - WONG BAKER: WONGBAKER_NUMERICALRESPONSE: HURTS EVEN MORE

## 2019-12-09 NOTE — ED TRIAGE NOTES
"Chief Complaint   Patient presents with   • RUQ Pain     started a week ago. pt denies n/v/d. denies trauma to area. pt drove from [facility] to [place] last week. pain started 2 days after.      BP (!) 166/113   Pulse 100   Temp 36.9 °C (98.5 °F) (Temporal)   Resp 20   Ht 1.905 m (6' 3\")   Wt 124 kg (273 lb 5.9 oz)   SpO2 95%   BMI 34.17 kg/m²     "

## 2019-12-09 NOTE — DISCHARGE INSTRUCTIONS
Follow-up with your primary doctor.  Return for new or concerning symptoms to include trouble breathing chest pain or other concerns.

## 2019-12-09 NOTE — ED NOTES
ERP at bedside. Pt agrees with plan of care discussed by ERP. AIDET acknowledged with patient. Twila in low position, side rail up for pt safety. Call light within reach. Will continue to monitor.

## 2019-12-09 NOTE — ED PROVIDER NOTES
CHIEF COMPLAINT  Chief Complaint   Patient presents with   • RUQ Pain     started a week ago. pt denies n/v/d. denies trauma to area. pt drove from [facility] to [place] last week. pain started 2 days after.        HPI  Milton Stewart is a 54 y.o. male who presents with a sensation of discomfort in his right upper quadrant and lower ribs on the right.  He is noticed it for about 7 days.  He states it is worse when he moves or turns but not necessarily when he breathes.  He denies any shortness of breath or chest pain.  He has had a prior cholecystectomy distantly.  He does note that he had some recent heavy alcohol use.  He otherwise notes no history of DVT or PE but he has been traveling in a car across the country for the last 5 days.  He notes no significant leg swelling.  No history of cardiac disease.  He has not coughed up any blood and has no history of cancer or recent surgery.    REVIEW OF SYSTEMS  All other systems are negative.     PAST MEDICAL HISTORY  Past Medical History:   Diagnosis Date   • ASTHMA    • ETOH abuse    • Heart burn    • Hypertension     pt states it is resolved after weight loss   • Sleep apnea    • Snoring        FAMILY HISTORY  Family History   Problem Relation Age of Onset   • Heart Disease Mother    • Diabetes Mother    • Kidney Disease Mother         on dialysis   • Heart Disease Father    • Other Father         mechanical injuries   • Stroke Brother    • Heart Disease Brother        SOCIAL HISTORY  Social History     Socioeconomic History   • Marital status:      Spouse name: Not on file   • Number of children: Not on file   • Years of education: Not on file   • Highest education level: Not on file   Occupational History   • Not on file   Social Needs   • Financial resource strain: Not on file   • Food insecurity:     Worry: Not on file     Inability: Not on file   • Transportation needs:     Medical: Not on file     Non-medical: Not on file   Tobacco Use   • Smoking  "status: Never Smoker   • Smokeless tobacco: Never Used   Substance and Sexual Activity   • Alcohol use: Yes     Alcohol/week: 15.0 oz     Types: 25 Shots of liquor per week     Comment: binge drinker   • Drug use: Yes     Types: Cocaine     Comment: cocaine recreationally-12/6/2019   • Sexual activity: Not on file   Lifestyle   • Physical activity:     Days per week: Not on file     Minutes per session: Not on file   • Stress: Not on file   Relationships   • Social connections:     Talks on phone: Not on file     Gets together: Not on file     Attends [employer] service: Not on file     Active member of club or organization: Not on file     Attends meetings of clubs or organizations: Not on file     Relationship status: Not on file   • Intimate partner violence:     Fear of current or ex partner: Not on file     Emotionally abused: Not on file     Physically abused: Not on file     Forced sexual activity: Not on file   Other Topics Concern   • Not on file   Social History Narrative   • Not on file       SURGICAL HISTORY  Past Surgical History:   Procedure Laterality Date   • BICEPS TENDON REPAIR Right 6/22/2018    Procedure: BICEPS TENDON REPAIR - DISTAL;  Surgeon: Ebenezer Mejia M.D.;  Location: SURGERY [facility];  Service: Orthopedics   • BHARATHI BY LAPAROSCOPY  2005   • TONSILLECTOMY  1974       CURRENT MEDICATIONS  Home Medications     Reviewed by Liana Goff (Pharmacy Tech) on 12/09/19 at 1145  Med List Status: Complete   Medication Last Dose Status   omeprazole (PRILOSEC) 20 MG delayed-release capsule 12/8/2019 Active                ALLERGIES  No Known Allergies    PHYSICAL EXAM  VITAL SIGNS: /96   Pulse 96   Temp 36.9 °C (98.5 °F) (Temporal)   Resp 20   Ht 1.905 m (6' 3\")   Wt 124 kg (273 lb 5.9 oz)   SpO2 94%   BMI 34.17 kg/m²      Constitutional: Well developed, Well nourished, No acute distress, Non-toxic appearance.   HENT: Normocephalic, Atraumatic, TMs normal, mucous membranes " moist, no erythema, exudates, swelling, or masses, nares patent  Eyes: nonicteric  Neck: Supple, no meningismus  Lymphatic: No lymphadenopathy noted.   Cardiovascular: Regular rate and rhythm, no gallops rubs or murmurs  Lungs: Clear bilaterally   Abdomen: Bowel sounds normal, Soft, No tenderness, No pulsatile masses.   Skin: Warm, Dry, no rash  Back: No tenderness, No CVA tenderness.   Genitalia: Deferred  Rectal: Deferred  Extremities: Trace edema bilateral lower extremities  Neurologic: Alert, appropriate, follows commands, moving all extremities, normal speech   Psychiatric: Affect normal    EKG  Time is 1202, rate 88, sinus rhythm, intervals normal, axis leftward-left anterior fascicular block, no ST segment elevation or depression, no T wave inversions, no ectopy    RADIOLOGY/PROCEDURES  CT-CTA CHEST PULMONARY ARTERY W/ RECONS   Final Result         1.  No evidence of pulmonary embolism.   2.  Mild dependent opacities bilaterally, right greater than left which are nonspecific and could represent atelectasis or mild pneumonitis.   3.  Prior cholecystectomy.            DX-CHEST-PORTABLE (1 VIEW)   Final Result      Negative single view of the chest.          Results for orders placed or performed during the hospital encounter of 12/09/19   CBC WITH DIFFERENTIAL   Result Value Ref Range    WBC 7.6 4.8 - 10.8 K/uL    RBC 6.75 (H) 4.70 - 6.10 M/uL    Hemoglobin 18.6 (H) 14.0 - 18.0 g/dL    Hematocrit 55.4 (H) 42.0 - 52.0 %    MCV 82.1 81.4 - 97.8 fL    MCH 27.6 27.0 - 33.0 pg    MCHC 33.6 (L) 33.7 - 35.3 g/dL    RDW 47.8 35.9 - 50.0 fL    Platelet Count 212 164 - 446 K/uL    MPV 9.2 9.0 - 12.9 fL    Neutrophils-Polys 64.10 44.00 - 72.00 %    Lymphocytes 27.10 22.00 - 41.00 %    Monocytes 6.60 0.00 - 13.40 %    Eosinophils 0.80 0.00 - 6.90 %    Basophils 1.10 0.00 - 1.80 %    Immature Granulocytes 0.30 0.00 - 0.90 %    Nucleated RBC 0.00 /100 WBC    Neutrophils (Absolute) 4.89 1.82 - 7.42 K/uL    Lymphs (Absolute)  2.06 1.00 - 4.80 K/uL    Monos (Absolute) 0.50 0.00 - 0.85 K/uL    Eos (Absolute) 0.06 0.00 - 0.51 K/uL    Baso (Absolute) 0.08 0.00 - 0.12 K/uL    Immature Granulocytes (abs) 0.02 0.00 - 0.11 K/uL    NRBC (Absolute) 0.00 K/uL   COMP METABOLIC PANEL   Result Value Ref Range    Sodium 142 135 - 145 mmol/L    Potassium 3.8 3.6 - 5.5 mmol/L    Chloride 101 96 - 112 mmol/L    Co2 21 20 - 33 mmol/L    Anion Gap 20.0 (H) 0.0 - 11.9    Glucose 96 65 - 99 mg/dL    Bun 13 8 - 22 mg/dL    Creatinine 0.92 0.50 - 1.40 mg/dL    Calcium 9.1 8.4 - 10.2 mg/dL    AST(SGOT) 33 12 - 45 U/L    ALT(SGPT) 34 2 - 50 U/L    Alkaline Phosphatase 52 30 - 99 U/L    Total Bilirubin 0.5 0.1 - 1.5 mg/dL    Albumin 4.2 3.2 - 4.9 g/dL    Total Protein 7.6 6.0 - 8.2 g/dL    Globulin 3.4 1.9 - 3.5 g/dL    A-G Ratio 1.2 g/dL   LIPASE   Result Value Ref Range    Lipase 14 7 - 58 U/L   TROPONIN   Result Value Ref Range    Troponin T 10 6 - 19 ng/L   D-DIMER   Result Value Ref Range    D-Dimer Screen 0.66 (H) 0.00 - 0.50 ug/mL (FEU)   ESTIMATED GFR   Result Value Ref Range    GFR If African American >60 >60 mL/min/1.73 m 2    GFR If Non African American >60 >60 mL/min/1.73 m 2   EKG (NOW)   Result Value Ref Range    Report       [facility] Emergency Dept[facility]    Test Date:  2019  Pt Name:    KATIE KRUEGER          Department: [facility]  MRN:        [id number]                      [facility]  Gender:     Male                         Technician: [id number]  :        1965                   Requested By:EMIR EASTMAN  Order #:    [id number]                    Reading MD:    Measurements  Intervals                                Axis  Rate:       88                           P:          58  NH:         180                          QRS:        -53  QRSD:       108                          T:          35  QT:         384  QTc:        465    Interpretive Statements  SINUS RHYTHM  LAD, CONSIDER LEFT ANTERIOR FASCICULAR  BLOCK  Compared to ECG 12/12/2016 10:34:33  No significant changes           COURSE & MEDICAL DECISION MAKING  Pertinent Labs & Imaging studies reviewed. (See chart for details)  This is a 54-year-old male who presents with right upper quadrant pain/costal margin pain over the last week or so.  He has been traveling cross[place]country which puts him at somewhat increased risk of DVT.  Patient had a work-up here today with positive d-dimer and subsequent CT chest demonstrates no PE but does demonstrate atelectasis versus early pneumonitis right greater than left.  Given that the right is side of his complaint I will cover him with an antibiotic for pneumonia.  But otherwise he is not hypoxic, white count is normal and he is afebrile.  Troponin is negative and EKG is reassuring.  He is already had his gallbladder removed and on exam he has no significant abdominal pain to palpation.  He has no significant transaminitis or evidence of pancreatitis.  Patient will follow with his primary doctor.    FINAL IMPRESSION  1.  Rib pain  2.   3.         Electronically signed by: Bassem Tavera, 12/9/2019 11:48 AM

## 2019-12-11 LAB — EKG IMPRESSION: NORMAL

## 2020-11-16 ENCOUNTER — NURSE TRIAGE (OUTPATIENT)
Dept: HEALTH INFORMATION MANAGEMENT | Facility: OTHER | Age: 55
End: 2020-11-16

## 2020-11-16 ENCOUNTER — HOSPITAL ENCOUNTER (EMERGENCY)
Facility: MEDICAL CENTER | Age: 55
End: 2020-11-16
Attending: EMERGENCY MEDICINE
Payer: COMMERCIAL

## 2020-11-16 VITALS
BODY MASS INDEX: 36.35 KG/M2 | RESPIRATION RATE: 18 BRPM | DIASTOLIC BLOOD PRESSURE: 118 MMHG | WEIGHT: 292.33 LBS | HEIGHT: 75 IN | HEART RATE: 76 BPM | TEMPERATURE: 97.4 F | SYSTOLIC BLOOD PRESSURE: 154 MMHG | OXYGEN SATURATION: 94 %

## 2020-11-16 DIAGNOSIS — Z20.822 SUSPECTED 2019 NOVEL CORONAVIRUS INFECTION: ICD-10-CM

## 2020-11-16 DIAGNOSIS — R05.9 COUGH: ICD-10-CM

## 2020-11-16 LAB — COVID ORDER STATUS COVID19: NORMAL

## 2020-11-16 PROCEDURE — U0003 INFECTIOUS AGENT DETECTION BY NUCLEIC ACID (DNA OR RNA); SEVERE ACUTE RESPIRATORY SYNDROME CORONAVIRUS 2 (SARS-COV-2) (CORONAVIRUS DISEASE [COVID-19]), AMPLIFIED PROBE TECHNIQUE, MAKING USE OF HIGH THROUGHPUT TECHNOLOGIES AS DESCRIBED BY CMS-2020-01-R: HCPCS

## 2020-11-16 PROCEDURE — 99283 EMERGENCY DEPT VISIT LOW MDM: CPT

## 2020-11-16 ASSESSMENT — FIBROSIS 4 INDEX: FIB4 SCORE: 1.49

## 2020-11-17 LAB
SARS-COV-2 RNA RESP QL NAA+PROBE: DETECTED
SPECIMEN SOURCE: ABNORMAL

## 2020-11-17 NOTE — ED NOTES
Pt given d/c instructions, pt verbalized understanding all information given. Pt ambulated out of the ER w/o difficulty w/ spouse

## 2020-11-17 NOTE — TELEPHONE ENCOUNTER
1. Caller Name: Robyn Craven               Call Back Number: [phone]  [facility] PCP or Specialty Provider: No  Luke Rg MD        2.  Has the patient previously tested positive for COVID-19? No    3.  In the last two weeks, has the patient had any new or worsening symptoms (not explained by alternative diagnosis)? Yes, the patient reports the following COVID-19 consistent symptoms: cough, fever of at least 100.4°F (38°C) or greater, chills, congestion or runny nose, muscle pain or body aches, fatigue, headache, new loss of taste or smell and started two days ago..    4.  Does patient have any comoribidities? None asthma    5.  Has the patient had any known contact with someone who is suspected or confirmed to have COVID-19? Yes, wife has been sick but not tested    5. POTENTIAL PUI (MODERATE):  Directed to [facility]  Needs to be seen as breathing and asthma issues.    Note routed to [facility] Provider: FYI only.

## 2020-11-17 NOTE — ED PROVIDER NOTES
ED Provider Note    ER PROVIDER NOTE        CHIEF COMPLAINT  Chief Complaint   Patient presents with   • Coronavirus Screening     Reports headache, congestion. 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 Denies CP, SOB. T max 99 per pt.       HPI  Milton Stewart is a 55 y.o. male who presents to the emergency department complaining of cough and body aches.  Patient reports symptoms of the last 3 to 4 days, as well as low-grade temperature.  No shortness of breath or chest pain.  No nausea vomiting or diarrhea.  Sense of taste seems off and he does have some associated congestion as well.    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 Pertinent negatives include no chest pain. See HPI for details. All other systems reviewed and are negative.    PAST MEDICAL HISTORY   has a past medical history of ASTHMA, ETOH abuse, Heart burn, Hypertension, Sleep apnea, and Snoring.    SURGICAL HISTORY   has a past surgical history that includes woody by laparoscopy (2005); tonsillectomy (1974); and biceps tendon repair (Right, 6/22/2018).    FAMILY HISTORY  Family History   Problem Relation Age of Onset   • Heart Disease Mother    • Diabetes Mother    • Kidney Disease Mother         on dialysis   • Heart Disease Father    • Other Father         mechanical injuries   • Stroke Brother    • Heart Disease Brother        SOCIAL HISTORY  Social History     Socioeconomic History   • Marital status:      Spouse name: Not on file   • Number of children: Not on file   • Years of education: Not on file   • Highest education level: Not on file   Occupational History   • Not on file   Social Needs   • Financial resource strain: Not on file   • Food insecurity     Worry: Not on file     Inability: Not on file   • Transportation needs     Medical: Not on file     Non-medical: Not on file   Tobacco Use  "  • Smoking status: Never Smoker   • Smokeless tobacco: Never Used   Substance and Sexual Activity   • Alcohol use: Not Currently     Alcohol/week: 15.0 oz     Types: 25 Shots of liquor per week     Comment: binge drinker   • Drug use: Not Currently     Types: Cocaine     Comment: cocaine recreationally-12/6/2019   • Sexual activity: Not on file   Lifestyle   • Physical activity     Days per week: Not on file     Minutes per session: Not on file   • Stress: Not on file   Relationships   • Social connections     Talks on phone: Not on file     Gets together: Not on file     Attends Jewish service: Not on file     Active member of club or organization: Not on file     Attends meetings of clubs or organizations: Not on file     Relationship status: Not on file   • Intimate partner violence     Fear of current or ex partner: Not on file     Emotionally abused: Not on file     Physically abused: Not on file     Forced sexual activity: Not on file   Other Topics Concern   • Not on file   Social History Narrative   • Not on file      Social History     Substance and Sexual Activity   Drug Use Not Currently   • Types: Cocaine    Comment: cocaine recreationally-12/6/2019       CURRENT MEDICATIONS  Home Medications    **Home medications have not yet been reviewed for this encounter**         ALLERGIES  No Known Allergies    PHYSICAL EXAM  VITAL SIGNS: /118   Pulse 76   Temp 36.3 °C (97.4 °F) (Temporal)   Resp 18   Ht 1.905 m (6' 3\")   Wt (!) 132.6 kg (292 lb 5.3 oz)   SpO2 94%   BMI 36.54 kg/m²   Pulse ox interpretation: I interpret this pulse ox as normal.    Constitutional: Alert in no apparent distress.  HENT: No signs of trauma, Bilateral external ears normal, Nose normal.   Eyes: Pupils are equal and reactive, Conjunctiva normal, Non-icteric.   Neck: Normal range of motion, No tenderness, Supple, No stridor.   Lymphatic: No lymphadenopathy noted.   Cardiovascular: Regular rate and rhythm, no murmurs. "   Thorax & Lungs: Normal breath sounds, No respiratory distress, No wheezing, No chest tenderness.   Abdomen: Bowel sounds normal, Soft, No tenderness, No masses, No pulsatile masses. No peritoneal signs.  Skin: Warm, Dry, No erythema, No rash.   Back: No bony tenderness, No CVA tenderness.   Extremities: Intact distal pulses, No edema, No tenderness, No cyanosis, Negative Michael's sign.  Musculoskeletal: Good range of motion in all major joints. No tenderness to palpation or major deformities noted.   Neurologic: Alert , Normal motor function, Normal sensory function, No focal deficits noted.   Psychiatric: Affect normal, Judgment normal, Mood normal.     DIAGNOSTIC STUDIES / PROCEDURES    Labs Reviewed   COVID/SARS COV-2    Narrative:     Is patient being admitted?->No  Expected turn around time?->Routine (In-House PCR up to 24  hours)  Is this the patients First SARS CoV-2 test?->Yes  Is this patient employed in healthcare?->No  Is the patient symptomatic as defined by the CDC?->Yes  Date of symptom onset?->11/12/20  Is the patient hospitalized?->No  Is the patient a resident in a congregate care setting?->No  Is the patient pregnant?->No         RADIOLOGY  No orders to display     The radiologist's interpretation of all radiological studies have been reviewed and images independently viewed by me.    COURSE & MEDICAL DECISION MAKING  Nursing notes, VS, PMSFHx reviewed in chart.    6:35 PM Patient seen and examined at bedside.  Ordered for outpatient Covid to evaluate his symptoms.         Decision Making:  This is a 55 y.o. male presented with cough and congestion.  Given his exposures and symptoms certainly concerning for COVID-19.  Patient is overall well-appearing with appropriate vital signs, no hypoxia, tachycardia and increased work of breathing.  This point I feel is appropriate for continued outpatient management.  No focal pulmonary findings on exam to suggest pneumonia.  Patient will continue to  quarantine self isolate, and understands return precautions were discussed     The patient will return for new or worsening symptoms and is stable at the time of discharge.    The patient is referred to a primary physician for blood pressure management, diabetic screening, and for all other preventative health concerns.      DISPOSITION:  Patient will be discharged home in stable condition.    FOLLOW UP:  Tam Rg M.D.  [address] [facility] [address] [facility] [address]  [place] [address]  [phone]    In 1 week  As needed      OUTPATIENT MEDICATIONS:  New Prescriptions    No medications on file         FINAL IMPRESSION  1. Cough    2. Suspected 2019 novel coronavirus infection         The note accurately reflects work and decisions made by me.  Tacho Israel M.D.  11/16/2020  7:21 PM

## 2021-03-15 DIAGNOSIS — Z23 NEED FOR VACCINATION: ICD-10-CM

## 2021-04-17 ENCOUNTER — HOSPITAL ENCOUNTER (OUTPATIENT)
Dept: LAB | Facility: MEDICAL CENTER | Age: 56
End: 2021-04-17
Attending: FAMILY MEDICINE
Payer: COMMERCIAL

## 2021-04-17 LAB
COVID ORDER STATUS COVID19: NORMAL
SARS-COV-2 RNA RESP QL NAA+PROBE: NOTDETECTED
SPECIMEN SOURCE: NORMAL

## 2021-04-17 PROCEDURE — U0005 INFEC AGEN DETEC AMPLI PROBE: HCPCS

## 2021-04-17 PROCEDURE — C9803 HOPD COVID-19 SPEC COLLECT: HCPCS

## 2021-04-17 PROCEDURE — U0003 INFECTIOUS AGENT DETECTION BY NUCLEIC ACID (DNA OR RNA); SEVERE ACUTE RESPIRATORY SYNDROME CORONAVIRUS 2 (SARS-COV-2) (CORONAVIRUS DISEASE [COVID-19]), AMPLIFIED PROBE TECHNIQUE, MAKING USE OF HIGH THROUGHPUT TECHNOLOGIES AS DESCRIBED BY CMS-2020-01-R: HCPCS

## 2021-06-29 ENCOUNTER — HOSPITAL ENCOUNTER (OUTPATIENT)
Dept: LAB | Facility: MEDICAL CENTER | Age: 56
End: 2021-06-29
Attending: FAMILY MEDICINE
Payer: COMMERCIAL

## 2021-06-29 ENCOUNTER — HOSPITAL ENCOUNTER (OUTPATIENT)
Dept: LAB | Facility: MEDICAL CENTER | Age: 56
End: 2021-06-29
Attending: EMERGENCY MEDICINE
Payer: COMMERCIAL

## 2021-06-29 LAB
ALBUMIN SERPL BCP-MCNC: 4.1 G/DL (ref 3.2–4.9)
ALBUMIN SERPL BCP-MCNC: 4.1 G/DL (ref 3.2–4.9)
ALBUMIN/GLOB SERPL: 1.2 G/DL
ALBUMIN/GLOB SERPL: 1.4 G/DL
ALP SERPL-CCNC: 60 U/L (ref 30–99)
ALP SERPL-CCNC: 62 U/L (ref 30–99)
ALT SERPL-CCNC: 30 U/L (ref 2–50)
ALT SERPL-CCNC: 30 U/L (ref 2–50)
ANION GAP SERPL CALC-SCNC: 11 MMOL/L (ref 7–16)
ANION GAP SERPL CALC-SCNC: 11 MMOL/L (ref 7–16)
AST SERPL-CCNC: 29 U/L (ref 12–45)
AST SERPL-CCNC: 29 U/L (ref 12–45)
BASOPHILS # BLD AUTO: 1 % (ref 0–1.8)
BASOPHILS # BLD AUTO: 1 % (ref 0–1.8)
BASOPHILS # BLD: 0.06 K/UL (ref 0–0.12)
BASOPHILS # BLD: 0.06 K/UL (ref 0–0.12)
BILIRUB SERPL-MCNC: 0.9 MG/DL (ref 0.1–1.5)
BILIRUB SERPL-MCNC: 0.9 MG/DL (ref 0.1–1.5)
BUN SERPL-MCNC: 21 MG/DL (ref 8–22)
BUN SERPL-MCNC: 22 MG/DL (ref 8–22)
CALCIUM SERPL-MCNC: 7.6 MG/DL (ref 8.5–10.5)
CALCIUM SERPL-MCNC: 9.1 MG/DL (ref 8.5–10.5)
CHLORIDE SERPL-SCNC: 104 MMOL/L (ref 96–112)
CHLORIDE SERPL-SCNC: 104 MMOL/L (ref 96–112)
CHOLEST SERPL-MCNC: 191 MG/DL (ref 100–199)
CHOLEST SERPL-MCNC: 191 MG/DL (ref 100–199)
CO2 SERPL-SCNC: 24 MMOL/L (ref 20–33)
CO2 SERPL-SCNC: 24 MMOL/L (ref 20–33)
CREAT SERPL-MCNC: 0.85 MG/DL (ref 0.5–1.4)
CREAT SERPL-MCNC: 0.91 MG/DL (ref 0.5–1.4)
EOSINOPHIL # BLD AUTO: 0.2 K/UL (ref 0–0.51)
EOSINOPHIL # BLD AUTO: 0.21 K/UL (ref 0–0.51)
EOSINOPHIL NFR BLD: 3.4 % (ref 0–6.9)
EOSINOPHIL NFR BLD: 3.6 % (ref 0–6.9)
ERYTHROCYTE [DISTWIDTH] IN BLOOD BY AUTOMATED COUNT: 48.5 FL (ref 35.9–50)
ERYTHROCYTE [DISTWIDTH] IN BLOOD BY AUTOMATED COUNT: 48.8 FL (ref 35.9–50)
FASTING STATUS PATIENT QL REPORTED: NORMAL
FASTING STATUS PATIENT QL REPORTED: NORMAL
GGT SERPL-CCNC: 66 U/L (ref 7–51)
GLOBULIN SER CALC-MCNC: 3 G/DL (ref 1.9–3.5)
GLOBULIN SER CALC-MCNC: 3.3 G/DL (ref 1.9–3.5)
GLUCOSE SERPL-MCNC: 103 MG/DL (ref 65–99)
GLUCOSE SERPL-MCNC: 103 MG/DL (ref 65–99)
HCT VFR BLD AUTO: 49.1 % (ref 42–52)
HCT VFR BLD AUTO: 50.1 % (ref 42–52)
HDLC SERPL-MCNC: 47 MG/DL
HDLC SERPL-MCNC: 48 MG/DL
HGB BLD-MCNC: 17.3 G/DL (ref 14–18)
HGB BLD-MCNC: 17.4 G/DL (ref 14–18)
IMM GRANULOCYTES # BLD AUTO: 0.01 K/UL (ref 0–0.11)
IMM GRANULOCYTES # BLD AUTO: 0.02 K/UL (ref 0–0.11)
IMM GRANULOCYTES NFR BLD AUTO: 0.2 % (ref 0–0.9)
IMM GRANULOCYTES NFR BLD AUTO: 0.3 % (ref 0–0.9)
IRON SERPL-MCNC: 136 UG/DL (ref 50–180)
LDH SERPL L TO P-CCNC: 209 U/L (ref 107–266)
LDLC SERPL CALC-MCNC: 124 MG/DL
LDLC SERPL CALC-MCNC: 125 MG/DL
LYMPHOCYTES # BLD AUTO: 1.53 K/UL (ref 1–4.8)
LYMPHOCYTES # BLD AUTO: 1.56 K/UL (ref 1–4.8)
LYMPHOCYTES NFR BLD: 26.3 % (ref 22–41)
LYMPHOCYTES NFR BLD: 26.7 % (ref 22–41)
MCH RBC QN AUTO: 30.7 PG (ref 27–33)
MCH RBC QN AUTO: 31.1 PG (ref 27–33)
MCHC RBC AUTO-ENTMCNC: 34.5 G/DL (ref 33.7–35.3)
MCHC RBC AUTO-ENTMCNC: 35.4 G/DL (ref 33.7–35.3)
MCV RBC AUTO: 87.7 FL (ref 81.4–97.8)
MCV RBC AUTO: 89 FL (ref 81.4–97.8)
MONOCYTES # BLD AUTO: 0.58 K/UL (ref 0–0.85)
MONOCYTES # BLD AUTO: 0.58 K/UL (ref 0–0.85)
MONOCYTES NFR BLD AUTO: 10 % (ref 0–13.4)
MONOCYTES NFR BLD AUTO: 9.9 % (ref 0–13.4)
NEUTROPHILS # BLD AUTO: 3.42 K/UL (ref 1.82–7.42)
NEUTROPHILS # BLD AUTO: 3.44 K/UL (ref 1.82–7.42)
NEUTROPHILS NFR BLD: 58.5 % (ref 44–72)
NEUTROPHILS NFR BLD: 59.1 % (ref 44–72)
NRBC # BLD AUTO: 0 K/UL
NRBC # BLD AUTO: 0 K/UL
NRBC BLD-RTO: 0 /100 WBC
NRBC BLD-RTO: 0 /100 WBC
PHOSPHATE SERPL-MCNC: 2.5 MG/DL (ref 2.5–4.5)
PLATELET # BLD AUTO: 193 K/UL (ref 164–446)
PLATELET # BLD AUTO: 199 K/UL (ref 164–446)
PMV BLD AUTO: 9.7 FL (ref 9–12.9)
PMV BLD AUTO: 9.9 FL (ref 9–12.9)
POTASSIUM SERPL-SCNC: 3.6 MMOL/L (ref 3.6–5.5)
POTASSIUM SERPL-SCNC: 3.6 MMOL/L (ref 3.6–5.5)
PROT SERPL-MCNC: 7.1 G/DL (ref 6–8.2)
PROT SERPL-MCNC: 7.4 G/DL (ref 6–8.2)
PSA SERPL-MCNC: 0.53 NG/ML (ref 0–4)
RBC # BLD AUTO: 5.6 M/UL (ref 4.7–6.1)
RBC # BLD AUTO: 5.63 M/UL (ref 4.7–6.1)
SODIUM SERPL-SCNC: 139 MMOL/L (ref 135–145)
SODIUM SERPL-SCNC: 139 MMOL/L (ref 135–145)
TRIGL SERPL-MCNC: 96 MG/DL (ref 0–149)
TRIGL SERPL-MCNC: 97 MG/DL (ref 0–149)
TSH SERPL DL<=0.005 MIU/L-ACNC: 2.31 UIU/ML (ref 0.38–5.33)
URATE SERPL-MCNC: 10 MG/DL (ref 2.5–8.3)
WBC # BLD AUTO: 5.8 K/UL (ref 4.8–10.8)
WBC # BLD AUTO: 5.9 K/UL (ref 4.8–10.8)

## 2021-06-29 PROCEDURE — 82977 ASSAY OF GGT: CPT

## 2021-06-29 PROCEDURE — 83540 ASSAY OF IRON: CPT

## 2021-06-29 PROCEDURE — 83615 LACTATE (LD) (LDH) ENZYME: CPT

## 2021-06-29 PROCEDURE — 80053 COMPREHEN METABOLIC PANEL: CPT | Mod: 91

## 2021-06-29 PROCEDURE — 85025 COMPLETE CBC W/AUTO DIFF WBC: CPT

## 2021-06-29 PROCEDURE — 36415 COLL VENOUS BLD VENIPUNCTURE: CPT

## 2021-06-29 PROCEDURE — 82670 ASSAY OF TOTAL ESTRADIOL: CPT

## 2021-06-29 PROCEDURE — 80061 LIPID PANEL: CPT | Mod: 91

## 2021-06-29 PROCEDURE — 84100 ASSAY OF PHOSPHORUS: CPT

## 2021-06-29 PROCEDURE — 84403 ASSAY OF TOTAL TESTOSTERONE: CPT

## 2021-06-29 PROCEDURE — 84550 ASSAY OF BLOOD/URIC ACID: CPT

## 2021-06-29 PROCEDURE — 85025 COMPLETE CBC W/AUTO DIFF WBC: CPT | Mod: 91

## 2021-06-29 PROCEDURE — 84153 ASSAY OF PSA TOTAL: CPT

## 2021-06-29 PROCEDURE — 84305 ASSAY OF SOMATOMEDIN: CPT

## 2021-06-29 PROCEDURE — 80061 LIPID PANEL: CPT

## 2021-06-29 PROCEDURE — 80053 COMPREHEN METABOLIC PANEL: CPT

## 2021-06-29 PROCEDURE — 84443 ASSAY THYROID STIM HORMONE: CPT

## 2021-06-29 PROCEDURE — 84402 ASSAY OF FREE TESTOSTERONE: CPT

## 2021-06-29 PROCEDURE — 84270 ASSAY OF SEX HORMONE GLOBUL: CPT

## 2021-06-30 LAB — ESTRADIOL SERPL-MCNC: <20 PG/ML

## 2021-07-01 LAB
IGF-I SERPL-MCNC: 76 NG/ML (ref 59–206)
IGF-I Z-SCORE SERPL: -1.5
SHBG SERPL-SCNC: 20 NMOL/L (ref 11–80)
TESTOST FREE MFR SERPL: 2.2 % (ref 1.6–2.9)
TESTOST FREE SERPL-MCNC: 44 PG/ML (ref 47–244)
TESTOST SERPL-MCNC: 199 NG/DL (ref 300–890)

## 2022-09-19 ENCOUNTER — APPOINTMENT (OUTPATIENT)
Dept: RADIOLOGY | Facility: MEDICAL CENTER | Age: 57
DRG: 896 | End: 2022-09-19
Attending: EMERGENCY MEDICINE
Payer: COMMERCIAL

## 2022-09-19 ENCOUNTER — HOSPITAL ENCOUNTER (INPATIENT)
Facility: MEDICAL CENTER | Age: 57
LOS: 1 days | DRG: 896 | End: 2022-09-20
Attending: EMERGENCY MEDICINE | Admitting: INTERNAL MEDICINE
Payer: COMMERCIAL

## 2022-09-19 ENCOUNTER — APPOINTMENT (OUTPATIENT)
Dept: CARDIOLOGY | Facility: MEDICAL CENTER | Age: 57
DRG: 896 | End: 2022-09-19
Attending: INTERNAL MEDICINE
Payer: COMMERCIAL

## 2022-09-19 ENCOUNTER — APPOINTMENT (OUTPATIENT)
Dept: RADIOLOGY | Facility: MEDICAL CENTER | Age: 57
DRG: 896 | End: 2022-09-19
Attending: INTERNAL MEDICINE
Payer: COMMERCIAL

## 2022-09-19 DIAGNOSIS — I26.99 ACUTE PULMONARY EMBOLISM WITHOUT ACUTE COR PULMONALE, UNSPECIFIED PULMONARY EMBOLISM TYPE (HCC): ICD-10-CM

## 2022-09-19 DIAGNOSIS — F10.930 ALCOHOL WITHDRAWAL SYNDROME WITHOUT COMPLICATION (HCC): ICD-10-CM

## 2022-09-19 DIAGNOSIS — I16.1 HYPERTENSIVE EMERGENCY: ICD-10-CM

## 2022-09-19 DIAGNOSIS — R09.02 HYPOXIA: ICD-10-CM

## 2022-09-19 DIAGNOSIS — I10 PRIMARY HYPERTENSION: ICD-10-CM

## 2022-09-19 DIAGNOSIS — E87.6 HYPOKALEMIA: ICD-10-CM

## 2022-09-19 LAB
ALBUMIN SERPL BCP-MCNC: 4.1 G/DL (ref 3.2–4.9)
ALBUMIN/GLOB SERPL: 1.1 G/DL
ALP SERPL-CCNC: 81 U/L (ref 30–99)
ALT SERPL-CCNC: 19 U/L (ref 2–50)
ANION GAP SERPL CALC-SCNC: 16 MMOL/L (ref 7–16)
AST SERPL-CCNC: 28 U/L (ref 12–45)
BASOPHILS # BLD AUTO: 0.8 % (ref 0–1.8)
BASOPHILS # BLD: 0.05 K/UL (ref 0–0.12)
BILIRUB SERPL-MCNC: 1 MG/DL (ref 0.1–1.5)
BUN SERPL-MCNC: 9 MG/DL (ref 8–22)
CALCIUM SERPL-MCNC: 8.8 MG/DL (ref 8.4–10.2)
CHLORIDE SERPL-SCNC: 98 MMOL/L (ref 96–112)
CO2 SERPL-SCNC: 26 MMOL/L (ref 20–33)
CREAT SERPL-MCNC: 0.88 MG/DL (ref 0.5–1.4)
D DIMER PPP IA.FEU-MCNC: 1.1 UG/ML (FEU) (ref 0–0.5)
EKG IMPRESSION: NORMAL
EOSINOPHIL # BLD AUTO: 0.03 K/UL (ref 0–0.51)
EOSINOPHIL NFR BLD: 0.5 % (ref 0–6.9)
ERYTHROCYTE [DISTWIDTH] IN BLOOD BY AUTOMATED COUNT: 50.8 FL (ref 35.9–50)
ETHANOL BLD-MCNC: <10.1 MG/DL
FLUAV RNA SPEC QL NAA+PROBE: NEGATIVE
FLUBV RNA SPEC QL NAA+PROBE: NEGATIVE
GFR SERPLBLD CREATININE-BSD FMLA CKD-EPI: 100 ML/MIN/1.73 M 2
GLOBULIN SER CALC-MCNC: 3.7 G/DL (ref 1.9–3.5)
GLUCOSE SERPL-MCNC: 97 MG/DL (ref 65–99)
HCT VFR BLD AUTO: 56.7 % (ref 42–52)
HGB BLD-MCNC: 19.1 G/DL (ref 14–18)
IMM GRANULOCYTES # BLD AUTO: 0.02 K/UL (ref 0–0.11)
IMM GRANULOCYTES NFR BLD AUTO: 0.3 % (ref 0–0.9)
LIPASE SERPL-CCNC: 22 U/L (ref 7–58)
LV EJECT FRACT  99904: 55
LV EJECT FRACT MOD 2C 99903: 50.94
LV EJECT FRACT MOD 4C 99902: 52.68
LV EJECT FRACT MOD BP 99901: 52.16
LYMPHOCYTES # BLD AUTO: 1.38 K/UL (ref 1–4.8)
LYMPHOCYTES NFR BLD: 21.1 % (ref 22–41)
MCH RBC QN AUTO: 30.2 PG (ref 27–33)
MCHC RBC AUTO-ENTMCNC: 33.7 G/DL (ref 33.7–35.3)
MCV RBC AUTO: 89.6 FL (ref 81.4–97.8)
MONOCYTES # BLD AUTO: 0.53 K/UL (ref 0–0.85)
MONOCYTES NFR BLD AUTO: 8.1 % (ref 0–13.4)
NEUTROPHILS # BLD AUTO: 4.54 K/UL (ref 1.82–7.42)
NEUTROPHILS NFR BLD: 69.2 % (ref 44–72)
NRBC # BLD AUTO: 0 K/UL
NRBC BLD-RTO: 0 /100 WBC
PLATELET # BLD AUTO: 161 K/UL (ref 164–446)
PMV BLD AUTO: 8.6 FL (ref 9–12.9)
POTASSIUM SERPL-SCNC: 3.4 MMOL/L (ref 3.6–5.5)
PROT SERPL-MCNC: 7.8 G/DL (ref 6–8.2)
RBC # BLD AUTO: 6.33 M/UL (ref 4.7–6.1)
RSV RNA SPEC QL NAA+PROBE: NEGATIVE
SARS-COV-2 RNA RESP QL NAA+PROBE: NOTDETECTED
SODIUM SERPL-SCNC: 140 MMOL/L (ref 135–145)
SPECIMEN SOURCE: NORMAL
TROPONIN T SERPL-MCNC: 11 NG/L (ref 6–19)
WBC # BLD AUTO: 6.6 K/UL (ref 4.8–10.8)

## 2022-09-19 PROCEDURE — 96375 TX/PRO/DX INJ NEW DRUG ADDON: CPT

## 2022-09-19 PROCEDURE — 770020 HCHG ROOM/CARE - TELE (206)

## 2022-09-19 PROCEDURE — 700111 HCHG RX REV CODE 636 W/ 250 OVERRIDE (IP): Performed by: INTERNAL MEDICINE

## 2022-09-19 PROCEDURE — 99285 EMERGENCY DEPT VISIT HI MDM: CPT

## 2022-09-19 PROCEDURE — 93306 TTE W/DOPPLER COMPLETE: CPT

## 2022-09-19 PROCEDURE — 700102 HCHG RX REV CODE 250 W/ 637 OVERRIDE(OP): Performed by: EMERGENCY MEDICINE

## 2022-09-19 PROCEDURE — 36415 COLL VENOUS BLD VENIPUNCTURE: CPT

## 2022-09-19 PROCEDURE — HZ2ZZZZ DETOXIFICATION SERVICES FOR SUBSTANCE ABUSE TREATMENT: ICD-10-PCS | Performed by: INTERNAL MEDICINE

## 2022-09-19 PROCEDURE — 96374 THER/PROPH/DIAG INJ IV PUSH: CPT

## 2022-09-19 PROCEDURE — 99223 1ST HOSP IP/OBS HIGH 75: CPT | Performed by: INTERNAL MEDICINE

## 2022-09-19 PROCEDURE — 700101 HCHG RX REV CODE 250: Performed by: EMERGENCY MEDICINE

## 2022-09-19 PROCEDURE — 700111 HCHG RX REV CODE 636 W/ 250 OVERRIDE (IP): Performed by: EMERGENCY MEDICINE

## 2022-09-19 PROCEDURE — 93306 TTE W/DOPPLER COMPLETE: CPT | Mod: 26 | Performed by: INTERNAL MEDICINE

## 2022-09-19 PROCEDURE — 82077 ASSAY SPEC XCP UR&BREATH IA: CPT

## 2022-09-19 PROCEDURE — A9270 NON-COVERED ITEM OR SERVICE: HCPCS | Performed by: INTERNAL MEDICINE

## 2022-09-19 PROCEDURE — 700105 HCHG RX REV CODE 258: Performed by: EMERGENCY MEDICINE

## 2022-09-19 PROCEDURE — 85025 COMPLETE CBC W/AUTO DIFF WBC: CPT

## 2022-09-19 PROCEDURE — 96376 TX/PRO/DX INJ SAME DRUG ADON: CPT

## 2022-09-19 PROCEDURE — 80307 DRUG TEST PRSMV CHEM ANLYZR: CPT

## 2022-09-19 PROCEDURE — 700102 HCHG RX REV CODE 250 W/ 637 OVERRIDE(OP): Performed by: INTERNAL MEDICINE

## 2022-09-19 PROCEDURE — A9270 NON-COVERED ITEM OR SERVICE: HCPCS | Performed by: EMERGENCY MEDICINE

## 2022-09-19 PROCEDURE — 93005 ELECTROCARDIOGRAM TRACING: CPT | Performed by: EMERGENCY MEDICINE

## 2022-09-19 PROCEDURE — 0241U HCHG SARS-COV-2 COVID-19 NFCT DS RESP RNA 4 TRGT MIC: CPT

## 2022-09-19 PROCEDURE — 700117 HCHG RX CONTRAST REV CODE 255: Performed by: INTERNAL MEDICINE

## 2022-09-19 PROCEDURE — 71045 X-RAY EXAM CHEST 1 VIEW: CPT

## 2022-09-19 PROCEDURE — 85379 FIBRIN DEGRADATION QUANT: CPT

## 2022-09-19 PROCEDURE — 71275 CT ANGIOGRAPHY CHEST: CPT

## 2022-09-19 PROCEDURE — C9803 HOPD COVID-19 SPEC COLLECT: HCPCS | Performed by: EMERGENCY MEDICINE

## 2022-09-19 PROCEDURE — 83690 ASSAY OF LIPASE: CPT

## 2022-09-19 PROCEDURE — 84484 ASSAY OF TROPONIN QUANT: CPT

## 2022-09-19 PROCEDURE — 80053 COMPREHEN METABOLIC PANEL: CPT

## 2022-09-19 RX ORDER — OMEPRAZOLE 20 MG/1
20 CAPSULE, DELAYED RELEASE ORAL DAILY
Status: DISCONTINUED | OUTPATIENT
Start: 2022-09-20 | End: 2022-09-20 | Stop reason: HOSPADM

## 2022-09-19 RX ORDER — ALBUTEROL SULFATE 90 UG/1
2 AEROSOL, METERED RESPIRATORY (INHALATION) ONCE
Status: COMPLETED | OUTPATIENT
Start: 2022-09-19 | End: 2022-09-19

## 2022-09-19 RX ORDER — LORAZEPAM 1 MG/1
1 TABLET ORAL EVERY 4 HOURS PRN
Status: DISCONTINUED | OUTPATIENT
Start: 2022-09-19 | End: 2022-09-20 | Stop reason: HOSPADM

## 2022-09-19 RX ORDER — PROMETHAZINE HYDROCHLORIDE 25 MG/1
12.5-25 TABLET ORAL EVERY 4 HOURS PRN
Status: DISCONTINUED | OUTPATIENT
Start: 2022-09-19 | End: 2022-09-20 | Stop reason: HOSPADM

## 2022-09-19 RX ORDER — ONDANSETRON 4 MG/1
4 TABLET, ORALLY DISINTEGRATING ORAL EVERY 4 HOURS PRN
Status: DISCONTINUED | OUTPATIENT
Start: 2022-09-19 | End: 2022-09-20 | Stop reason: HOSPADM

## 2022-09-19 RX ORDER — ENOXAPARIN SODIUM 150 MG/ML
1 INJECTION SUBCUTANEOUS EVERY 12 HOURS
Status: DISCONTINUED | OUTPATIENT
Start: 2022-09-19 | End: 2022-09-20

## 2022-09-19 RX ORDER — LORAZEPAM 1 MG/1
4 TABLET ORAL
Status: DISCONTINUED | OUTPATIENT
Start: 2022-09-19 | End: 2022-09-20 | Stop reason: HOSPADM

## 2022-09-19 RX ORDER — LORAZEPAM 2 MG/ML
1 INJECTION INTRAMUSCULAR ONCE
Status: COMPLETED | OUTPATIENT
Start: 2022-09-19 | End: 2022-09-19

## 2022-09-19 RX ORDER — HYDRALAZINE HYDROCHLORIDE 20 MG/ML
10 INJECTION INTRAMUSCULAR; INTRAVENOUS EVERY 4 HOURS PRN
Status: DISCONTINUED | OUTPATIENT
Start: 2022-09-19 | End: 2022-09-20 | Stop reason: HOSPADM

## 2022-09-19 RX ORDER — PROMETHAZINE HYDROCHLORIDE 25 MG/1
12.5-25 SUPPOSITORY RECTAL EVERY 4 HOURS PRN
Status: DISCONTINUED | OUTPATIENT
Start: 2022-09-19 | End: 2022-09-20 | Stop reason: HOSPADM

## 2022-09-19 RX ORDER — ONDANSETRON 2 MG/ML
4 INJECTION INTRAMUSCULAR; INTRAVENOUS EVERY 4 HOURS PRN
Status: DISCONTINUED | OUTPATIENT
Start: 2022-09-19 | End: 2022-09-20 | Stop reason: HOSPADM

## 2022-09-19 RX ORDER — POTASSIUM CHLORIDE 20 MEQ/1
40 TABLET, EXTENDED RELEASE ORAL ONCE
Status: COMPLETED | OUTPATIENT
Start: 2022-09-19 | End: 2022-09-19

## 2022-09-19 RX ORDER — LABETALOL HYDROCHLORIDE 5 MG/ML
10 INJECTION, SOLUTION INTRAVENOUS EVERY 4 HOURS PRN
Status: DISCONTINUED | OUTPATIENT
Start: 2022-09-19 | End: 2022-09-20 | Stop reason: HOSPADM

## 2022-09-19 RX ORDER — ENOXAPARIN SODIUM 100 MG/ML
40 INJECTION SUBCUTANEOUS DAILY
Status: DISCONTINUED | OUTPATIENT
Start: 2022-09-20 | End: 2022-09-19

## 2022-09-19 RX ORDER — LORAZEPAM 1 MG/1
2 TABLET ORAL
Status: DISCONTINUED | OUTPATIENT
Start: 2022-09-19 | End: 2022-09-20 | Stop reason: HOSPADM

## 2022-09-19 RX ORDER — LORAZEPAM 0.5 MG/1
0.5 TABLET ORAL EVERY 4 HOURS PRN
Status: DISCONTINUED | OUTPATIENT
Start: 2022-09-19 | End: 2022-09-20 | Stop reason: HOSPADM

## 2022-09-19 RX ORDER — BISACODYL 10 MG
10 SUPPOSITORY, RECTAL RECTAL
Status: DISCONTINUED | OUTPATIENT
Start: 2022-09-19 | End: 2022-09-20 | Stop reason: HOSPADM

## 2022-09-19 RX ORDER — FOLIC ACID 1 MG/1
1 TABLET ORAL DAILY
Status: DISCONTINUED | OUTPATIENT
Start: 2022-09-19 | End: 2022-09-20 | Stop reason: HOSPADM

## 2022-09-19 RX ORDER — LOSARTAN POTASSIUM 25 MG/1
50 TABLET ORAL 2 TIMES DAILY
Status: DISCONTINUED | OUTPATIENT
Start: 2022-09-20 | End: 2022-09-20 | Stop reason: HOSPADM

## 2022-09-19 RX ORDER — PROCHLORPERAZINE EDISYLATE 5 MG/ML
5-10 INJECTION INTRAMUSCULAR; INTRAVENOUS EVERY 4 HOURS PRN
Status: DISCONTINUED | OUTPATIENT
Start: 2022-09-19 | End: 2022-09-20 | Stop reason: HOSPADM

## 2022-09-19 RX ORDER — LORAZEPAM 1 MG/1
3 TABLET ORAL
Status: DISCONTINUED | OUTPATIENT
Start: 2022-09-19 | End: 2022-09-20 | Stop reason: HOSPADM

## 2022-09-19 RX ORDER — DIAZEPAM 5 MG/ML
5 INJECTION, SOLUTION INTRAMUSCULAR; INTRAVENOUS
Status: DISCONTINUED | OUTPATIENT
Start: 2022-09-19 | End: 2022-09-20

## 2022-09-19 RX ORDER — IBUPROFEN 200 MG
600 TABLET ORAL EVERY 6 HOURS PRN
COMMUNITY
End: 2022-09-20

## 2022-09-19 RX ORDER — AMLODIPINE BESYLATE 5 MG/1
10 TABLET ORAL
Status: DISCONTINUED | OUTPATIENT
Start: 2022-09-19 | End: 2022-09-20 | Stop reason: HOSPADM

## 2022-09-19 RX ORDER — SODIUM CHLORIDE, SODIUM LACTATE, POTASSIUM CHLORIDE, CALCIUM CHLORIDE 600; 310; 30; 20 MG/100ML; MG/100ML; MG/100ML; MG/100ML
1000 INJECTION, SOLUTION INTRAVENOUS ONCE
Status: COMPLETED | OUTPATIENT
Start: 2022-09-19 | End: 2022-09-19

## 2022-09-19 RX ORDER — AMOXICILLIN 250 MG
2 CAPSULE ORAL 2 TIMES DAILY
Status: DISCONTINUED | OUTPATIENT
Start: 2022-09-19 | End: 2022-09-20 | Stop reason: HOSPADM

## 2022-09-19 RX ORDER — LOSARTAN POTASSIUM 50 MG/1
50 TABLET ORAL DAILY
COMMUNITY

## 2022-09-19 RX ORDER — LOSARTAN POTASSIUM 25 MG/1
50 TABLET ORAL DAILY
Status: DISCONTINUED | OUTPATIENT
Start: 2022-09-21 | End: 2022-09-19

## 2022-09-19 RX ORDER — GAUZE BANDAGE 2" X 2"
100 BANDAGE TOPICAL DAILY
Status: DISCONTINUED | OUTPATIENT
Start: 2022-09-19 | End: 2022-09-20 | Stop reason: HOSPADM

## 2022-09-19 RX ORDER — TESTOSTERONE CYPIONATE 200 MG/ML
200 VIAL (ML) INTRAMUSCULAR
COMMUNITY
End: 2022-09-20

## 2022-09-19 RX ORDER — POLYETHYLENE GLYCOL 3350 17 G/17G
1 POWDER, FOR SOLUTION ORAL
Status: DISCONTINUED | OUTPATIENT
Start: 2022-09-19 | End: 2022-09-20 | Stop reason: HOSPADM

## 2022-09-19 RX ORDER — LABETALOL HYDROCHLORIDE 5 MG/ML
10 INJECTION, SOLUTION INTRAVENOUS ONCE
Status: COMPLETED | OUTPATIENT
Start: 2022-09-19 | End: 2022-09-19

## 2022-09-19 RX ADMIN — LORAZEPAM 1 MG: 2 INJECTION INTRAMUSCULAR; INTRAVENOUS at 12:49

## 2022-09-19 RX ADMIN — LORAZEPAM 1 MG: 2 INJECTION INTRAMUSCULAR; INTRAVENOUS at 09:22

## 2022-09-19 RX ADMIN — FOLIC ACID 1 MG: 1 TABLET ORAL at 19:22

## 2022-09-19 RX ADMIN — AMLODIPINE BESYLATE 10 MG: 5 TABLET ORAL at 14:30

## 2022-09-19 RX ADMIN — HYDRALAZINE HYDROCHLORIDE 10 MG: 20 INJECTION INTRAMUSCULAR; INTRAVENOUS at 20:58

## 2022-09-19 RX ADMIN — ENOXAPARIN SODIUM 150 MG: 150 INJECTION, SOLUTION INTRAVENOUS; SUBCUTANEOUS at 23:59

## 2022-09-19 RX ADMIN — IOHEXOL 82 ML: 350 INJECTION, SOLUTION INTRAVENOUS at 22:48

## 2022-09-19 RX ADMIN — THERA TABS 1 TABLET: TAB at 19:21

## 2022-09-19 RX ADMIN — LABETALOL HYDROCHLORIDE 10 MG: 5 INJECTION, SOLUTION INTRAVENOUS at 14:21

## 2022-09-19 RX ADMIN — POTASSIUM CHLORIDE 40 MEQ: 1500 TABLET, EXTENDED RELEASE ORAL at 19:21

## 2022-09-19 RX ADMIN — SODIUM CHLORIDE, POTASSIUM CHLORIDE, SODIUM LACTATE AND CALCIUM CHLORIDE 1000 ML: 600; 310; 30; 20 INJECTION, SOLUTION INTRAVENOUS at 09:27

## 2022-09-19 RX ADMIN — ALBUTEROL SULFATE 2 PUFF: 90 AEROSOL, METERED RESPIRATORY (INHALATION) at 11:39

## 2022-09-19 RX ADMIN — THIAMINE HCL TAB 100 MG 100 MG: 100 TAB at 19:22

## 2022-09-19 ASSESSMENT — LIFESTYLE VARIABLES
TREMOR: *
ANXIETY: NO ANXIETY (AT EASE)
TOTAL SCORE: 3
VISUAL DISTURBANCES: NOT PRESENT
PAROXYSMAL SWEATS: NO SWEAT VISIBLE
ORIENTATION AND CLOUDING OF SENSORIUM: ORIENTED AND CAN DO SERIAL ADDITIONS
AGITATION: NORMAL ACTIVITY
AUDITORY DISTURBANCES: NOT PRESENT
NAUSEA AND VOMITING: NO NAUSEA AND NO VOMITING
HEADACHE, FULLNESS IN HEAD: NOT PRESENT

## 2022-09-19 ASSESSMENT — ENCOUNTER SYMPTOMS
COUGH: 0
CONSTIPATION: 0
CHILLS: 0
SHORTNESS OF BREATH: 1
PALPITATIONS: 0
VOMITING: 0
TREMORS: 1
FEVER: 0
DIZZINESS: 0
ABDOMINAL PAIN: 0
DIARRHEA: 0
BACK PAIN: 0
HEADACHES: 0
NAUSEA: 0

## 2022-09-19 ASSESSMENT — FIBROSIS 4 INDEX: FIB4 SCORE: 1.52

## 2022-09-19 NOTE — ED TRIAGE NOTES
"Chief Complaint   Patient presents with    Blood Pressure Problem     Pt states BP \"really high\" \"186/120\", states did take prescribed medication this am, states did drink too much this weekend    Shaking     Pt noted to have visible tremors, states alcohol yesterday, denies Hx of SZ    Alcohol Problem     \"I DRANK WAY TOO MUCH OVER THE WEEKEND, LOTS OF STRESS, GETTING A DIVORCE.\"      "

## 2022-09-19 NOTE — ED TRIAGE NOTES
"Chief Complaint   Patient presents with    Blood Pressure Problem     Pt states BP \"really high\" \"186/120\", states did take prescribed medication this am, states did drink too much this weekend    Shaking     Pt noted to have visible tremors, states alcohol yesterday, denies Hx of SZ     BP (!) 208/123   Pulse 88   Temp 37.2 °C (99 °F) (Temporal)   Resp 18   Ht 1.905 m (6' 3\")   Wt (!) 131 kg (287 lb 14.7 oz)   SpO2 93%   BMI 35.99 kg/m²     Pt states drank heavily this weekend, checked BP this am, remains high after taking one Losartan @ 0500 this am.      Pt currently denies c/o CP or abd pain or back pain.    SEEMA BP:  189/111  "

## 2022-09-19 NOTE — ED NOTES
Patient remains tachycardic, room air o2 sat 91% at this time. Dr. Perez at bedside to reevaluate.
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
## 2022-09-19 NOTE — H&P
"Hospital Medicine History & Physical Note    Date of Service  9/19/2022    Primary Care Physician  Tam Rg M.D.    Consultants  None    Code Status  Full Code    Chief Complaint  Chief Complaint   Patient presents with    Blood Pressure Problem     Pt states BP \"really high\" \"186/120\", states did take prescribed medication this am, states did drink too much this weekend    Shaking     Pt noted to have visible tremors, states alcohol yesterday, denies Hx of SZ    Alcohol Problem     \"I DRANK WAY TOO MUCH OVER THE WEEKEND, LOTS OF STRESS, GETTING A DIVORCE.\"       History of Presenting Illness  Milton Stewart is a 56 y.o. male PMH alcohol use disorder, asthma, hypertension, obesity class II who presented 9/19/2022 with complaints of tremors after stopping alcohol.  Patient states he has been drinking for years, preferably hard liquor.  Patient stated he noticed tremors at home, he admitted to withdrawal symptoms in the past but no seizures or hallucinations.  Patient states he wants to quit.    In the ED, spoke with EDP who stated patient was having hypertensive emergency.  Blood pressure on arrival 208/123, repeat showed 193/113.  Patient's potassium 3.4, appears dehydrated.  D-dimer 1.1.    I discussed the plan of care with patient, bedside RN, charge RN, , and pharmacy.    Review of Systems  Review of Systems   Constitutional:  Negative for chills, fever and malaise/fatigue.   Respiratory:  Positive for shortness of breath. Negative for cough.    Cardiovascular:  Negative for chest pain and palpitations.   Gastrointestinal:  Negative for abdominal pain, constipation, diarrhea, nausea and vomiting.   Musculoskeletal:  Negative for back pain and joint pain.   Neurological:  Positive for tremors. Negative for dizziness and headaches.   All other systems reviewed and are negative.    Past Medical History   has a past medical history of ASTHMA, ETOH abuse, Heart burn, Hypertension, " Sleep apnea, and Snoring.    Surgical History   has a past surgical history that includes woody by laparoscopy (2005); tonsillectomy (1974); and biceps tendon repair (Right, 6/22/2018).     Family History  family history includes Diabetes in his mother; Heart Disease in his brother, father, and mother; Kidney Disease in his mother; Other in his father; Stroke in his brother.   Family history reviewed with patient. There is no family history that is pertinent to the chief complaint.     Social History   reports that he has never smoked. He has never used smokeless tobacco. He reports that he does not currently use alcohol after a past usage of about 15.0 oz per week. He reports that he does not currently use drugs.    Allergies  No Known Allergies    Medications  Prior to Admission Medications   Prescriptions Last Dose Informant Patient Reported? Taking?   Nutritional Supplements (ADULT GROWTH HORMONE SUPPORT PO) 9/18/2022 at [facility] Patient Yes Yes   Sig: Take 1 Tablet by mouth see administration instructions. DAILY 6 DAYS OUT OF THE WEEK   Testosterone Cypionate 200 MG/ML Solution 9/13/2022 at [facility] Patient Yes Yes   Sig: Inject 200 mg as directed every 7 days. 200mg = 1 cc   ibuprofen (MOTRIN) 200 MG Tab 9/17/2022 at AM Patient Yes Yes   Sig: Take 600 mg by mouth every 6 hours as needed for Mild Pain.   losartan (COZAAR) 50 MG Tab 9/19/2022 at 0600 Patient Yes Yes   Sig: Take 50 mg by mouth every day.   omeprazole (PRILOSEC) 20 MG delayed-release capsule 9/18/2022 at AM Patient Yes No   Sig: Take 20 mg by mouth 1 time daily as needed.      Facility-Administered Medications: None       Physical Exam  Temp:  [37.2 °C (99 °F)] 37.2 °C (99 °F)  Pulse:  [76-99] 86  Resp:  [11-22] 15  BP: (182-208)/(108-123) 186/112  SpO2:  [88 %-93 %] 90 %  Blood Pressure: (!) 199/108   Temperature: 37.2 °C (99 °F)   Pulse: 87   Respiration: 17   Pulse Oximetry: 89 %       Physical Exam  Vitals and nursing note reviewed.   Constitutional:        General: He is not in acute distress.     Appearance: He is obese. He is ill-appearing.   HENT:      Head: Normocephalic and atraumatic.      Comments: Red faced     Mouth/Throat:      Mouth: Mucous membranes are dry.      Pharynx: No oropharyngeal exudate.   Cardiovascular:      Rate and Rhythm: Normal rate and regular rhythm.      Pulses: Normal pulses.      Heart sounds: Normal heart sounds. No murmur heard.  Pulmonary:      Effort: Pulmonary effort is normal. No respiratory distress.      Breath sounds: Normal breath sounds. No wheezing or rhonchi.   Abdominal:      General: Bowel sounds are normal. There is no distension.      Tenderness: There is no abdominal tenderness.   Musculoskeletal:         General: No swelling or tenderness. Normal range of motion.      Cervical back: Normal range of motion and neck supple. No tenderness.      Right lower leg: No edema.      Left lower leg: No edema.   Skin:     General: Skin is warm.      Capillary Refill: Capillary refill takes less than 2 seconds.      Coloration: Skin is not jaundiced or pale.   Neurological:      General: No focal deficit present.      Mental Status: He is alert and oriented to person, place, and time. Mental status is at baseline.      Motor: No weakness.   Psychiatric:         Mood and Affect: Mood normal.         Behavior: Behavior normal.         Thought Content: Thought content normal.         Judgment: Judgment normal.       Laboratory:  Recent Labs     09/19/22  0905   WBC 6.6   RBC 6.33*   HEMOGLOBIN 19.1*   HEMATOCRIT 56.7*   MCV 89.6   MCH 30.2   MCHC 33.7   RDW 50.8*   PLATELETCT 161*   MPV 8.6*     Recent Labs     09/19/22  0905   SODIUM 140   POTASSIUM 3.4*   CHLORIDE 98   CO2 26   GLUCOSE 97   BUN 9   CREATININE 0.88   CALCIUM 8.8     Recent Labs     09/19/22  0905   ALTSGPT 19   ASTSGOT 28   ALKPHOSPHAT 81   TBILIRUBIN 1.0   LIPASE 22   GLUCOSE 97         No results for input(s): NTPROBNP in the last 72 hours.      Recent Labs      09/19/22  0905   TROPONINT 11       Imaging:  EC-ECHOCARDIOGRAM COMPLETE W/O CONT   Final Result      DX-CHEST-PORTABLE (1 VIEW)   Final Result         1. No acute cardiopulmonary abnormalities are identified.          X-Ray:  I have personally reviewed the images and compared with prior images.  EKG:  I have personally reviewed the images and compared with prior images.    Assessment/Plan:  Justification for Admission Status  I anticipate this patient will require at least two midnights for appropriate medical management, necessitating inpatient admission because patient will need be treated for his alcohol withdrawal symptoms, hypokalemia, and new onset hypoxia with unclear etiology.     Patient will need a  bed on EMERGENCY service .  The need is secondary to hypoxia and alcohol withdrawal.    * Alcohol withdrawal syndrome, uncomplicated (HCC)- (present on admission)  Assessment & Plan  Patient stated he is having active tremors at home.  Possibly withdrawal is causing patient's hypertensive emergency  CIWA protocol  P.o. vitamins    Hypoxia- (present on admission)  Assessment & Plan  Patient 88% on room air.  No oxygen at home prior.  Unclear etiology  D-dimer 1.1  Checking CTA chest  Does not appear to be CHF exacerbation, patient does not peripheral edema or pulmonary edema  Patient does have a history of asthma but no wheezing on exam  May be due to hypertensive emergency.    Mild intermittent asthma without complication- (present on admission)  Assessment & Plan  At this time questionable if patient is in acute exacerbation.  He is currently using oxygen which she does not use at home.  No obvious wheezing on exam.  RT consult for breathing treatments  Okay to hold steroids at this time    Long-term current use of testosterone replacement therapy- (present on admission)  Assessment & Plan  Patient stated he uses testosterone frequently  Counseled patient on testosterone and cardiac effects.  Recommended  the patient to speak with a cardiologist prior to continue use of testosterone at home.  He stated he was not informed by his provider/prescriber about cardiac effects.    Left ventricular systolic dysfunction- (present on admission)  Assessment & Plan  Patient has history of systolic dysfunction, new echocardiogram shows LVEF 55% with grade 1 diastolic dysfunction  Improved heart function  Continue losartan  Patient is not in acute exacerbation    Hypokalemia- (present on admission)  Assessment & Plan  3.4, replacing via p.o.  Recheck in the morning    Alcohol use- (present on admission)  Assessment & Plan  Chronic, severe use  Patient willing to quit, counseled on admission for cessation  [facility] protocol    HTN (hypertension)- (present on admission)  Assessment & Plan  Patient in hypertensive emergency.  Patient only takes losartan 50 mg at home, changing to twice daily dosing.  Adding amlodipine 10 mg  IV as needed hydralazine, avoid ACE inhibitors as patient will get a CT scan    VTE prophylaxis: enoxaparin ppx

## 2022-09-20 VITALS
DIASTOLIC BLOOD PRESSURE: 93 MMHG | SYSTOLIC BLOOD PRESSURE: 138 MMHG | TEMPERATURE: 99.2 F | BODY MASS INDEX: 35.8 KG/M2 | WEIGHT: 287.92 LBS | HEART RATE: 89 BPM | RESPIRATION RATE: 19 BRPM | OXYGEN SATURATION: 90 % | HEIGHT: 75 IN

## 2022-09-20 PROBLEM — R09.02 HYPOXIA: Status: RESOLVED | Noted: 2022-09-19 | Resolved: 2022-09-20

## 2022-09-20 PROBLEM — I26.99 ACUTE PULMONARY EMBOLISM WITHOUT ACUTE COR PULMONALE (HCC): Status: ACTIVE | Noted: 2022-09-20

## 2022-09-20 LAB
ALBUMIN SERPL BCP-MCNC: 3.6 G/DL (ref 3.2–4.9)
ALBUMIN/GLOB SERPL: 1.2 G/DL
ALP SERPL-CCNC: 76 U/L (ref 30–99)
ALT SERPL-CCNC: 17 U/L (ref 2–50)
AMPHET UR QL SCN: NEGATIVE
ANION GAP SERPL CALC-SCNC: 12 MMOL/L (ref 7–16)
AST SERPL-CCNC: 26 U/L (ref 12–45)
BARBITURATES UR QL SCN: NEGATIVE
BENZODIAZ UR QL SCN: NEGATIVE
BILIRUB SERPL-MCNC: 1.5 MG/DL (ref 0.1–1.5)
BUN SERPL-MCNC: 9 MG/DL (ref 8–22)
BZE UR QL SCN: NEGATIVE
CALCIUM SERPL-MCNC: 8.3 MG/DL (ref 8.4–10.2)
CANNABINOIDS UR QL SCN: NEGATIVE
CHLORIDE SERPL-SCNC: 97 MMOL/L (ref 96–112)
CO2 SERPL-SCNC: 28 MMOL/L (ref 20–33)
CREAT SERPL-MCNC: 0.83 MG/DL (ref 0.5–1.4)
ERYTHROCYTE [DISTWIDTH] IN BLOOD BY AUTOMATED COUNT: 51.6 FL (ref 35.9–50)
GFR SERPLBLD CREATININE-BSD FMLA CKD-EPI: 102 ML/MIN/1.73 M 2
GLOBULIN SER CALC-MCNC: 3.1 G/DL (ref 1.9–3.5)
GLUCOSE SERPL-MCNC: 99 MG/DL (ref 65–99)
HCT VFR BLD AUTO: 53.2 % (ref 42–52)
HGB BLD-MCNC: 18.3 G/DL (ref 14–18)
MAGNESIUM SERPL-MCNC: 1.2 MG/DL (ref 1.5–2.5)
MCH RBC QN AUTO: 30.9 PG (ref 27–33)
MCHC RBC AUTO-ENTMCNC: 34.4 G/DL (ref 33.7–35.3)
MCV RBC AUTO: 89.7 FL (ref 81.4–97.8)
METHADONE UR QL SCN: NEGATIVE
OPIATES UR QL SCN: NEGATIVE
OXYCODONE UR QL SCN: NEGATIVE
PCP UR QL SCN: NEGATIVE
PHOSPHATE SERPL-MCNC: 2.9 MG/DL (ref 2.5–4.5)
PLATELET # BLD AUTO: 147 K/UL (ref 164–446)
PMV BLD AUTO: 9.2 FL (ref 9–12.9)
POTASSIUM SERPL-SCNC: 2.9 MMOL/L (ref 3.6–5.5)
PROPOXYPH UR QL SCN: NEGATIVE
PROT SERPL-MCNC: 6.7 G/DL (ref 6–8.2)
RBC # BLD AUTO: 5.93 M/UL (ref 4.7–6.1)
SODIUM SERPL-SCNC: 137 MMOL/L (ref 135–145)
WBC # BLD AUTO: 8.3 K/UL (ref 4.8–10.8)

## 2022-09-20 PROCEDURE — 36415 COLL VENOUS BLD VENIPUNCTURE: CPT

## 2022-09-20 PROCEDURE — 83735 ASSAY OF MAGNESIUM: CPT

## 2022-09-20 PROCEDURE — 84100 ASSAY OF PHOSPHORUS: CPT

## 2022-09-20 PROCEDURE — 700111 HCHG RX REV CODE 636 W/ 250 OVERRIDE (IP): Performed by: INTERNAL MEDICINE

## 2022-09-20 PROCEDURE — 80053 COMPREHEN METABOLIC PANEL: CPT

## 2022-09-20 PROCEDURE — 99239 HOSP IP/OBS DSCHRG MGMT >30: CPT | Performed by: HOSPITALIST

## 2022-09-20 PROCEDURE — 700102 HCHG RX REV CODE 250 W/ 637 OVERRIDE(OP): Performed by: INTERNAL MEDICINE

## 2022-09-20 PROCEDURE — 85027 COMPLETE CBC AUTOMATED: CPT

## 2022-09-20 PROCEDURE — A9270 NON-COVERED ITEM OR SERVICE: HCPCS | Performed by: INTERNAL MEDICINE

## 2022-09-20 PROCEDURE — 94760 N-INVAS EAR/PLS OXIMETRY 1: CPT

## 2022-09-20 RX ORDER — CHOLECALCIFEROL (VITAMIN D3) 125 MCG
5 CAPSULE ORAL NIGHTLY
Status: DISCONTINUED | OUTPATIENT
Start: 2022-09-20 | End: 2022-09-20 | Stop reason: HOSPADM

## 2022-09-20 RX ORDER — MAGNESIUM SULFATE HEPTAHYDRATE 40 MG/ML
2 INJECTION, SOLUTION INTRAVENOUS ONCE
Status: COMPLETED | OUTPATIENT
Start: 2022-09-20 | End: 2022-09-20

## 2022-09-20 RX ORDER — LORAZEPAM 2 MG/ML
2 INJECTION INTRAMUSCULAR
Status: DISCONTINUED | OUTPATIENT
Start: 2022-09-20 | End: 2022-09-20 | Stop reason: HOSPADM

## 2022-09-20 RX ORDER — POTASSIUM CHLORIDE 20 MEQ/1
40 TABLET, EXTENDED RELEASE ORAL 2 TIMES DAILY
Status: DISCONTINUED | OUTPATIENT
Start: 2022-09-20 | End: 2022-09-20 | Stop reason: HOSPADM

## 2022-09-20 RX ORDER — LANOLIN ALCOHOL/MO/W.PET/CERES
400 CREAM (GRAM) TOPICAL DAILY
Qty: 30 TABLET | Refills: 3 | Status: SHIPPED | OUTPATIENT
Start: 2022-09-20

## 2022-09-20 RX ORDER — AMLODIPINE BESYLATE 10 MG/1
10 TABLET ORAL DAILY
Qty: 30 TABLET | Refills: 3 | Status: SHIPPED | OUTPATIENT
Start: 2022-09-21

## 2022-09-20 RX ORDER — ENOXAPARIN SODIUM 150 MG/ML
1 INJECTION SUBCUTANEOUS 2 TIMES DAILY
Status: DISCONTINUED | OUTPATIENT
Start: 2022-09-20 | End: 2022-09-20 | Stop reason: HOSPADM

## 2022-09-20 RX ORDER — POTASSIUM CHLORIDE 20 MEQ/1
20 TABLET, EXTENDED RELEASE ORAL DAILY
Qty: 7 TABLET | Refills: 0 | Status: SHIPPED | OUTPATIENT
Start: 2022-09-20 | End: 2022-09-27

## 2022-09-20 RX ADMIN — THERA TABS 1 TABLET: TAB at 05:16

## 2022-09-20 RX ADMIN — LOSARTAN POTASSIUM 50 MG: 25 TABLET, FILM COATED ORAL at 05:16

## 2022-09-20 RX ADMIN — POTASSIUM CHLORIDE 40 MEQ: 1500 TABLET, EXTENDED RELEASE ORAL at 05:18

## 2022-09-20 RX ADMIN — MAGNESIUM SULFATE HEPTAHYDRATE 2 G: 40 INJECTION, SOLUTION INTRAVENOUS at 05:17

## 2022-09-20 RX ADMIN — FOLIC ACID 1 MG: 1 TABLET ORAL at 05:17

## 2022-09-20 RX ADMIN — Medication 5 MG: at 00:18

## 2022-09-20 RX ADMIN — ENOXAPARIN SODIUM 150 MG: 150 INJECTION SUBCUTANEOUS at 09:00

## 2022-09-20 RX ADMIN — AMLODIPINE BESYLATE 10 MG: 5 TABLET ORAL at 05:18

## 2022-09-20 RX ADMIN — THIAMINE HCL TAB 100 MG 100 MG: 100 TAB at 05:17

## 2022-09-20 ASSESSMENT — PATIENT HEALTH QUESTIONNAIRE - PHQ9
2. FEELING DOWN, DEPRESSED, IRRITABLE, OR HOPELESS: NOT AT ALL
1. LITTLE INTEREST OR PLEASURE IN DOING THINGS: NOT AT ALL
SUM OF ALL RESPONSES TO PHQ9 QUESTIONS 1 AND 2: 0

## 2022-09-20 ASSESSMENT — LIFESTYLE VARIABLES
HAVE YOU EVER FELT YOU SHOULD CUT DOWN ON YOUR DRINKING: YES
TOTAL SCORE: 3
HAVE PEOPLE ANNOYED YOU BY CRITICIZING YOUR DRINKING: NO
CONSUMPTION TOTAL: POSITIVE
HOW MANY TIMES IN THE PAST YEAR HAVE YOU HAD 5 OR MORE DRINKS IN A DAY: 10
EVER HAD A DRINK FIRST THING IN THE MORNING TO STEADY YOUR NERVES TO GET RID OF A HANGOVER: YES
AVERAGE NUMBER OF DAYS PER WEEK YOU HAVE A DRINK CONTAINING ALCOHOL: 2
ON A TYPICAL DAY WHEN YOU DRINK ALCOHOL HOW MANY DRINKS DO YOU HAVE: 8
EVER FELT BAD OR GUILTY ABOUT YOUR DRINKING: YES
ALCOHOL_USE: YES
TOTAL SCORE: 3
TOTAL SCORE: 3

## 2022-09-20 ASSESSMENT — COGNITIVE AND FUNCTIONAL STATUS - GENERAL
DAILY ACTIVITIY SCORE: 24
SUGGESTED CMS G CODE MODIFIER MOBILITY: CH
MOBILITY SCORE: 24
SUGGESTED CMS G CODE MODIFIER DAILY ACTIVITY: CH

## 2022-09-20 NOTE — DISCHARGE PLANNING
"Met with pt. He reports being independent with ADLs and IADLs. Pt observed to be independent with ambulation and does not need any assistance.     Pt said his PCP is Tam Rg MD. [facility] [address]. \"Whichever [facility] sold their business to\".         Care Transition Team Assessment    Information Source  Orientation Level: Oriented X4  Information Given By: Patient  Informant's Name: Milton  Who is responsible for making decisions for patient? : Patient    Readmission Evaluation  Is this a readmission?: No    Elopement Risk  Legal Hold: No  Ambulatory or Self Mobile in Wheelchair: No-Not an Elopement Risk    Interdisciplinary Discharge Planning  Does Admitting Nurse Feel This Could be a Complex Discharge?: No  Primary Care Physician: Dr. Tam Rg  Lives with - Patient's Self Care Capacity: Alone and Able to Care For Self  Patient or legal guardian wants to designate a caregiver: No  Support Systems: Spouse / Significant Other  Housing / Facility: 1 Story Apartment / Condo  Do You Take your Prescribed Medications Regularly: Yes  Able to Return to Previous ADL's: Yes  Mobility Issues: No  Prior Services: None, Home-Independent  Patient Prefers to be Discharged to:: Home  Assistance Needed: No  Durable Medical Equipment: Not Applicable    Discharge Preparedness  What is your plan after discharge?: Home with help  What are your discharge supports?: Spouse  Prior Functional Level: Ambulatory, Drives Self, Independent with Activities of Daily Living, Independent with Medication Management  Difficulity with ADLs: None  Difficulity with IADLs: None    Functional Assesment  Prior Functional Level: Ambulatory, Drives Self, Independent with Activities of Daily Living, Independent with Medication Management    Finances  Financial Barriers to Discharge: No  Prescription Coverage: Yes    Vision / Hearing Impairment  Vision Impairment : No  Hearing Impairment : No    Values / Beliefs / Concerns  Values " / Beliefs Concerns : No    Advance Directive  Advance Directive?: None    Domestic Abuse  Have you ever been the victim of abuse or violence?: No    Psychological Assessment  History of Substance Abuse: Alcohol    Discharge Risks or Barriers  Discharge risks or barriers?: No  Patient risk factors: Other (comment)    Anticipated Discharge Information  Discharge Disposition: Discharged to home/self care (01)

## 2022-09-20 NOTE — PROGRESS NOTES
Assumed pt care. AOx4. Denies any pain at this time.  Denies any other distress.  Discussed POC.  Pt verbalized understanding.  Hourly rounding in place. Fall precautions in place and call lights w/in reach.

## 2022-09-20 NOTE — PROGRESS NOTES
"0917: Pt would like to talk to MD about POC and new diagnoses. MD Travis notified by this RN.     1133: Pt would like to go home states he does not want to keep waiting. MD notified.     1426: Pt states he is going to go home. Pt states he is \"tired of waiting\". RN notified MD. STARK to see him ASAP. Pt updated.   RN also educated pt on potential risks if pt is to leave AMA. Pt verbalized understanding and states he will make and out pt appointment with his primary.     1456: Pt leaving AMA after education was provided a second time by RN, pt again verbalized understanding. RN returned home medications from pharmacy. IV removed, pt is on roomair at this time tolerating well without oxygen. All personal belongings returned. Pt escorted to ED.   " Ambulatory

## 2022-09-20 NOTE — DISCHARGE SUMMARY
"Discharge Summary    CHIEF COMPLAINT ON ADMISSION  Chief Complaint   Patient presents with    Blood Pressure Problem     Pt states BP \"really high\" \"186/120\", states did take prescribed medication this am, states did drink too much this weekend    Shaking     Pt noted to have visible tremors, states alcohol yesterday, denies Hx of SZ    Alcohol Problem     \"I DRANK WAY TOO MUCH OVER THE WEEKEND, LOTS OF STRESS, GETTING A DIVORCE.\"       Reason for Admission  High Blood Pressure     Admission Date  9/19/2022    CODE STATUS  Prior    HPI & HOSPITAL COURSE  This is a 56 y.o. male here with alcohol abuse, asthma, HTN, testosterone therapy presents with hypertensive crisis and elevated d-dimer 1.1 found to have a RLL pulmonary emboli on CTA chest.  BP improved with norvasc 10mg po daily.  The patient had hypokalemia and hypomagnesemia.  Unfortunately, the patient was adamant to leave and left AMA.   His wife called and I spoke to her on speaker phone with patient about his PE findings.  This could have been provoked by testosterone use.  I have instructed patient to decrease amount of testosterone or stop.  He will need xarelto for 6 months.  Norvasc for BP control and K and magnesium replacement, cessation of alcohol use recommended.  Patient was ambulating and on RA at time of discharge.  He had received 1 dose on SC lovenox and potassium prior to his departure.      Therefore, he is discharged in good and stable condition to home with close outpatient follow-up.    The patient recovered much more quickly than anticipated on admission.    Discharge Date  9/20/2022    FOLLOW UP ITEMS POST DISCHARGE  Follow up with PCP in 1-2 weeks.    DISCHARGE DIAGNOSES  Principal Problem:    Alcohol withdrawal syndrome, uncomplicated (HCC) POA: Yes  Active Problems:    HTN (hypertension) POA: Yes    Alcohol use POA: Yes    Hypokalemia POA: Yes    Left ventricular systolic dysfunction POA: Yes      Overview: Ejection fraction 45%    " Long-term current use of testosterone replacement therapy (Chronic) POA: Yes    Mild intermittent asthma without complication POA: Yes    Acute pulmonary embolism without acute cor pulmonale (HCC) POA: Yes  Resolved Problems:    Hypoxia POA: Yes      FOLLOW UP  No future appointments.  No follow-up provider specified.    MEDICATIONS ON DISCHARGE     Medication List        START taking these medications        Instructions   amLODIPine 10 MG Tabs  Start taking on: September 21, 2022  Commonly known as: NORVASC   Take 1 Tablet by mouth every day.  Dose: 10 mg     magnesium oxide 400 (240 Mg) MG Tabs   Take 1 Tablet by mouth every day.  Dose: 400 mg     potassium chloride SA 20 MEQ Tbcr  Commonly known as: Kdur   Take 1 Tablet by mouth every day for 7 days.  Dose: 20 mEq     * rivaroxaban 15 MG Tabs tablet  Commonly known as: Xarelto   Take 1 Tablet by mouth 2 times a day for 21 days.  Dose: 15 mg     * rivaroxaban 20 MG Tabs tablet  Start taking on: October 11, 2022  Commonly known as: Xarelto   Take 1 Tablet by mouth with dinner for 30 days.  Dose: 20 mg           * This list has 2 medication(s) that are the same as other medications prescribed for you. Read the directions carefully, and ask your doctor or other care provider to review them with you.                CONTINUE taking these medications        Instructions   losartan 50 MG Tabs  Commonly known as: COZAAR   Take 50 mg by mouth every day.  Dose: 50 mg     omeprazole 20 MG delayed-release capsule  Commonly known as: PRILOSEC   Take 20 mg by mouth 1 time daily as needed.  Dose: 20 mg            STOP taking these medications      ADULT GROWTH HORMONE SUPPORT PO     ibuprofen 200 MG Tabs  Commonly known as: MOTRIN     Testosterone Cypionate 200 MG/ML Soln              Allergies  No Known Allergies    DIET  No orders of the defined types were placed in this encounter.      ACTIVITY  As tolerated.  Weight bearing as  tolerated    CONSULTATIONS  none    PROCEDURES  9/19/2022 10:16 PM     HISTORY/REASON FOR EXAM:  PE suspected, low/intermediate prob, positive D-dimer  Hypoxemia     TECHNIQUE/EXAM DESCRIPTION:  CT angiogram scan for pulmonary embolism with contrast, with reconstructions.     1.25 mm helical sections were obtained from the lung apices through the lung bases following the rapid bolus administration of 82 mL of Omnipaque 350 nonionic contrast. Thin-section overlapping reconstruction interval was utilized. Coronal   reconstructions were generated from the axial data. MIP post processing was performed and utilized for the interpretation.     Low dose optimization technique was utilized for this CT exam including automated exposure control and adjustment of the mA and/or kV according to patient size.     COMPARISON: None     FINDINGS:  Motion artifact degrades the examination and limits evaluation.     Pulmonary Embolism: Yes.     Main Pulmonary Arteries: No.     Segmental branches: No.     Subsegmental branches: Yes, RIGHT lower lobe.     Only if positive for PE:     RV diameter: 3 cm.     LV diameter: 4 cm.     RV/LV ratio: 0.75. (Greater than 0.9 is abnormal.)     Additional Comments: None.     Lungs: RIGHT greater than LEFT lower lobe atelectasis.     Pleura: No pleural effusion.     Nodes: No enlarged lymph nodes.     Gallbladder has been removed.        IMPRESSION:     1.  Subsegmental RIGHT lower lobe pulmonary embolus  2.  No CT evidence of RIGHT heart strain     Findings were discussed with Dr. Driver on 9/19/2022 11:12 PM.           Exam Ended: 09/19/22 10:47 PM Last Resulted: 09/19/22 11:12 PM             LABORATORY  Lab Results   Component Value Date    SODIUM 137 09/20/2022    POTASSIUM 2.9 (L) 09/20/2022    CHLORIDE 97 09/20/2022    CO2 28 09/20/2022    GLUCOSE 99 09/20/2022    BUN 9 09/20/2022    CREATININE 0.83 09/20/2022        Lab Results   Component Value Date    WBC 8.3 09/20/2022    HEMOGLOBIN 18.3  (H) 09/20/2022    HEMATOCRIT 53.2 (H) 09/20/2022    PLATELETCT 147 (L) 09/20/2022        Total time of the discharge process exceeds 40 minutes.

## 2022-09-20 NOTE — PROGRESS NOTES
12--hour chart check complete.    Monitor Summary  Rhythm: SR  Rate: 70-90  Ectopy:   Measurements: 0.18/0.1/0.38

## 2022-09-20 NOTE — ED NOTES
Pt given late medication   Pt reports feeling better no s/s of acute distress plan to admit pt and family aware no questions

## 2022-09-20 NOTE — ED NOTES
BP noted to be trending up medicated w/PRN hydralazine  MD.Mutcandis aware of elevated dimer no new orders

## 2022-09-20 NOTE — PROGRESS NOTES
Monitor Summary     Rhythm:SR 83-99  Measurements: 0.18/0.10/0.38  ECTOPIES: r-oPVC, rCOUP, rTRIP, r-oPAC        Normal Values  Rhythm SR  HR Range    Measurements 0.12-0.20 / 0.06-0.10  / 0.30-0.52

## 2022-09-20 NOTE — PROGRESS NOTES
Paged by radiologist due to small PE.  No infarction, no right heart strain.  I will transition to full dose lovenox.  I did discuss the case with the bedside RN, pt did have mild hypoxia that quickly improved with low flow O2.  Continue to monitor pulse ox.

## 2022-09-20 NOTE — CARE PLAN
The patient is Stable - Low risk of patient condition declining or worsening    Shift Goals  Clinical Goals: monitor and maintain respiratory status with supplimental oxygen  Patient Goals: get better    Progress made toward(s) clinical / shift goals:      Problem: Optimal Care for Alcohol Withdrawal  Goal: Optimal Care for the alcohol withdrawal patient  Outcome: Progressing     Problem: Lifestyle Changes  Goal: Patient's ability to identify lifestyle changes and available resources to help reduce recurrence of condition will improve  Outcome: Progressing     Problem: Psychosocial  Goal: Patient's level of anxiety will decrease  Outcome: Progressing     Problem: Respiratory  Goal: Patient will achieve/maintain optimum respiratory ventilation and gas exchange  Outcome: Progressing

## 2022-09-20 NOTE — ASSESSMENT & PLAN NOTE
3.4, replacing via p.o.  Recheck in the morning  
At this time questionable if patient is in acute exacerbation.  He is currently using oxygen which she does not use at home.  No obvious wheezing on exam.  RT consult for breathing treatments  Okay to hold steroids at this time  
Chronic, severe use  Patient willing to quit, counseled on admission for cessation  WA protocol  
Patient 88% on room air.  No oxygen at home prior.  Unclear etiology  D-dimer 1.1  Checking CTA chest  Does not appear to be CHF exacerbation, patient does not peripheral edema or pulmonary edema  Patient does have a history of asthma but no wheezing on exam  May be due to hypertensive emergency.  
Patient has history of systolic dysfunction, new echocardiogram shows LVEF 55% with grade 1 diastolic dysfunction  Improved heart function  Continue losartan  Patient is not in acute exacerbation  
Patient in hypertensive emergency.  Patient only takes losartan 50 mg at home, changing to twice daily dosing.  Adding amlodipine 10 mg  IV as needed hydralazine, avoid ACE inhibitors as patient will get a CT scan  
Patient stated he is having active tremors at home.  Possibly withdrawal is causing patient's hypertensive emergency  WA protocol  P.o. vitamins  
Patient stated he uses testosterone frequently  Counseled patient on testosterone and cardiac effects.  Recommended the patient to speak with a cardiologist prior to continue use of testosterone at home.  He stated he was not informed by his provider/prescriber about cardiac effects.  
RLL PE seen on CTA chest  
no radiation

## 2022-09-20 NOTE — PROGRESS NOTES
4 Eyes Skin Assessment Completed by Urmila, RN and IDANIA Coffey.    Head Redness  Ears Redness  Nose Redness  Mouth WDL  Neck WDL  Breast/Chest WDL  Shoulder Blades WDL  Spine WDL  (R) Arm/Elbow/Hand WDL  (L) Arm/Elbow/Hand WDL  Abdomen WDL  Groin WDL  Scrotum/Coccyx/Buttocks WDL  (R) Leg WDL  (L) Leg WDL  (R) Heel/Foot/Toe WDL  (L) Heel/Foot/Toe WDL          Devices In Places Tele Box      Interventions In Place NC W/Ear Foams    Possible Skin Injury No    Pictures Uploaded Into Epic N/A  Wound Consult Placed N/A  RN Wound Prevention Protocol Ordered No

## 2022-11-15 ENCOUNTER — HOSPITAL ENCOUNTER (OUTPATIENT)
Dept: LAB | Facility: MEDICAL CENTER | Age: 57
End: 2022-11-15
Attending: EMERGENCY MEDICINE
Payer: COMMERCIAL

## 2022-11-15 LAB
ALBUMIN SERPL BCP-MCNC: 4.3 G/DL (ref 3.2–4.9)
ALBUMIN/GLOB SERPL: 1.3 G/DL
ALP SERPL-CCNC: 54 U/L (ref 30–99)
ALT SERPL-CCNC: 27 U/L (ref 2–50)
ANION GAP SERPL CALC-SCNC: 14 MMOL/L (ref 7–16)
AST SERPL-CCNC: 26 U/L (ref 12–45)
BASOPHILS # BLD AUTO: 0.7 % (ref 0–1.8)
BASOPHILS # BLD: 0.04 K/UL (ref 0–0.12)
BILIRUB SERPL-MCNC: 0.9 MG/DL (ref 0.1–1.5)
BUN SERPL-MCNC: 18 MG/DL (ref 8–22)
CALCIUM SERPL-MCNC: 9.6 MG/DL (ref 8.5–10.5)
CHLORIDE SERPL-SCNC: 100 MMOL/L (ref 96–112)
CHOLEST SERPL-MCNC: 171 MG/DL (ref 100–199)
CO2 SERPL-SCNC: 24 MMOL/L (ref 20–33)
CREAT SERPL-MCNC: 0.92 MG/DL (ref 0.5–1.4)
EOSINOPHIL # BLD AUTO: 0.2 K/UL (ref 0–0.51)
EOSINOPHIL NFR BLD: 3.3 % (ref 0–6.9)
ERYTHROCYTE [DISTWIDTH] IN BLOOD BY AUTOMATED COUNT: 49.8 FL (ref 35.9–50)
ESTRADIOL SERPL-MCNC: 16.9 PG/ML
FASTING STATUS PATIENT QL REPORTED: NORMAL
GFR SERPLBLD CREATININE-BSD FMLA CKD-EPI: 96 ML/MIN/1.73 M 2
GLOBULIN SER CALC-MCNC: 3.4 G/DL (ref 1.9–3.5)
GLUCOSE SERPL-MCNC: 89 MG/DL (ref 65–99)
HCT VFR BLD AUTO: 57.9 % (ref 42–52)
HDLC SERPL-MCNC: 41 MG/DL
HGB BLD-MCNC: 19.7 G/DL (ref 14–18)
IMM GRANULOCYTES # BLD AUTO: 0.02 K/UL (ref 0–0.11)
IMM GRANULOCYTES NFR BLD AUTO: 0.3 % (ref 0–0.9)
LDLC SERPL CALC-MCNC: 119 MG/DL
LYMPHOCYTES # BLD AUTO: 1.7 K/UL (ref 1–4.8)
LYMPHOCYTES NFR BLD: 28.1 % (ref 22–41)
MCH RBC QN AUTO: 30.4 PG (ref 27–33)
MCHC RBC AUTO-ENTMCNC: 34 G/DL (ref 33.7–35.3)
MCV RBC AUTO: 89.4 FL (ref 81.4–97.8)
MONOCYTES # BLD AUTO: 0.62 K/UL (ref 0–0.85)
MONOCYTES NFR BLD AUTO: 10.2 % (ref 0–13.4)
NEUTROPHILS # BLD AUTO: 3.47 K/UL (ref 1.82–7.42)
NEUTROPHILS NFR BLD: 57.4 % (ref 44–72)
NRBC # BLD AUTO: 0 K/UL
NRBC BLD-RTO: 0 /100 WBC
PLATELET # BLD AUTO: 203 K/UL (ref 164–446)
PMV BLD AUTO: 10.1 FL (ref 9–12.9)
POTASSIUM SERPL-SCNC: 4.1 MMOL/L (ref 3.6–5.5)
PROT SERPL-MCNC: 7.7 G/DL (ref 6–8.2)
PSA SERPL-MCNC: 1 NG/ML (ref 0–4)
RBC # BLD AUTO: 6.48 M/UL (ref 4.7–6.1)
SODIUM SERPL-SCNC: 138 MMOL/L (ref 135–145)
TRIGL SERPL-MCNC: 53 MG/DL (ref 0–149)
WBC # BLD AUTO: 6.1 K/UL (ref 4.8–10.8)

## 2022-11-15 PROCEDURE — 85025 COMPLETE CBC W/AUTO DIFF WBC: CPT

## 2022-11-15 PROCEDURE — 80053 COMPREHEN METABOLIC PANEL: CPT

## 2022-11-15 PROCEDURE — 84403 ASSAY OF TOTAL TESTOSTERONE: CPT

## 2022-11-15 PROCEDURE — 84305 ASSAY OF SOMATOMEDIN: CPT

## 2022-11-15 PROCEDURE — 84153 ASSAY OF PSA TOTAL: CPT

## 2022-11-15 PROCEDURE — 84270 ASSAY OF SEX HORMONE GLOBUL: CPT

## 2022-11-15 PROCEDURE — 80061 LIPID PANEL: CPT

## 2022-11-15 PROCEDURE — 84402 ASSAY OF FREE TESTOSTERONE: CPT

## 2022-11-15 PROCEDURE — 36415 COLL VENOUS BLD VENIPUNCTURE: CPT

## 2022-11-15 PROCEDURE — 82670 ASSAY OF TOTAL ESTRADIOL: CPT

## 2022-11-17 LAB
IGF-I SERPL-MCNC: 95 NG/ML (ref 56–203)
IGF-I Z-SCORE SERPL: -0.7
SHBG SERPL-SCNC: 25 NMOL/L (ref 19–76)
TESTOST FREE MFR SERPL: 2.4 % (ref 1.6–2.9)
TESTOST FREE SERPL-MCNC: 257 PG/ML (ref 47–244)
TESTOST SERPL-MCNC: 1056 NG/DL (ref 300–890)

## 2023-02-02 ENCOUNTER — HOSPITAL ENCOUNTER (OUTPATIENT)
Dept: RADIOLOGY | Facility: MEDICAL CENTER | Age: 58
End: 2023-02-02
Attending: FAMILY MEDICINE
Payer: COMMERCIAL

## 2023-02-02 DIAGNOSIS — I26.99 PULMONARY EMBOLISM, UNSPECIFIED CHRONICITY, UNSPECIFIED PULMONARY EMBOLISM TYPE, UNSPECIFIED WHETHER ACUTE COR PULMONALE PRESENT (HCC): ICD-10-CM

## 2023-02-02 PROCEDURE — 700117 HCHG RX CONTRAST REV CODE 255: Performed by: FAMILY MEDICINE

## 2023-02-02 PROCEDURE — 71275 CT ANGIOGRAPHY CHEST: CPT

## 2023-02-02 RX ADMIN — IOHEXOL 100 ML: 350 INJECTION, SOLUTION INTRAVENOUS at 15:30

## 2023-08-07 ENCOUNTER — HOSPITAL ENCOUNTER (OUTPATIENT)
Dept: LAB | Facility: MEDICAL CENTER | Age: 58
End: 2023-08-07
Attending: FAMILY MEDICINE
Payer: COMMERCIAL

## 2023-08-07 ENCOUNTER — HOSPITAL ENCOUNTER (OUTPATIENT)
Dept: LAB | Facility: MEDICAL CENTER | Age: 58
End: 2023-08-07
Attending: EMERGENCY MEDICINE
Payer: COMMERCIAL

## 2023-08-07 LAB
25(OH)D3 SERPL-MCNC: 29 NG/ML (ref 30–100)
ALBUMIN SERPL BCP-MCNC: 4.3 G/DL (ref 3.2–4.9)
ALBUMIN/GLOB SERPL: 1.1 G/DL
ALP SERPL-CCNC: 66 U/L (ref 30–99)
ALT SERPL-CCNC: 20 U/L (ref 2–50)
ANION GAP SERPL CALC-SCNC: 10 MMOL/L (ref 7–16)
APPEARANCE UR: ABNORMAL
AST SERPL-CCNC: 22 U/L (ref 12–45)
BACTERIA #/AREA URNS HPF: ABNORMAL /HPF
BASOPHILS # BLD AUTO: 0.8 % (ref 0–1.8)
BASOPHILS # BLD: 0.05 K/UL (ref 0–0.12)
BILIRUB SERPL-MCNC: 0.8 MG/DL (ref 0.1–1.5)
BILIRUB UR QL STRIP.AUTO: NEGATIVE
BUN SERPL-MCNC: 21 MG/DL (ref 8–22)
CALCIUM ALBUM COR SERPL-MCNC: 9.2 MG/DL (ref 8.5–10.5)
CALCIUM SERPL-MCNC: 9.4 MG/DL (ref 8.5–10.5)
CHLORIDE SERPL-SCNC: 102 MMOL/L (ref 96–112)
CHOLEST SERPL-MCNC: 167 MG/DL (ref 100–199)
CO2 SERPL-SCNC: 27 MMOL/L (ref 20–33)
COLOR UR: YELLOW
CORTIS SERPL-MCNC: 15.8 UG/DL (ref 0–23)
CREAT SERPL-MCNC: 1.21 MG/DL (ref 0.5–1.4)
CRP SERPL HS-MCNC: 7.8 MG/L (ref 0–3)
DHEA-S SERPL-MCNC: 170 UG/DL (ref 51.7–295)
EOSINOPHIL # BLD AUTO: 0.18 K/UL (ref 0–0.51)
EOSINOPHIL NFR BLD: 2.9 % (ref 0–6.9)
EPI CELLS #/AREA URNS HPF: ABNORMAL /HPF
ERYTHROCYTE [DISTWIDTH] IN BLOOD BY AUTOMATED COUNT: 51 FL (ref 35.9–50)
EST. AVERAGE GLUCOSE BLD GHB EST-MCNC: 123 MG/DL
ESTRADIOL SERPL-MCNC: 6.6 PG/ML
FASTING STATUS PATIENT QL REPORTED: NORMAL
GFR SERPLBLD CREATININE-BSD FMLA CKD-EPI: 69 ML/MIN/1.73 M 2
GLOBULIN SER CALC-MCNC: 4 G/DL (ref 1.9–3.5)
GLUCOSE SERPL-MCNC: 98 MG/DL (ref 65–99)
GLUCOSE UR STRIP.AUTO-MCNC: NEGATIVE MG/DL
HBA1C MFR BLD: 5.9 % (ref 4–5.6)
HCT VFR BLD AUTO: 53.3 % (ref 42–52)
HCYS SERPL-SCNC: 12.19 UMOL/L
HDLC SERPL-MCNC: 44 MG/DL
HGB BLD-MCNC: 17.4 G/DL (ref 14–18)
HYALINE CASTS #/AREA URNS LPF: ABNORMAL /LPF
IMM GRANULOCYTES # BLD AUTO: 0.02 K/UL (ref 0–0.11)
IMM GRANULOCYTES NFR BLD AUTO: 0.3 % (ref 0–0.9)
KETONES UR STRIP.AUTO-MCNC: NEGATIVE MG/DL
LDLC SERPL CALC-MCNC: 111 MG/DL
LEUKOCYTE ESTERASE UR QL STRIP.AUTO: ABNORMAL
LYMPHOCYTES # BLD AUTO: 1.37 K/UL (ref 1–4.8)
LYMPHOCYTES NFR BLD: 22 % (ref 22–41)
MCH RBC QN AUTO: 28.6 PG (ref 27–33)
MCHC RBC AUTO-ENTMCNC: 32.6 G/DL (ref 32.3–36.5)
MCV RBC AUTO: 87.7 FL (ref 81.4–97.8)
MICRO URNS: ABNORMAL
MONOCYTES # BLD AUTO: 0.72 K/UL (ref 0–0.85)
MONOCYTES NFR BLD AUTO: 11.6 % (ref 0–13.4)
NEUTROPHILS # BLD AUTO: 3.88 K/UL (ref 1.82–7.42)
NEUTROPHILS NFR BLD: 62.4 % (ref 44–72)
NITRITE UR QL STRIP.AUTO: POSITIVE
NRBC # BLD AUTO: 0 K/UL
NRBC BLD-RTO: 0 /100 WBC (ref 0–0.2)
PH UR STRIP.AUTO: 6 [PH] (ref 5–8)
PLATELET # BLD AUTO: 200 K/UL (ref 164–446)
PMV BLD AUTO: 10.1 FL (ref 9–12.9)
POTASSIUM SERPL-SCNC: 4.1 MMOL/L (ref 3.6–5.5)
PROT SERPL-MCNC: 8.3 G/DL (ref 6–8.2)
PROT UR QL STRIP: 30 MG/DL
PSA SERPL-MCNC: 1.11 NG/ML (ref 0–4)
RBC # BLD AUTO: 6.08 M/UL (ref 4.7–6.1)
RBC # URNS HPF: ABNORMAL /HPF
RBC UR QL AUTO: ABNORMAL
SODIUM SERPL-SCNC: 139 MMOL/L (ref 135–145)
SP GR UR STRIP.AUTO: 1.02
T3FREE SERPL-MCNC: 3.47 PG/ML (ref 2–4.4)
TRIGL SERPL-MCNC: 59 MG/DL (ref 0–149)
TSH SERPL DL<=0.005 MIU/L-ACNC: 1.7 UIU/ML (ref 0.38–5.33)
URATE SERPL-MCNC: 9 MG/DL (ref 2.5–8.3)
UROBILINOGEN UR STRIP.AUTO-MCNC: 1 MG/DL
WBC # BLD AUTO: 6.2 K/UL (ref 4.8–10.8)
WBC #/AREA URNS HPF: ABNORMAL /HPF

## 2023-08-07 PROCEDURE — 85025 COMPLETE CBC W/AUTO DIFF WBC: CPT

## 2023-08-07 PROCEDURE — 82306 VITAMIN D 25 HYDROXY: CPT

## 2023-08-07 PROCEDURE — 84481 FREE ASSAY (FT-3): CPT

## 2023-08-07 PROCEDURE — 86141 C-REACTIVE PROTEIN HS: CPT

## 2023-08-07 PROCEDURE — 84402 ASSAY OF FREE TESTOSTERONE: CPT

## 2023-08-07 PROCEDURE — 83090 ASSAY OF HOMOCYSTEINE: CPT

## 2023-08-07 PROCEDURE — 84550 ASSAY OF BLOOD/URIC ACID: CPT

## 2023-08-07 PROCEDURE — 83525 ASSAY OF INSULIN: CPT

## 2023-08-07 PROCEDURE — 82172 ASSAY OF APOLIPOPROTEIN: CPT

## 2023-08-07 PROCEDURE — 84403 ASSAY OF TOTAL TESTOSTERONE: CPT

## 2023-08-07 PROCEDURE — 84439 ASSAY OF FREE THYROXINE: CPT

## 2023-08-07 PROCEDURE — 81001 URINALYSIS AUTO W/SCOPE: CPT

## 2023-08-07 PROCEDURE — 84153 ASSAY OF PSA TOTAL: CPT

## 2023-08-07 PROCEDURE — 82670 ASSAY OF TOTAL ESTRADIOL: CPT

## 2023-08-07 PROCEDURE — 84443 ASSAY THYROID STIM HORMONE: CPT

## 2023-08-07 PROCEDURE — 80053 COMPREHEN METABOLIC PANEL: CPT

## 2023-08-07 PROCEDURE — 80061 LIPID PANEL: CPT

## 2023-08-07 PROCEDURE — 82627 DEHYDROEPIANDROSTERONE: CPT

## 2023-08-07 PROCEDURE — 83036 HEMOGLOBIN GLYCOSYLATED A1C: CPT

## 2023-08-07 PROCEDURE — 82533 TOTAL CORTISOL: CPT

## 2023-08-07 PROCEDURE — 36415 COLL VENOUS BLD VENIPUNCTURE: CPT

## 2023-08-07 PROCEDURE — 84270 ASSAY OF SEX HORMONE GLOBUL: CPT

## 2023-08-08 LAB
APO B100 SERPL-MCNC: 88 MG/DL (ref 55–140)
INSULIN P FAST SERPL-ACNC: 5 UIU/ML (ref 3–25)
SHBG SERPL-SCNC: 27 NMOL/L (ref 19–76)
TESTOST FREE MFR SERPL: 1.9 % (ref 1.6–2.9)
TESTOST FREE SERPL-MCNC: 34 PG/ML (ref 47–244)
TESTOST SERPL-MCNC: 179 NG/DL (ref 300–890)

## 2023-08-10 LAB — T4 FREE SERPL DIALY-MCNC: 1.9 NG/DL (ref 1.1–2.4)

## 2024-02-07 ENCOUNTER — HOSPITAL ENCOUNTER (OUTPATIENT)
Facility: MEDICAL CENTER | Age: 59
End: 2024-02-07
Attending: PHYSICIAN ASSISTANT
Payer: COMMERCIAL

## 2024-02-07 LAB
FERRITIN SERPL-MCNC: 67.5 NG/ML (ref 22–322)
IRON SATN MFR SERPL: 47 % (ref 15–55)
IRON SERPL-MCNC: 131 UG/DL (ref 50–180)
TIBC SERPL-MCNC: 278 UG/DL (ref 250–450)
UIBC SERPL-MCNC: 147 UG/DL (ref 110–370)

## 2024-02-07 PROCEDURE — 83540 ASSAY OF IRON: CPT

## 2024-02-07 PROCEDURE — 83550 IRON BINDING TEST: CPT

## 2024-02-07 PROCEDURE — 82728 ASSAY OF FERRITIN: CPT

## 2024-03-04 ENCOUNTER — HOSPITAL ENCOUNTER (OUTPATIENT)
Dept: RADIOLOGY | Facility: MEDICAL CENTER | Age: 59
End: 2024-03-04
Attending: FAMILY MEDICINE
Payer: COMMERCIAL

## 2024-03-04 DIAGNOSIS — I26.99 PULMONARY EMBOLISM, OTHER, UNSPECIFIED CHRONICITY, UNSPECIFIED WHETHER ACUTE COR PULMONALE PRESENT (HCC): ICD-10-CM

## 2024-03-04 PROCEDURE — 700117 HCHG RX CONTRAST REV CODE 255: Performed by: FAMILY MEDICINE

## 2024-03-04 PROCEDURE — 71275 CT ANGIOGRAPHY CHEST: CPT

## 2024-03-04 RX ADMIN — IOHEXOL 100 ML: 350 INJECTION, SOLUTION INTRAVENOUS at 14:35

## 2024-03-25 ENCOUNTER — HOSPITAL ENCOUNTER (OUTPATIENT)
Dept: RADIOLOGY | Facility: MEDICAL CENTER | Age: 59
End: 2024-03-25
Attending: FAMILY MEDICINE
Payer: COMMERCIAL

## 2024-03-25 DIAGNOSIS — I25.10 DISEASE OF CARDIOVASCULAR SYSTEM: ICD-10-CM

## 2024-03-25 PROCEDURE — 4410556 CT-CARDIAC SCORING (SELF PAY ONLY)

## 2024-03-26 ENCOUNTER — HOSPITAL ENCOUNTER (OUTPATIENT)
Dept: LAB | Facility: MEDICAL CENTER | Age: 59
End: 2024-03-26
Attending: FAMILY MEDICINE
Payer: COMMERCIAL

## 2024-03-26 LAB
ALBUMIN SERPL BCP-MCNC: 4.3 G/DL (ref 3.2–4.9)
ALBUMIN/GLOB SERPL: 1.3 G/DL
ALP SERPL-CCNC: 55 U/L (ref 30–99)
ALT SERPL-CCNC: 23 U/L (ref 2–50)
ANION GAP SERPL CALC-SCNC: 14 MMOL/L (ref 7–16)
AST SERPL-CCNC: 26 U/L (ref 12–45)
BASOPHILS # BLD AUTO: 0.7 % (ref 0–1.8)
BASOPHILS # BLD: 0.05 K/UL (ref 0–0.12)
BILIRUB SERPL-MCNC: 1.4 MG/DL (ref 0.1–1.5)
BUN SERPL-MCNC: 17 MG/DL (ref 8–22)
CALCIUM ALBUM COR SERPL-MCNC: 8.5 MG/DL (ref 8.5–10.5)
CALCIUM SERPL-MCNC: 8.7 MG/DL (ref 8.5–10.5)
CHLORIDE SERPL-SCNC: 103 MMOL/L (ref 96–112)
CHOLEST SERPL-MCNC: 177 MG/DL (ref 100–199)
CO2 SERPL-SCNC: 24 MMOL/L (ref 20–33)
CREAT SERPL-MCNC: 1.07 MG/DL (ref 0.5–1.4)
EOSINOPHIL # BLD AUTO: 0.26 K/UL (ref 0–0.51)
EOSINOPHIL NFR BLD: 3.7 % (ref 0–6.9)
ERYTHROCYTE [DISTWIDTH] IN BLOOD BY AUTOMATED COUNT: 55.5 FL (ref 35.9–50)
FASTING STATUS PATIENT QL REPORTED: NORMAL
GFR SERPLBLD CREATININE-BSD FMLA CKD-EPI: 80 ML/MIN/1.73 M 2
GLOBULIN SER CALC-MCNC: 3.3 G/DL (ref 1.9–3.5)
GLUCOSE SERPL-MCNC: 102 MG/DL (ref 65–99)
HCT VFR BLD AUTO: 53.2 % (ref 42–52)
HDLC SERPL-MCNC: 50 MG/DL
HGB BLD-MCNC: 17.6 G/DL (ref 14–18)
IMM GRANULOCYTES # BLD AUTO: 0.02 K/UL (ref 0–0.11)
IMM GRANULOCYTES NFR BLD AUTO: 0.3 % (ref 0–0.9)
LDLC SERPL CALC-MCNC: 110 MG/DL
LYMPHOCYTES # BLD AUTO: 1.87 K/UL (ref 1–4.8)
LYMPHOCYTES NFR BLD: 26.4 % (ref 22–41)
MCH RBC QN AUTO: 28.9 PG (ref 27–33)
MCHC RBC AUTO-ENTMCNC: 33.1 G/DL (ref 32.3–36.5)
MCV RBC AUTO: 87.2 FL (ref 81.4–97.8)
MONOCYTES # BLD AUTO: 0.84 K/UL (ref 0–0.85)
MONOCYTES NFR BLD AUTO: 11.9 % (ref 0–13.4)
NEUTROPHILS # BLD AUTO: 4.04 K/UL (ref 1.82–7.42)
NEUTROPHILS NFR BLD: 57 % (ref 44–72)
NRBC # BLD AUTO: 0 K/UL
NRBC BLD-RTO: 0 /100 WBC (ref 0–0.2)
PLATELET # BLD AUTO: 192 K/UL (ref 164–446)
PMV BLD AUTO: 10.4 FL (ref 9–12.9)
POTASSIUM SERPL-SCNC: 3.7 MMOL/L (ref 3.6–5.5)
PROT SERPL-MCNC: 7.6 G/DL (ref 6–8.2)
PSA SERPL-MCNC: 1.4 NG/ML (ref 0–4)
RBC # BLD AUTO: 6.1 M/UL (ref 4.7–6.1)
SODIUM SERPL-SCNC: 141 MMOL/L (ref 135–145)
TRIGL SERPL-MCNC: 83 MG/DL (ref 0–149)
TSH SERPL DL<=0.005 MIU/L-ACNC: 2.44 UIU/ML (ref 0.38–5.33)
URATE SERPL-MCNC: 9.1 MG/DL (ref 2.5–8.3)
WBC # BLD AUTO: 7.1 K/UL (ref 4.8–10.8)

## 2024-03-26 PROCEDURE — 85025 COMPLETE CBC W/AUTO DIFF WBC: CPT

## 2024-03-26 PROCEDURE — 36415 COLL VENOUS BLD VENIPUNCTURE: CPT

## 2024-03-26 PROCEDURE — 84153 ASSAY OF PSA TOTAL: CPT

## 2024-03-26 PROCEDURE — 80053 COMPREHEN METABOLIC PANEL: CPT

## 2024-03-26 PROCEDURE — 80061 LIPID PANEL: CPT

## 2024-03-26 PROCEDURE — 84550 ASSAY OF BLOOD/URIC ACID: CPT

## 2024-03-26 PROCEDURE — 84443 ASSAY THYROID STIM HORMONE: CPT

## 2024-04-18 ENCOUNTER — OFFICE VISIT (OUTPATIENT)
Dept: CARDIOLOGY | Facility: MEDICAL CENTER | Age: 59
End: 2024-04-18
Attending: INTERNAL MEDICINE
Payer: COMMERCIAL

## 2024-04-18 VITALS
BODY MASS INDEX: 35.96 KG/M2 | HEART RATE: 89 BPM | DIASTOLIC BLOOD PRESSURE: 70 MMHG | HEIGHT: 75 IN | RESPIRATION RATE: 16 BRPM | SYSTOLIC BLOOD PRESSURE: 120 MMHG | WEIGHT: 289.2 LBS | OXYGEN SATURATION: 95 %

## 2024-04-18 DIAGNOSIS — I25.10 CORONARY ARTERY CALCIFICATION SEEN ON CAT SCAN: ICD-10-CM

## 2024-04-18 DIAGNOSIS — E78.5 DYSLIPIDEMIA: ICD-10-CM

## 2024-04-18 DIAGNOSIS — R94.31 ABNORMAL EKG: ICD-10-CM

## 2024-04-18 PROCEDURE — 99204 OFFICE O/P NEW MOD 45 MIN: CPT | Performed by: INTERNAL MEDICINE

## 2024-04-18 PROCEDURE — 3078F DIAST BP <80 MM HG: CPT | Performed by: INTERNAL MEDICINE

## 2024-04-18 PROCEDURE — 99212 OFFICE O/P EST SF 10 MIN: CPT | Performed by: INTERNAL MEDICINE

## 2024-04-18 PROCEDURE — 93005 ELECTROCARDIOGRAM TRACING: CPT | Performed by: INTERNAL MEDICINE

## 2024-04-18 PROCEDURE — 3074F SYST BP LT 130 MM HG: CPT | Performed by: INTERNAL MEDICINE

## 2024-04-18 RX ORDER — ROSUVASTATIN CALCIUM 10 MG/1
10 TABLET, COATED ORAL EVERY EVENING
Qty: 90 TABLET | Refills: 3 | Status: SHIPPED | OUTPATIENT
Start: 2024-04-18

## 2024-04-18 RX ORDER — TRIAMCINOLONE ACETONIDE 1 MG/G
CREAM TOPICAL
COMMUNITY

## 2024-04-18 ASSESSMENT — ENCOUNTER SYMPTOMS
PALPITATIONS: 0
LOSS OF CONSCIOUSNESS: 0
INSOMNIA: 0
BLURRED VISION: 0
MYALGIAS: 0
SHORTNESS OF BREATH: 0
ABDOMINAL PAIN: 0
CHILLS: 0
PND: 0
DIZZINESS: 0
FEVER: 0
ORTHOPNEA: 0

## 2024-04-18 ASSESSMENT — FIBROSIS 4 INDEX: FIB4 SCORE: 1.67

## 2024-04-18 NOTE — PROGRESS NOTES
Chief Complaint   Patient presents with    Follow-Up     NP F/V DX : Left ventricular systolic dysfunction        Hypertension     Np F/V DX: Hypertension       Subjective     Parviz Stewart is a 58 y.o. male who presents today referred by his PCP Tam Rg MD for cardiology consultation for evaluation of elevated coronary calcium score of 700.    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    The patient had a follow-up chest CTA for his pulmonary embolus which showed resolution of the pulmonary embolus but showed coronary calcification.  He had a formal coronary calcium scan with an elevated score of 752.  The patient was referred to cardiology for further evaluation.    The patient has no prior history of coronary artery disease.  He had a remote coronary calcium score of 3 in 2011.  Started exercising in fact exercise on a treadmill for 45 minutes with no anginal chest pain or shortness of breath.  Continues to drink alcohol intermittently including this past weekend.  Never smoked cigarettes.  Remotely used cocaine and methamphetamine.  No IV drug use.  Has prediabetes mellitus.  Never been treated for hypercholesterolemia.  On testosterone and estrogen blockers per urology.  Family history of heart disease.  Significantly gained weight over the past 10 years well .  Went through divorce.    In 2016 the patient hospitalized at [facility] 12/12/2016-12/30/2016 for bilateral upper extremity numbness, shoulder and chest pain.  At that time the patient was drinking alcohol excessively including binge drinking.   Echocardiogram showed LVEF 45%.  MPI showed LVEF 48% with normal perfusion.  Seen by Tacho Rasmussen and MD [facility] Cardiologist who recommended medical therapy and that the patient completely abstain from alcohol..    In 2022 the patient was hospitalized at [facility] 9/90/2022-9/20/2022 for with complaints of tremors after stopping alcohol and hypertensive emergency with blood pressure of 208/123.  D-dimer was elevated 1.1 with hypoxemia room air O2 sat 88%.  Chest CTA showed right lower lobe pulmonary embolus.  Discharged on amlodipine, rivaroxaban and was to continue losartan and omeprazole.    Past Medical History:   Diagnosis Date    ASTHMA     ETOH abuse     Heart burn     Hypertension     pt states it is resolved after weight loss    Sleep apnea     Snoring      Past Surgical History:   Procedure Laterality Date    BICEPS TENDON REPAIR Right 6/22/2018    Procedure: BICEPS TENDON REPAIR - DISTAL;  Surgeon: Ebenezer Mejia M.D.;  Location: SURGERY [facility];  Service: Orthopedics    BHARATHI BY LAPAROSCOPY  2005    TONSILLECTOMY  1974     Family History   Problem Relation Age of Onset    Heart Disease Mother     Diabetes Mother     Kidney Disease Mother         on dialysis    Heart Disease Father     Other Father         mechanical injuries    Stroke Brother     Heart Disease Brother      Social History     Socioeconomic History    Marital status:      Spouse name: Not on file    Number of children: Not on file    Years of education: Not on file    Highest education level: Not on file   Occupational History    Not on file   Tobacco Use    Smoking status: Never    Smokeless tobacco: Never   Substance and Sexual Activity    Alcohol use: Yes     Alcohol/week: 12.0 oz     Types: 20 Shots of liquor per week    Drug use: Not Currently     Comment: denies    Sexual activity: Not on file   Other Topics Concern    Not on file   Social History Narrative    Not on file     Social Determinants of Health  "    Financial Resource Strain: Not on file   Food Insecurity: Not on file   Transportation Needs: Not on file   Physical Activity: Not on file   Stress: Not on file   Social Connections: Not on file   Intimate Partner Violence: Not on file   Housing Stability: Not on file     No Known Allergies  Outpatient Encounter Medications as of 4/18/2024   Medication Sig Dispense Refill    clomiPHENE (CLOMID) 50 MG tablet TAKE 1 TABLET BY MOUTH 2 TIMES PER WEEK      rosuvastatin (CRESTOR) 10 MG Tab Take 1 Tablet by mouth every evening. 90 Tablet 3    amLODIPine (NORVASC) 10 MG Tab Take 1 Tablet by mouth every day. 30 Tablet 3    magnesium oxide 400 (240 Mg) MG Tab Take 1 Tablet by mouth every day. 30 Tablet 3    losartan (COZAAR) 50 MG Tab Take 50 mg by mouth every day.      omeprazole (PRILOSEC) 20 MG delayed-release capsule Take 20 mg by mouth 1 time daily as needed.       No facility-administered encounter medications on file as of 4/18/2024.     Review of Systems   Constitutional:  Negative for chills and fever.   HENT:  Negative for congestion.    Eyes:  Negative for blurred vision.   Respiratory:  Negative for shortness of breath.    Cardiovascular:  Negative for chest pain, palpitations, orthopnea, leg swelling and PND.   Gastrointestinal:  Negative for abdominal pain.   Genitourinary:  Negative for dysuria.   Musculoskeletal:  Negative for joint pain and myalgias.   Skin:  Negative for rash.   Neurological:  Negative for dizziness and loss of consciousness.   Psychiatric/Behavioral:  The patient does not have insomnia.               Objective     /70 (BP Location: Left arm, Patient Position: Sitting, BP Cuff Size: Adult)   Pulse 89   Resp 16   Ht 1.905 m (6' 3\")   Wt (!) 131 kg (289 lb 3.2 oz)   SpO2 95%   BMI 36.15 kg/m²     Physical Exam  Vitals reviewed.   Constitutional:       General: He is not in acute distress.  Eyes:      Conjunctiva/sclera: Conjunctivae normal.      Pupils: Pupils are equal, round, " and reactive to light.   Neck:      Vascular: No JVD.   Cardiovascular:      Rate and Rhythm: Normal rate and regular rhythm.      Pulses:           Carotid pulses are 1+ on the right side and 1+ on the left side.       Radial pulses are 1+ on the right side and 1+ on the left side.        Posterior tibial pulses are 1+ on the right side and 1+ on the left side.      Heart sounds: Normal heart sounds. No murmur heard.     No friction rub. No gallop.   Pulmonary:      Effort: Pulmonary effort is normal. No accessory muscle usage or respiratory distress.      Breath sounds: Normal breath sounds. No wheezing or rales.   Abdominal:      General: There is no distension.      Palpations: Abdomen is soft. There is no mass.      Tenderness: There is no abdominal tenderness.   Musculoskeletal:      Cervical back: Normal range of motion and neck supple.      Right lower leg: No edema.      Left lower leg: No edema.   Skin:     General: Skin is warm and dry.      Findings: No rash.      Nails: There is no clubbing.   Neurological:      Mental Status: He is alert and oriented to person, place, and time.   Psychiatric:         Behavior: Behavior normal.              CARDIAC PET SCAN 4/70/2017  1.  Normal perfusion without evidence for prior infarction or ischemia.  2.  Calculated ejection fraction appears slightly low, however, on visual   inspection, it is estimated at 55%.    ECHOCARDIOGRAM 9/19/2022  The left ventricular ejection fraction is visually estimated to be 55%.  No regional wall motion abnormalities.  Mild regurgitation seen.  The ascending aorta is dilated with a diameter of 4.1 cm.    CARDIAC SCAN/20/2011  Coronary calcification:   LMA - 0   LAD - 1   LCX - 2   RCA - 0   PDA - 0   Calcium Score: 3    CARDIAC SCAN 3/25/2024  Coronary calcification:  LMA - 52.7  LCX - 100.1  LAD - 455.9  RCA - 144.0  PDA - 0.0  Total Calcium Score: 752.7    EKG/60/2023 normal sinus rhythm, rate 83, left anterior fascicular block,  personally interpreted    Assessment & Plan     1. Coronary artery calcification seen on CAT scan  EKG    [id number] PET W/CT ATTENUATION    rosuvastatin (CRESTOR) 10 MG Tab    LIPID PANEL      2. Abnormal EKG  [id number] PET W/CT ATTENUATION      3. Dyslipidemia  LIPID PANEL          Medical Decision Making: Today's Assessment/Status/Plan:   I reviewed all of the patient's diagnostic data showing him the coronary calcium scan images.  He is having no symptoms of ischemia or heart failure at this time.  Cardiac PET scan to rule out coronary obstruction.  No indication for invasive coronary angiography at this time.  Start aspirin 81 mg daily, the patient states that he is already done this.  Start rosuvastatin 10 mg daily with a targeted LDL of less than 55, reviewed and discussed the indications for this medication  Repeatedly recommended lifelong abstinence of alcohol and since he has been unable to achieve this recommended indefinite professional counseling and/or enrolling in alcoholic synonymous.  He has established an online Nutritional  Mabel Nunn MS, [facility] for weight loss.  Continue regular exercise program a minimum of 30 minutes daily.  I will make further recommendations based on the above evaluation    Thank you for allowing me to see this patient in consultation, if you have any questions please let me know.

## 2024-04-18 NOTE — PATIENT INSTRUCTIONS
Aspirin 81 mg daily  Start rosuvastatin (Crestor) 10 mg daily  Get blood work for cholesterol panel 1 month after starting rosuvastatin (Crestor)  Continue exercise program  Continue with nutritional [employer] for weight loss

## 2024-04-19 LAB — EKG IMPRESSION: NORMAL

## 2024-04-19 PROCEDURE — 93010 ELECTROCARDIOGRAM REPORT: CPT | Performed by: INTERNAL MEDICINE

## 2024-05-08 ENCOUNTER — APPOINTMENT (OUTPATIENT)
Dept: RADIOLOGY | Facility: MEDICAL CENTER | Age: 59
End: 2024-05-08
Attending: INTERNAL MEDICINE
Payer: COMMERCIAL

## 2024-05-24 ENCOUNTER — HOSPITAL ENCOUNTER (OUTPATIENT)
Dept: RADIOLOGY | Facility: MEDICAL CENTER | Age: 59
End: 2024-05-24
Attending: INTERNAL MEDICINE
Payer: COMMERCIAL

## 2024-05-24 DIAGNOSIS — R94.31 ABNORMAL EKG: ICD-10-CM

## 2024-05-24 DIAGNOSIS — I25.10 CORONARY ARTERY CALCIFICATION SEEN ON CAT SCAN: ICD-10-CM

## 2024-05-29 DIAGNOSIS — R93.1 ABNORMAL NUCLEAR CARDIAC IMAGING TEST: ICD-10-CM

## 2024-05-30 ENCOUNTER — TELEPHONE (OUTPATIENT)
Dept: CARDIOLOGY | Facility: MEDICAL CENTER | Age: 59
End: 2024-05-30
Payer: COMMERCIAL

## 2024-05-30 NOTE — TELEPHONE ENCOUNTER
S/W pt, he is asking for clarification from Dr. Estevez, needs to know if scheduled LHC procedure is necessary and what Dr. Estevez recommends. He reviewed the tele note from Dr. Estevez's conversation yesterday (see Cardiac PET note). Pt states that this note is not what was discussed yesterday. It seems that pt is pushing for testing to be done, but it is not really necessary. Pt would like feedback from SW either directly or relayed to RN. He feels that the notations suggest that Dr. Estevez does not feel the test is necessary, but pt is pushing for it.     Please advise.

## 2024-05-30 NOTE — RESULT ENCOUNTER NOTE
"I called and spoke with the patient about the results of his cardiac PET scan showing:   Moderate defect of mildly reduced uptake in the entire inferior wall which is more present in stress images suggestive of small scar with mild ischemia.  Normal left ventricular wall motion.  LV ejection fraction = 61%.  Normal coronary flow reserve.  Negative stress ECG for ischemia.  He is asymptomatic for ischemia working out every day with no chest pain symptoms or shortness of breath nonetheless he is exceedingly anxious and wishes to proceed immediately with the coronary angiogram having been told by friends that this is the only way to really determine any significant heart problems related to heart attacks and that all the other tests were \"scams\".  I reviewed with the patient the reason for the procedure, the procedure itself in addition to the potential risks which include but are not limited to death, myocardial infarction/heart attack,, stroke, bleeding, dye related kidney problems or allergic reaction and potential therapeutic scenarios of which which include only medical therapy which I emphasized, need for CABG either emergent or planned or coronary stent.  He expressed understanding was anxious to proceed    "

## 2024-05-30 NOTE — TELEPHONE ENCOUNTER
----- Message from Meño Ga Ass't sent at 2024  7:15 AM PDT -----  Regarding: FW: After Hours    ----- Message -----  From: Narda Harrington  Sent: 2024   6:07 AM PDT  To: Ml Joshua  Subject: After Hours                                      PATIENT NAME: Parviz Stewart  CALLER NAME: Self  REASON FOR CALL: Confusion. What you wrote in patients MyChart is different then what you two had discussed. Patient is requesting a return call regarding verification on what it is you want him to do.  PHONE NUMBER: [phone]  CARDIO PROVIDER: Dr. Estevez   : [id number]  MRN: [id number]

## 2024-06-10 ENCOUNTER — APPOINTMENT (OUTPATIENT)
Dept: ADMISSIONS | Facility: MEDICAL CENTER | Age: 59
End: 2024-06-10
Attending: INTERNAL MEDICINE
Payer: COMMERCIAL

## 2024-06-14 ENCOUNTER — PRE-ADMISSION TESTING (OUTPATIENT)
Dept: ADMISSIONS | Facility: MEDICAL CENTER | Age: 59
End: 2024-06-14
Attending: INTERNAL MEDICINE
Payer: COMMERCIAL

## 2024-06-14 RX ORDER — ASPIRIN 81 MG/1
81 TABLET ORAL DAILY
COMMUNITY

## 2024-06-17 ENCOUNTER — PRE-ADMISSION TESTING (OUTPATIENT)
Dept: ADMISSIONS | Facility: MEDICAL CENTER | Age: 59
End: 2024-06-17
Attending: INTERNAL MEDICINE
Payer: COMMERCIAL

## 2024-06-17 DIAGNOSIS — Z01.810 PRE-OPERATIVE CARDIOVASCULAR EXAMINATION: ICD-10-CM

## 2024-06-17 DIAGNOSIS — Z01.812 PRE-OPERATIVE LABORATORY EXAMINATION: ICD-10-CM

## 2024-06-17 LAB
ALBUMIN SERPL BCP-MCNC: 3.9 G/DL (ref 3.2–4.9)
ALBUMIN/GLOB SERPL: 1.2 G/DL
ALP SERPL-CCNC: 55 U/L (ref 30–99)
ALT SERPL-CCNC: 33 U/L (ref 2–50)
ANION GAP SERPL CALC-SCNC: 13 MMOL/L (ref 7–16)
APTT PPP: 28.2 SEC (ref 24.7–36)
AST SERPL-CCNC: 24 U/L (ref 12–45)
BILIRUB SERPL-MCNC: 0.6 MG/DL (ref 0.1–1.5)
BUN SERPL-MCNC: 19 MG/DL (ref 8–22)
CALCIUM ALBUM COR SERPL-MCNC: 9.4 MG/DL (ref 8.5–10.5)
CALCIUM SERPL-MCNC: 9.3 MG/DL (ref 8.5–10.5)
CHLORIDE SERPL-SCNC: 102 MMOL/L (ref 96–112)
CO2 SERPL-SCNC: 24 MMOL/L (ref 20–33)
CREAT SERPL-MCNC: 0.86 MG/DL (ref 0.5–1.4)
EKG IMPRESSION: NORMAL
ERYTHROCYTE [DISTWIDTH] IN BLOOD BY AUTOMATED COUNT: 45.9 FL (ref 35.9–50)
GFR SERPLBLD CREATININE-BSD FMLA CKD-EPI: 99 ML/MIN/1.73 M 2
GLOBULIN SER CALC-MCNC: 3.2 G/DL (ref 1.9–3.5)
GLUCOSE SERPL-MCNC: 106 MG/DL (ref 65–99)
HCT VFR BLD AUTO: 48.5 % (ref 42–52)
HGB BLD-MCNC: 16.9 G/DL (ref 14–18)
INR PPP: 0.99 (ref 0.87–1.13)
MCH RBC QN AUTO: 31.1 PG (ref 27–33)
MCHC RBC AUTO-ENTMCNC: 34.8 G/DL (ref 32.3–36.5)
MCV RBC AUTO: 89.2 FL (ref 81.4–97.8)
PLATELET # BLD AUTO: 195 K/UL (ref 164–446)
PMV BLD AUTO: 9.5 FL (ref 9–12.9)
POTASSIUM SERPL-SCNC: 4.1 MMOL/L (ref 3.6–5.5)
PROT SERPL-MCNC: 7.1 G/DL (ref 6–8.2)
PROTHROMBIN TIME: 13.2 SEC (ref 12–14.6)
RBC # BLD AUTO: 5.44 M/UL (ref 4.7–6.1)
SODIUM SERPL-SCNC: 139 MMOL/L (ref 135–145)
WBC # BLD AUTO: 7.9 K/UL (ref 4.8–10.8)

## 2024-06-17 PROCEDURE — 85610 PROTHROMBIN TIME: CPT

## 2024-06-17 PROCEDURE — 93005 ELECTROCARDIOGRAM TRACING: CPT

## 2024-06-17 PROCEDURE — 80053 COMPREHEN METABOLIC PANEL: CPT

## 2024-06-17 PROCEDURE — 85730 THROMBOPLASTIN TIME PARTIAL: CPT

## 2024-06-17 PROCEDURE — 85027 COMPLETE CBC AUTOMATED: CPT

## 2024-06-17 PROCEDURE — 93010 ELECTROCARDIOGRAM REPORT: CPT | Performed by: INTERNAL MEDICINE

## 2024-06-17 PROCEDURE — 36415 COLL VENOUS BLD VENIPUNCTURE: CPT

## 2024-06-20 ENCOUNTER — HOSPITAL ENCOUNTER (OUTPATIENT)
Facility: MEDICAL CENTER | Age: 59
End: 2024-06-20
Attending: INTERNAL MEDICINE | Admitting: INTERNAL MEDICINE
Payer: COMMERCIAL

## 2024-06-20 ENCOUNTER — APPOINTMENT (OUTPATIENT)
Dept: CARDIOLOGY | Facility: MEDICAL CENTER | Age: 59
End: 2024-06-20
Attending: INTERNAL MEDICINE
Payer: COMMERCIAL

## 2024-06-20 VITALS
TEMPERATURE: 97.1 F | WEIGHT: 276.24 LBS | RESPIRATION RATE: 16 BRPM | SYSTOLIC BLOOD PRESSURE: 138 MMHG | DIASTOLIC BLOOD PRESSURE: 88 MMHG | OXYGEN SATURATION: 94 % | BODY MASS INDEX: 34.35 KG/M2 | HEART RATE: 62 BPM | HEIGHT: 75 IN

## 2024-06-20 DIAGNOSIS — R93.1 ABNORMAL NUCLEAR CARDIAC IMAGING TEST: ICD-10-CM

## 2024-06-20 LAB — ACT BLD: 342 SEC (ref 74–137)

## 2024-06-20 PROCEDURE — 700111 HCHG RX REV CODE 636 W/ 250 OVERRIDE (IP)

## 2024-06-20 PROCEDURE — 93458 L HRT ARTERY/VENTRICLE ANGIO: CPT | Mod: 26 | Performed by: INTERNAL MEDICINE

## 2024-06-20 PROCEDURE — C1769 GUIDE WIRE: HCPCS

## 2024-06-20 PROCEDURE — 700117 HCHG RX CONTRAST REV CODE 255: Performed by: INTERNAL MEDICINE

## 2024-06-20 PROCEDURE — 160035 HCHG PACU - 1ST 60 MINS PHASE I

## 2024-06-20 PROCEDURE — 160046 HCHG PACU - 1ST 60 MINS PHASE II

## 2024-06-20 PROCEDURE — 160002 HCHG RECOVERY MINUTES (STAT)

## 2024-06-20 PROCEDURE — 700101 HCHG RX REV CODE 250

## 2024-06-20 PROCEDURE — 85347 COAGULATION TIME ACTIVATED: CPT

## 2024-06-20 PROCEDURE — 160036 HCHG PACU - EA ADDL 30 MINS PHASE I

## 2024-06-20 PROCEDURE — 99152 MOD SED SAME PHYS/QHP 5/>YRS: CPT | Performed by: INTERNAL MEDICINE

## 2024-06-20 RX ORDER — HEPARIN SODIUM 200 [USP'U]/100ML
INJECTION, SOLUTION INTRAVENOUS
Status: COMPLETED
Start: 2024-06-20 | End: 2024-06-20

## 2024-06-20 RX ORDER — HEPARIN SODIUM 1000 [USP'U]/ML
INJECTION, SOLUTION INTRAVENOUS; SUBCUTANEOUS
Status: COMPLETED
Start: 2024-06-20 | End: 2024-06-20

## 2024-06-20 RX ORDER — SODIUM CHLORIDE 9 MG/ML
1.5 INJECTION, SOLUTION INTRAVENOUS CONTINUOUS
Status: DISCONTINUED | OUTPATIENT
Start: 2024-06-20 | End: 2024-06-20 | Stop reason: HOSPADM

## 2024-06-20 RX ORDER — LIDOCAINE HYDROCHLORIDE 20 MG/ML
INJECTION, SOLUTION INFILTRATION; PERINEURAL
Status: COMPLETED
Start: 2024-06-20 | End: 2024-06-20

## 2024-06-20 RX ORDER — MIDAZOLAM HYDROCHLORIDE 1 MG/ML
INJECTION INTRAMUSCULAR; INTRAVENOUS
Status: COMPLETED
Start: 2024-06-20 | End: 2024-06-20

## 2024-06-20 RX ORDER — DIPHENHYDRAMINE HYDROCHLORIDE 50 MG/ML
INJECTION INTRAMUSCULAR; INTRAVENOUS
Status: COMPLETED
Start: 2024-06-20 | End: 2024-06-20

## 2024-06-20 RX ORDER — VERAPAMIL HYDROCHLORIDE 2.5 MG/ML
INJECTION, SOLUTION INTRAVENOUS
Status: COMPLETED
Start: 2024-06-20 | End: 2024-06-20

## 2024-06-20 RX ADMIN — MIDAZOLAM HYDROCHLORIDE 2 MG: 1 INJECTION, SOLUTION INTRAMUSCULAR; INTRAVENOUS at 09:57

## 2024-06-20 RX ADMIN — VERAPAMIL HYDROCHLORIDE 2.5 MG: 2.5 INJECTION, SOLUTION INTRAVENOUS at 09:28

## 2024-06-20 RX ADMIN — HEPARIN SODIUM: 1000 INJECTION, SOLUTION INTRAVENOUS; SUBCUTANEOUS at 09:28

## 2024-06-20 RX ADMIN — HEPARIN SODIUM 10000 UNITS: 1000 INJECTION, SOLUTION INTRAVENOUS; SUBCUTANEOUS at 09:57

## 2024-06-20 RX ADMIN — IOHEXOL 54 ML: 350 INJECTION, SOLUTION INTRAVENOUS at 10:00

## 2024-06-20 RX ADMIN — NITROGLYCERIN 10 ML: 20 INJECTION INTRAVENOUS at 09:28

## 2024-06-20 RX ADMIN — FENTANYL CITRATE 100 MCG: 50 INJECTION, SOLUTION INTRAMUSCULAR; INTRAVENOUS at 09:57

## 2024-06-20 RX ADMIN — HEPARIN SODIUM 2000 UNITS: 200 INJECTION, SOLUTION INTRAVENOUS at 09:28

## 2024-06-20 RX ADMIN — LIDOCAINE HYDROCHLORIDE: 20 INJECTION, SOLUTION INFILTRATION; PERINEURAL at 09:28

## 2024-06-20 ASSESSMENT — FIBROSIS 4 INDEX: FIB4 SCORE: 1.26

## 2024-06-20 NOTE — DISCHARGE INSTRUCTIONS
POST ANGIOGRAM  General Care Instructions  Maintain a bandage over the incision site for 24 hours.  It's normal to find a small bruise or dime-sized lump at the insertion site. This should disappear within a few weeks.  Do not apply lotions or powders to the site.  Do not immerse the catheter insertion site in water (bathtub/swimming) for five days. It is ok to shower 24 hours after the procedure.  You may resume your normal diet immediately; on the day of your procedure, drink 6-10 glasses of water to help flush the contrast liquid out of your system.  If the doctor inserted the catheter in your arm:  For 3 days, DO NOT lift anything heavier than 10 pounds (approximately a gallon of milk). DO NOT do any heavy pushing, pulling, or twisting.    Medications  If your current medications need to be changed, you will be provided with an updated list of your medications prior to discharge.  If you take warfarin (Coumadin), resume taking your usual dose the evening after the procedure.  DO NOT STOP taking prescribed blood thinning (anti-platelet) medications unless instructed by your cardiologist.  These medications include:  Aspirin, Clopidogrel (Plavix), Ticagrelor (Brilinta), or Prasugrel (Effient)   If you take one of the following anticoagulants, RESUME 24 HOURS after your procedure:  Apixiban (Eliquis), Rivaroxaban (Xarelto), Dabigatran (Pradaxa), Edoxaban (Savaysa)  If you take metformin (Glucophage), RESUME 48 HOURS after your procedure.    When to call your healthcare provider  Call your cardiologist right away at [phone] if you have any of the following:   Problems/Concerns taking any of your prescribed heart medicines.   The insertion site has increasing pain, swelling, redness, bleeding, or drainage.   Your arm or leg below where the insertion site changes color, is cool, or is numb.   You have chest pain or shortness of breath that does not go away with rest.   Your pulse feels irregular -- very slow  (less than 60 beats/minute) or very fast (over 100 beats/minute).   You have dizziness, fainting, or you are very tired.   You are coughing up blood or yellow or green mucus.   You have chills or a fever over 101°F (38.3°C).    If there is bleeding at the catheter insertion site, apply pressure for 10 minutes.  If bleeding persists, call 911, and continue to hold pressure until advanced medical support arrives.        Exercising Safely After Percutaneous Coronary Intervention (PCI)  After percutaneous coronary intervention (PCI), which involves angioplasty and often stenting, it's important to focus on your heart health. Exercise can help strengthen your heart. It can also help you feel good and improve your overall health. Talk with your health care provider or cardiac rehab team member about good options for you.  Start slowly. Work up to more vigorous exercise as you get stronger. Aim for at least 150 minutes of exercise each week.  Include aerobic activities. These make the heart beat faster. They work the heart and lungs, and improve the body's ability to use oxygen. Good choices include walking, swimming, and biking .  Always follow your doctor's recommendation for exercise.   You have been referred to cardiac rehabilitation, which is important for your recovery.  You may contact [facility] at [phone] to learn more and schedule a visit.        Lifestyle Management After Percutaneous Coronary Intervention (PCI)  Percutaneous coronary intervention (PCI)  involves angioplasty and often stenting. This procedure can open arteries and relieve symptoms. But, it doesn't cure coronary artery disease. New blockages can still form. You need to take steps to prevent this by managing risk factors. Doing so will help make your heart and arteries healthier. Your doctor may prescribe cardiac rehabilitation to help with this lifelong process.  Understanding risk factors  Some risk factors for  coronary artery disease can be controlled. These include smoking, high blood pressure, cholesterol, diabetes, and obesity. They can be managed with medication, diet, and exercise. Support and counseling can also play a role. The effort will pay off! Managing risk factors can help you be more active, feel better, and reduce the risk of heart attack.    If you smoke, quit!  If your doctor has been urging you to quit smoking, it's for good reasons. Smoking damages your heart, blood vessels, and lungs. The good news is that quitting can halt or even reverse the damage of smoking. To quit now:  Get medical help. Ask your doctor for advice on stop-smoking programs. Also ask about medications or nicotine replacement therapy products that may help you quit smoking.  Get support. Join a support group. Ask for help from your family and friends.  Don't give up. It often takes several tries to succeed in quitting smoking.  Avoid secondhand smoke. Ask family and friends not to smoke around you.    What to Expect Post Anesthesia    Rest and take it easy for the first 24 hours.  A responsible adult is recommended to remain with you during that time.  It is normal to feel sleepy.  We encourage you to not do anything that requires balance, judgment or coordination.    FOR 24 HOURS DO NOT:  Drive, operate machinery or run household appliances.  Drink beer or alcoholic beverages.  Make important decisions or sign legal documents.    To avoid nausea, slowly advance diet as tolerated, avoiding spicy or greasy foods for the first day.  Add more substantial food to your diet according to your provider's instructions.  Babies can be fed formula or breast milk as soon as they are hungry.  INCREASE FLUIDS AND FIBER TO AVOID CONSTIPATION.    MILD FLU-LIKE SYMPTOMS ARE NORMAL.  YOU MAY EXPERIENCE GENERALIZED MUSCLE ACHES, THROAT IRRITATION, HEADACHE AND/OR SOME NAUSEA.    If any questions arise, call your provider [phone].  If your  provider is not available, please feel free to call the [facility] at [phone]    MEDICATIONS: Resume taking daily medication.  Take prescribed pain medication with food.  If no medication is prescribed, you may take non-aspirin pain medication if needed.  PAIN MEDICATION CAN BE VERY CONSTIPATING.  Take a stool softener or laxative such as senokot, pericolace, or milk of magnesia if needed.    Last pain medication given at ______________________

## 2024-06-20 NOTE — OR NURSING
1017 pt arrived from cath lab, report received. Pt awake, VSS on monitor. TR band to right wrist clean dry and soft.     1020 provided with water, tolerating well. Called pt wife to update on recovery.     1027 wife to bedside.     1115 2 ml removed from TR band. No bleeding, site cdi and soft    1120 report to Jeannie EMERSON for break.       1210 3 ml removed from TR band. No bleeding, site cdi and soft    1230 3 ml removed from TR band, no bleeding site cdi and soft. Discharged instructions discussed, all questions answered.     1245 remaining air removed from TR band. No bleeding noted. Gauze and Tegaderm dressing placed. Pt dressing independently.     1250 IV removed pt d/c to home with family escorted out by staff, all belongings sent with pt.

## 2024-06-20 NOTE — PROGRESS NOTES
"Interventional cardiology consultation     Requesting Provider: Woodrow Vazquez M.D.  Reason for consultation: heart catheterization    Impressions:  #.  Abnormal stress test with inferior ischemia  #.  Abnormal coronary calcium score  #.  History of alcohol mediated cardiomyopathy, LVEF recovered to 55%  #.  History of pulmonary embolism  #.  Family history of coronary artery disease    Recommendations:  Proceed with cardiac cath, possible PCI.  Discussed risk benefits and alternatives-patient agreeable to proceeding.    Continue Crestor and aspirin    History: Milton Stewart is a 58 y.o. male with a past medical history of  pulmonary embolism and recovered LV systolic dysfunction related to alcohol  who I have been consulted regarding cardiac catheterization.  The patient has been identified as having an abnormal coronary calcium score and subsequent MPI showed inferior ischemia.  He is very anxious about these findings, eager to embark on his next decade of life free of cardiovascular disease which has plagued his family members.  He does not have any angina.  He expresses strong preference for revascularization if feasible.  This preference persists following discussion of known benefits as well as unknowns regarding coronary revascularization.    ROS:   10 point review systems is otherwise negative except as per the HPI    PE:  BP (!) 147/83   Pulse 74   Temp 36.1 °C (97 °F)   Resp 16   Ht 1.905 m (6' 3\")   Wt (!) 125 kg (276 lb 3.8 oz)   SpO2 95%   BMI 34.53 kg/m²   GEN: NAD  RESP: CTAB  CVS: RRR, No M/R/G  ABD: Soft, NT/ND  EXT: WWP, no edema    Studies interpreted by me: ECG: Sinus, first-degree AV block, left atrial enlargement, leftward axis-borderline LAFB criteria    Past Medical History:   Diagnosis Date    ASTHMA 06/14/2024    allergen induced    Bronchitis 06/14/2024    history of    Cataract 06/14/2024    iolou    ETOH abuse     Heart burn     High cholesterol     Hypertension     " pt states it is resolved after weight loss     Past Surgical History:   Procedure Laterality Date    OTHER Bilateral 06/14/2024    IOLOU    BICEPS TENDON REPAIR Right 06/22/2018    Procedure: BICEPS TENDON REPAIR - DISTAL;  Surgeon: Ebenezer Mejia M.D.;  Location: SURGERY [facility];  Service: Orthopedics    BHARATHI BY LAPAROSCOPY  01/01/2005    TONSILLECTOMY  01/01/1974     Allergies   Allergen Reactions    Pollen Extract Shortness of Breath     No current facility-administered medications for this encounter.  Medications Prior to Admission   Medication Sig Dispense Refill Last Dose    Non Formulary Request Take  by mouth every day. NAC antioxidant   6/19/2024 at pm    aspirin 81 MG EC tablet Take 81 mg by mouth every day.   6/20/2024 at 0530    OMEGA-3 FATTY ACIDS PO Take  by mouth every day.   6/19/2024 at pm    multivitamin Tab Take 1 Tablet by mouth every day.   6/19/2024 at pm    Non Formulary Request Take  by mouth every day. Balance of nature, fruits and vegetables   6/19/2024 at pm    Coenzyme Q10 (COQ10 PO) Take  by mouth every day.   6/19/2024 at pm    VITAMIN D PO Take  by mouth every day. With K1, K2   6/19/2024 at am    Multiple Vitamins-Minerals (HAIR SKIN & NAILS PO) Take  by mouth every day.   6/19/2024 at pm    multivitamin Tab Take 1 Tablet by mouth every day. Doesn't have copper or iron   6/19/2024 at pm    Non Formulary Request Take  by mouth every day. Total beets   6/19/2024 at pm    CREATINE PO Take  by mouth every day.   6/19/2024 at am    rosuvastatin (CRESTOR) 10 MG Tab Take 1 Tablet by mouth every evening. 90 Tablet 3 6/19/2024 at pm    amLODIPine (NORVASC) 10 MG Tab Take 1 Tablet by mouth every day. (Patient taking differently: Take 10 mg by mouth at bedtime.) 30 Tablet 3 6/20/2024 at 0530    losartan (COZAAR) 50 MG Tab Take 100 mg by mouth every day.   6/19/2024 at pm    ALBUTEROL INH Inhale as needed.   >30 days    clomiPHENE (CLOMID) 50 MG tablet TAKE 1 TABLET BY MOUTH 2 TIMES  PER WEEK (Patient not taking: Reported on 6/14/2024)   6/6/2024    omeprazole (PRILOSEC) 20 MG delayed-release capsule Take 20 mg by mouth 1 time daily as needed.   6/18/2024      Social History     Socioeconomic History    Marital status:      Spouse name: Not on file    Number of children: Not on file    Years of education: Not on file    Highest education level: Not on file   Occupational History    Not on file   Tobacco Use    Smoking status: Never    Smokeless tobacco: Never   Vaping Use    Vaping status: Never Used   Substance and Sexual Activity    Alcohol use: Yes     Alcohol/week: 4.8 - 6.0 oz     Types: 5 - 6 Glasses of wine, 3 - 4 Shots of liquor per week    Drug use: Not Currently     Comment: denies    Sexual activity: Not on file   Other Topics Concern    Not on file   Social History Narrative    Not on file     Social Determinants of Health     Financial Resource Strain: Not on file   Food Insecurity: Not on file   Transportation Needs: Not on file   Physical Activity: Not on file   Stress: Not on file   Social Connections: Not on file   Intimate Partner Violence: Not on file   Housing Stability: Not on file     Family History   Problem Relation Age of Onset    Heart Disease Mother     Diabetes Mother     Kidney Disease Mother         on dialysis    Heart Disease Father     Other Father         mechanical injuries    Stroke Brother     Heart Disease Brother           Studies  Lab Results   Component Value Date/Time    CHOLSTRLTOT 177 03/26/2024 06:25 AM     (H) 03/26/2024 06:25 AM    HDL 50 03/26/2024 06:25 AM    TRIGLYCERIDE 83 03/26/2024 06:25 AM       Lab Results   Component Value Date/Time    SODIUM 139 06/17/2024 01:08 PM    POTASSIUM 4.1 06/17/2024 01:08 PM    CHLORIDE 102 06/17/2024 01:08 PM    CO2 24 06/17/2024 01:08 PM    GLUCOSE 106 (H) 06/17/2024 01:08 PM    BUN 19 06/17/2024 01:08 PM    CREATININE 0.86 06/17/2024 01:08 PM      Lab Results   Component Value Date/Time     PROTHROMBTM 13.2 06/17/2024 01:08 PM    INR 0.99 06/17/2024 01:08 PM      Lab Results   Component Value Date/Time    WBC 7.9 06/17/2024 01:08 PM    RBC 5.44 06/17/2024 01:08 PM    HEMOGLOBIN 16.9 06/17/2024 01:08 PM    HEMATOCRIT 48.5 06/17/2024 01:08 PM    MCV 89.2 06/17/2024 01:08 PM    MCH 31.1 06/17/2024 01:08 PM    MCHC 34.8 06/17/2024 01:08 PM    MPV 9.5 06/17/2024 01:08 PM    NEUTSPOLYS 57.00 03/26/2024 06:25 AM    LYMPHOCYTES 26.40 03/26/2024 06:25 AM    MONOCYTES 11.90 03/26/2024 06:25 AM    EOSINOPHILS 3.70 03/26/2024 06:25 AM    BASOPHILS 0.70 03/26/2024 06:25 AM

## 2024-06-20 NOTE — PROCEDURES
"CARDIAC CATHETERIZATION REPORT    REFERRING: Bebeto Estevez M.D.    PROCEDURE PHYSICIAN: Woodrow Vazquez MD, [facility], [facility]  ASSISTANT: None    IMPRESSIONS:  1.  False positive stress test  2.  Nonobstructive coronary atherosclerosis  3.  Normal LVEDP  4.  Low normal LVEF    Recommendations:  Usual post cath care    Pre-procedure diagnosis: Abnormal stress test  Post-procedure diagnosis: False positive stress test    Procedure performed  Selective coronary angiography  Left heart catheterization    Conscious sedation was supervised by myself and administered by trained personnel using fentanyl and versed between 942 and 1001. The patient tolerated sedation without complication.     Procedure Description  1. Access: 5/6 Japanese right radial artery Micropuncture technique was utilized following local anesthesia with lidocaine.  A radial cocktail containing verapamil and saline was administered in the radial artery sheath    2. Diagnostic description: The catheter was passed to the central circulation with the aide of J tipped 0.35\" wire. A 5F TIG 4.0 and 6F Pigtail were used to inject the coronary circulation and inject the left ventricle during invasive hemodynamic monitoring.     3. Closing: At completion of the procedure the relevant equipment was removed from the body and hemostasis achieved by Radial band    Findings   Hemodynamics:   Aorta: 99/81 mmHg  LVEDP: 15 mmHg  No significant pullback gradient across the aortic valve    Coronary Anatomy   Left Main: Minimal luminal irregularities   LAD:  40% stenosis in the mid segment   LCx: Minimal luminal irregularities   RCA: Dominant, Minimal luminal irregularities     Left Ventriculography:   LVEF= 50%. Normal wall motion    Technical Factors  1. Complications: None  2. Estimated Blood Loss: <50 cc  3. Specimens: None  4. Contrast Volume: 54 ml  5. Medications: Radial cocktail (Verapamil 2.5 mg, Nitroglycerin 100 mcg) Heparin 75838 units  6. Radiation (air kerma): " 259 mGy

## 2024-07-05 ENCOUNTER — HOSPITAL ENCOUNTER (OUTPATIENT)
Dept: LAB | Facility: MEDICAL CENTER | Age: 59
End: 2024-07-05
Attending: FAMILY MEDICINE
Payer: COMMERCIAL

## 2024-07-05 LAB
BASOPHILS # BLD AUTO: 0.7 % (ref 0–1.8)
BASOPHILS # BLD: 0.06 K/UL (ref 0–0.12)
CHOLEST SERPL-MCNC: 148 MG/DL (ref 100–199)
EOSINOPHIL # BLD AUTO: 0.2 K/UL (ref 0–0.51)
EOSINOPHIL NFR BLD: 2.5 % (ref 0–6.9)
ERYTHROCYTE [DISTWIDTH] IN BLOOD BY AUTOMATED COUNT: 48.8 FL (ref 35.9–50)
EST. AVERAGE GLUCOSE BLD GHB EST-MCNC: 114 MG/DL
GLUCOSE SERPL-MCNC: 97 MG/DL (ref 65–99)
HBA1C MFR BLD: 5.6 % (ref 4–5.6)
HCT VFR BLD AUTO: 52.6 % (ref 42–52)
HDLC SERPL-MCNC: 54 MG/DL
HGB BLD-MCNC: 17.5 G/DL (ref 14–18)
IMM GRANULOCYTES # BLD AUTO: 0.02 K/UL (ref 0–0.11)
IMM GRANULOCYTES NFR BLD AUTO: 0.2 % (ref 0–0.9)
LDLC SERPL CALC-MCNC: 82 MG/DL
LYMPHOCYTES # BLD AUTO: 1.91 K/UL (ref 1–4.8)
LYMPHOCYTES NFR BLD: 23.6 % (ref 22–41)
MCH RBC QN AUTO: 30.5 PG (ref 27–33)
MCHC RBC AUTO-ENTMCNC: 33.3 G/DL (ref 32.3–36.5)
MCV RBC AUTO: 91.8 FL (ref 81.4–97.8)
MONOCYTES # BLD AUTO: 0.78 K/UL (ref 0–0.85)
MONOCYTES NFR BLD AUTO: 9.6 % (ref 0–13.4)
NEUTROPHILS # BLD AUTO: 5.14 K/UL (ref 1.82–7.42)
NEUTROPHILS NFR BLD: 63.4 % (ref 44–72)
NRBC # BLD AUTO: 0 K/UL
NRBC BLD-RTO: 0 /100 WBC (ref 0–0.2)
PLATELET # BLD AUTO: 169 K/UL (ref 164–446)
PMV BLD AUTO: 10.1 FL (ref 9–12.9)
RBC # BLD AUTO: 5.73 M/UL (ref 4.7–6.1)
TRIGL SERPL-MCNC: 62 MG/DL (ref 0–149)
URATE SERPL-MCNC: 5.4 MG/DL (ref 2.5–8.3)
WBC # BLD AUTO: 8.1 K/UL (ref 4.8–10.8)

## 2024-07-05 PROCEDURE — 36415 COLL VENOUS BLD VENIPUNCTURE: CPT

## 2024-07-05 PROCEDURE — 84550 ASSAY OF BLOOD/URIC ACID: CPT

## 2024-07-05 PROCEDURE — 83036 HEMOGLOBIN GLYCOSYLATED A1C: CPT

## 2024-07-05 PROCEDURE — 82947 ASSAY GLUCOSE BLOOD QUANT: CPT

## 2024-07-05 PROCEDURE — 85025 COMPLETE CBC W/AUTO DIFF WBC: CPT

## 2024-07-05 PROCEDURE — 80061 LIPID PANEL: CPT

## 2024-08-07 ENCOUNTER — OFFICE VISIT (OUTPATIENT)
Dept: CARDIOLOGY | Facility: MEDICAL CENTER | Age: 59
End: 2024-08-07
Attending: INTERNAL MEDICINE
Payer: COMMERCIAL

## 2024-08-07 VITALS
RESPIRATION RATE: 16 BRPM | DIASTOLIC BLOOD PRESSURE: 80 MMHG | WEIGHT: 279 LBS | SYSTOLIC BLOOD PRESSURE: 124 MMHG | HEART RATE: 88 BPM | HEIGHT: 75 IN | BODY MASS INDEX: 34.69 KG/M2 | OXYGEN SATURATION: 94 %

## 2024-08-07 DIAGNOSIS — I25.10 CORONARY ARTERY DISEASE, NON-OCCLUSIVE: ICD-10-CM

## 2024-08-07 DIAGNOSIS — E78.5 DYSLIPIDEMIA: ICD-10-CM

## 2024-08-07 DIAGNOSIS — I42.7 TOXIC CARDIOMYOPATHY (HCC): ICD-10-CM

## 2024-08-07 DIAGNOSIS — I10 PRIMARY HYPERTENSION: ICD-10-CM

## 2024-08-07 DIAGNOSIS — E66.9 OBESITY (BMI 30-39.9): ICD-10-CM

## 2024-08-07 PROCEDURE — 99213 OFFICE O/P EST LOW 20 MIN: CPT | Performed by: INTERNAL MEDICINE

## 2024-08-07 PROCEDURE — 99214 OFFICE O/P EST MOD 30 MIN: CPT | Performed by: INTERNAL MEDICINE

## 2024-08-07 PROCEDURE — 3074F SYST BP LT 130 MM HG: CPT | Performed by: INTERNAL MEDICINE

## 2024-08-07 PROCEDURE — 3079F DIAST BP 80-89 MM HG: CPT | Performed by: INTERNAL MEDICINE

## 2024-08-07 RX ORDER — ROSUVASTATIN CALCIUM 20 MG/1
20 TABLET, COATED ORAL EVERY EVENING
Qty: 30 TABLET | Refills: 11 | Status: SHIPPED | OUTPATIENT
Start: 2024-08-07 | End: 2024-08-16 | Stop reason: SDUPTHER

## 2024-08-07 ASSESSMENT — ENCOUNTER SYMPTOMS
DIZZINESS: 0
PALPITATIONS: 0
COUGH: 0
MYALGIAS: 0
LOSS OF CONSCIOUSNESS: 0
SHORTNESS OF BREATH: 0

## 2024-08-07 ASSESSMENT — FIBROSIS 4 INDEX: FIB4 SCORE: 1.46

## 2024-08-07 NOTE — PROGRESS NOTES
"Chief Complaint   Patient presents with    Hypertension    Shortness of Breath    Chest Pain       Subjective     Parviz Stewart is a 58 y.o. 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    Since 4/18/2024 appointment the patient underwent a PET scan that was abnormal with subsequent coronary angiography demonstrating mild diffuse intimal plaque with a 40% mid LAD stenosis.  He states after the angiogram \"the doctor told me that I could stop the cholesterol medication\".  He has been exercising up to hour and a half a day.  He is dramatically cut back on alcohol consumption.  He denies any angina, shortness of breath or palpitations.    Past Medical History:   Diagnosis Date    ASTHMA 06/14/2024    allergen induced    Bronchitis 06/14/2024    history of    Cataract 06/14/2024    iolou    ETOH abuse     Heart burn     High cholesterol     Hypertension     pt states it is resolved after weight loss     Past Surgical History:   Procedure Laterality Date    OTHER Bilateral 06/14/2024    IOLOU    BICEPS TENDON REPAIR Right 06/22/2018    Procedure: BICEPS TENDON REPAIR - DISTAL;  Surgeon: Ebenezer Mejia M.D.;  Location: SURGERY [facility];  Service: Orthopedics    BHARATHI BY LAPAROSCOPY  01/01/2005    TONSILLECTOMY  01/01/1974     Family History   Problem Relation Age of Onset    Heart Disease Mother     Diabetes Mother     Kidney Disease Mother         on dialysis    Heart Disease Father     Other Father         mechanical injuries    Stroke Brother     Heart Disease Brother      Social " History     Socioeconomic History    Marital status:      Spouse name: Not on file    Number of children: Not on file    Years of education: Not on file    Highest education level: Not on file   Occupational History    Not on file   Tobacco Use    Smoking status: Never    Smokeless tobacco: Never   Vaping Use    Vaping status: Never Used   Substance and Sexual Activity    Alcohol use: Yes     Alcohol/week: 4.8 - 6.0 oz     Types: 5 - 6 Glasses of wine, 3 - 4 Shots of liquor per week    Drug use: Not Currently     Comment: denies    Sexual activity: Not on file   Other Topics Concern    Not on file   Social History Narrative    Not on file     Social Determinants of Health     Financial Resource Strain: Not on file   Food Insecurity: Not on file   Transportation Needs: Not on file   Physical Activity: Not on file   Stress: Not on file   Social Connections: Not on file   Intimate Partner Violence: Not on file   Housing Stability: Not on file     Allergies   Allergen Reactions    Pollen Extract Shortness of Breath     Outpatient Encounter Medications as of 8/7/2024   Medication Sig Dispense Refill    rosuvastatin (CRESTOR) 20 MG Tab Take 1 Tablet by mouth every evening. 30 Tablet 11    ALBUTEROL INH Inhale as needed.      Non Formulary Request Take  by mouth every day. NAC antioxidant      aspirin 81 MG EC tablet Take 81 mg by mouth every day.      OMEGA-3 FATTY ACIDS PO Take  by mouth every day.      Non Formulary Request Take  by mouth every day. Balance of nature, fruits and vegetables      Coenzyme Q10 (COQ10 PO) Take  by mouth every day.      VITAMIN D PO Take  by mouth every day. With K1, K2      Multiple Vitamins-Minerals (HAIR SKIN & NAILS PO) Take  by mouth every day.      multivitamin Tab Take 1 Tablet by mouth every day. Doesn't have copper or iron      Non Formulary Request Take  by mouth every day. Total beets      CREATINE PO Take  by mouth every day.      clomiPHENE (CLOMID) 50 MG tablet        "amLODIPine (NORVASC) 10 MG Tab Take 1 Tablet by mouth every day. (Patient taking differently: Take 10 mg by mouth at bedtime.) 30 Tablet 3    losartan (COZAAR) 50 MG Tab Take 100 mg by mouth every day.      omeprazole (PRILOSEC) 20 MG delayed-release capsule Take 20 mg by mouth 1 time daily as needed.      multivitamin Tab Take 1 Tablet by mouth every day. (Patient not taking: Reported on 8/7/2024)      [DISCONTINUED] rosuvastatin (CRESTOR) 10 MG Tab Take 1 Tablet by mouth every evening. (Patient not taking: Reported on 8/7/2024) 90 Tablet 3     No facility-administered encounter medications on file as of 8/7/2024.     Review of Systems   Respiratory:  Negative for cough and shortness of breath.    Cardiovascular:  Negative for chest pain and palpitations.   Musculoskeletal:  Negative for myalgias.   Neurological:  Negative for dizziness and loss of consciousness.              Objective     /80 (BP Location: Left arm, Patient Position: Sitting, BP Cuff Size: Adult)   Pulse 88   Resp 16   Ht 1.905 m (6' 3\")   Wt (!) 127 kg (279 lb)   SpO2 94%   BMI 34.87 kg/m²     Physical Exam  Eyes:      Conjunctiva/sclera: Conjunctivae normal.      Pupils: Pupils are equal, round, and reactive to light.   Neck:      Vascular: No JVD.   Cardiovascular:      Rate and Rhythm: Normal rate and regular rhythm.      Pulses:           Carotid pulses are 1+ on the right side and 1+ on the left side.       Radial pulses are 1+ on the right side and 1+ on the left side.        Posterior tibial pulses are 1+ on the right side and 1+ on the left side.      Heart sounds: Normal heart sounds.   Pulmonary:      Effort: Pulmonary effort is normal. No accessory muscle usage or respiratory distress.      Breath sounds: Normal breath sounds. No wheezing or rales.   Chest:      Comments: Increased AP diameter  Abdominal:      Comments: Protuberant   Musculoskeletal:      Right lower leg: No edema.      Left lower leg: No edema.   Skin:     " General: Skin is warm and dry.      Findings: No rash.      Nails: There is no clubbing.   Neurological:      Mental Status: He is alert and oriented to person, place, and time.   Psychiatric:         Behavior: Behavior normal.              CARDIAC PET SCAN 4/70/2017  1.  Normal perfusion without evidence for prior infarction or ischemia.  2.  Calculated ejection fraction appears slightly low, however, on visual   inspection, it is estimated at 55%.    ECHOCARDIOGRAM 9/19/2022  The left ventricular ejection fraction is visually estimated to be 55%.  No regional wall motion abnormalities.  Mild regurgitation seen.  The ascending aorta is dilated with a diameter of 4.1 cm.    CARDIAC SCAN/20/2011  Coronary calcification:   LMA - 0   LAD - 1   LCX - 2   RCA - 0   PDA - 0   Calcium Score: 3    CARDIAC SCAN 3/25/2024  Coronary calcification:  LMA - 52.7  LCX - 100.1  LAD - 455.9  RCA - 144.0  PDA - 0.0  Total Calcium Score: 752.7    CARDIAC CATHETERIZATION  Hemodynamics:   Aorta: 99/81 mmHg  LVEDP: 15 mmHg  No significant pullback gradient across the aortic valve     Coronary Anatomy              Left Main: Minimal luminal irregularities              LAD:  40% stenosis in the mid segment              LCx: Minimal luminal irregularities              RCA: Dominant, Minimal luminal irregularities                Left Ventriculography:   LVEF= 50%. Normal wall motion    EKG/60/2023 normal sinus rhythm, rate 83, left anterior fascicular block, personally interpreted    Assessment & Plan     1. Coronary artery disease, non-occlusive        2. Dyslipidemia  LIPID PANEL    rosuvastatin (CRESTOR) 20 MG Tab      3. Primary hypertension        4. Toxic cardiomyopathy (HCC)        5. Obesity (BMI 30-39.9)            Medical Decision Making: Today's Assessment/Status/Plan:   Clinically stable with no heart failure no ischemic symptoms  CAD.  I reviewed the coronary angiogram images and results with the patient indicating that it shows  coronary atherosclerotic disease.  I instructed him and explained the need to restart rosuvastatin.  LDL was 85 on 10 mg.  Will increase to 20 mg.  Target LDL less than 55, at least less than 70.  Recheck lipid panel in 2 months.  Cardiomyopathy probably toxic related to alcohol, recovered no heart failure findings or symptoms.  Instructed patient to continue to abstain from alcohol.  Encouraged him to continue his exercise program  Blood pressure well-controlled on losartan, amlodipine.  Continue aspirin 81 mg daily  Obesity, if unable to adequately lose weight, target BMI less than 30 then would recommend consideration of semaglutide, will defer to PCP Tam Rg MD  RTC 4 months

## 2024-08-16 DIAGNOSIS — E78.5 DYSLIPIDEMIA: ICD-10-CM

## 2024-08-16 NOTE — TELEPHONE ENCOUNTER
Is the patient due for a refill? Yes    Was the patient seen the past year? Yes    Date of last office visit: 8/7/24    Does the patient have an upcoming appointment?  Yes   If yes, When? 12/05/24    Provider to refill:ALYSSA    Does the patient have [facility] and need 100-day supply? (This applies to ALL medications) Patient does not have SCP     90 day supply

## 2024-08-19 RX ORDER — ROSUVASTATIN CALCIUM 20 MG/1
20 TABLET, COATED ORAL EVERY EVENING
Qty: 90 TABLET | Refills: 3 | Status: SHIPPED | OUTPATIENT
Start: 2024-08-19

## 2024-10-03 ENCOUNTER — HOSPITAL ENCOUNTER (OUTPATIENT)
Dept: RADIOLOGY | Facility: MEDICAL CENTER | Age: 59
End: 2024-10-03

## 2024-10-03 ENCOUNTER — APPOINTMENT (OUTPATIENT)
Dept: RADIOLOGY | Facility: MEDICAL CENTER | Age: 59
End: 2024-10-03
Attending: PHYSICIAN ASSISTANT
Payer: COMMERCIAL

## 2024-10-03 DIAGNOSIS — M25.562 ACUTE PAIN OF LEFT KNEE: ICD-10-CM

## 2024-10-03 DIAGNOSIS — M23.92 ACUTE INTERNAL DERANGEMENT OF LEFT KNEE: ICD-10-CM

## 2024-10-03 PROCEDURE — 73721 MRI JNT OF LWR EXTRE W/O DYE: CPT

## 2024-10-03 PROCEDURE — 73720 MRI LWR EXTREMITY W/O&W/DYE: CPT | Mod: LT

## 2024-10-03 PROCEDURE — A9579 GAD-BASE MR CONTRAST NOS,1ML: HCPCS | Mod: JZ | Performed by: PHYSICIAN ASSISTANT

## 2024-10-03 PROCEDURE — 700117 HCHG RX CONTRAST REV CODE 255: Mod: JZ | Performed by: PHYSICIAN ASSISTANT

## 2024-10-03 RX ADMIN — GADOTERIDOL 20 ML: 279.3 INJECTION, SOLUTION INTRAVENOUS at 10:30

## 2024-11-01 ENCOUNTER — TELEPHONE (OUTPATIENT)
Dept: CARDIOLOGY | Facility: MEDICAL CENTER | Age: 59
End: 2024-11-01
Payer: COMMERCIAL

## 2024-11-01 NOTE — TELEPHONE ENCOUNTER
"ALYSSA    Caller:  Parviz Stewart    Topic/issue:   Parviz would like to start taking the \"manalleyrro:(he believes that is the name) medication and would like to get a script for it.     Callback Number: [phone]    Thank you,   Digna ZAMORANO"

## 2024-11-01 NOTE — TELEPHONE ENCOUNTER
Per  last OV notes 08/07/24:    Obesity, if unable to adequately lose weight, target BMI less than 30 then would recommend consideration of semaglutide, will defer to PCP Tam Rg MD    Called pt and advised above. He confirmed the medication he was inquiring about is Mounjaro. Semaglutide (Ozempic) per  recommended per notes above however pt will follow up with PCP for further evaluation and what medication is best for him. He verbalized understanding and was appreciative of call.

## 2024-11-25 ENCOUNTER — TELEPHONE (OUTPATIENT)
Dept: CARDIOLOGY | Facility: MEDICAL CENTER | Age: 59
End: 2024-11-25
Payer: COMMERCIAL

## 2024-11-25 NOTE — TELEPHONE ENCOUNTER
Called pt in regards to lab work that was ordered at previous OV.No answer, LVM to call back. Pt has follow up appointment scheduled with SW on 12/5/24.

## 2024-12-05 ENCOUNTER — APPOINTMENT (OUTPATIENT)
Dept: CARDIOLOGY | Facility: MEDICAL CENTER | Age: 59
End: 2024-12-05
Attending: INTERNAL MEDICINE
Payer: COMMERCIAL

## 2025-05-13 ENCOUNTER — PHARMACY VISIT (OUTPATIENT)
Dept: PHARMACY | Facility: MEDICAL CENTER | Age: 60
End: 2025-05-13
Payer: COMMERCIAL

## 2025-05-13 ENCOUNTER — HOSPITAL ENCOUNTER (EMERGENCY)
Facility: MEDICAL CENTER | Age: 60
End: 2025-05-13
Attending: EMERGENCY MEDICINE
Payer: COMMERCIAL

## 2025-05-13 VITALS
OXYGEN SATURATION: 93 % | BODY MASS INDEX: 35.99 KG/M2 | HEART RATE: 70 BPM | WEIGHT: 289.46 LBS | SYSTOLIC BLOOD PRESSURE: 138 MMHG | RESPIRATION RATE: 20 BRPM | DIASTOLIC BLOOD PRESSURE: 79 MMHG | TEMPERATURE: 97.6 F | HEIGHT: 75 IN

## 2025-05-13 DIAGNOSIS — M54.17 LUMBOSACRAL RADICULOPATHY: ICD-10-CM

## 2025-05-13 DIAGNOSIS — G89.29 ACUTE EXACERBATION OF CHRONIC LOW BACK PAIN: ICD-10-CM

## 2025-05-13 DIAGNOSIS — M54.50 ACUTE EXACERBATION OF CHRONIC LOW BACK PAIN: ICD-10-CM

## 2025-05-13 LAB
ALBUMIN SERPL BCP-MCNC: 4.1 G/DL (ref 3.2–4.9)
ALBUMIN/GLOB SERPL: 1.1 G/DL
ALP SERPL-CCNC: 63 U/L (ref 30–99)
ALT SERPL-CCNC: 35 U/L (ref 2–50)
ANION GAP SERPL CALC-SCNC: 16 MMOL/L (ref 7–16)
APPEARANCE UR: CLEAR
AST SERPL-CCNC: 39 U/L (ref 12–45)
BACTERIA #/AREA URNS HPF: ABNORMAL /HPF
BASOPHILS # BLD AUTO: 0.7 % (ref 0–1.8)
BASOPHILS # BLD: 0.08 K/UL (ref 0–0.12)
BILIRUB SERPL-MCNC: 0.9 MG/DL (ref 0.1–1.5)
BILIRUB UR QL STRIP.AUTO: NEGATIVE
BUN SERPL-MCNC: 23 MG/DL (ref 8–22)
CALCIUM ALBUM COR SERPL-MCNC: 9 MG/DL (ref 8.5–10.5)
CALCIUM SERPL-MCNC: 9.1 MG/DL (ref 8.5–10.5)
CASTS URNS QL MICRO: ABNORMAL /LPF (ref 0–2)
CHLORIDE SERPL-SCNC: 100 MMOL/L (ref 96–112)
CO2 SERPL-SCNC: 23 MMOL/L (ref 20–33)
COLOR UR: ABNORMAL
CREAT SERPL-MCNC: 1.44 MG/DL (ref 0.5–1.4)
EOSINOPHIL # BLD AUTO: 0.08 K/UL (ref 0–0.51)
EOSINOPHIL NFR BLD: 0.7 % (ref 0–6.9)
EPITHELIAL CELLS 1715: ABNORMAL /HPF (ref 0–5)
ERYTHROCYTE [DISTWIDTH] IN BLOOD BY AUTOMATED COUNT: 45.4 FL (ref 35.9–50)
GFR SERPLBLD CREATININE-BSD FMLA CKD-EPI: 55 ML/MIN/1.73 M 2
GLOBULIN SER CALC-MCNC: 3.8 G/DL (ref 1.9–3.5)
GLUCOSE SERPL-MCNC: 99 MG/DL (ref 65–99)
GLUCOSE UR STRIP.AUTO-MCNC: NEGATIVE MG/DL
HCT VFR BLD AUTO: 53.8 % (ref 42–52)
HGB BLD-MCNC: 18.3 G/DL (ref 14–18)
IMM GRANULOCYTES # BLD AUTO: 0.04 K/UL (ref 0–0.11)
IMM GRANULOCYTES NFR BLD AUTO: 0.4 % (ref 0–0.9)
KETONES UR STRIP.AUTO-MCNC: 15 MG/DL
LEUKOCYTE ESTERASE UR QL STRIP.AUTO: ABNORMAL
LIPASE SERPL-CCNC: 19 U/L (ref 11–82)
LYMPHOCYTES # BLD AUTO: 2.4 K/UL (ref 1–4.8)
LYMPHOCYTES NFR BLD: 21.8 % (ref 22–41)
MCH RBC QN AUTO: 28.4 PG (ref 27–33)
MCHC RBC AUTO-ENTMCNC: 34 G/DL (ref 32.3–36.5)
MCV RBC AUTO: 83.4 FL (ref 81.4–97.8)
MICRO URNS: ABNORMAL
MONOCYTES # BLD AUTO: 0.6 K/UL (ref 0–0.85)
MONOCYTES NFR BLD AUTO: 5.4 % (ref 0–13.4)
NEUTROPHILS # BLD AUTO: 7.83 K/UL (ref 1.82–7.42)
NEUTROPHILS NFR BLD: 71 % (ref 44–72)
NITRITE UR QL STRIP.AUTO: NEGATIVE
NRBC # BLD AUTO: 0 K/UL
NRBC BLD-RTO: 0 /100 WBC (ref 0–0.2)
PH UR STRIP.AUTO: 5 [PH] (ref 5–8)
PLATELET # BLD AUTO: 211 K/UL (ref 164–446)
PMV BLD AUTO: 8.8 FL (ref 9–12.9)
POTASSIUM SERPL-SCNC: 4.1 MMOL/L (ref 3.6–5.5)
PROT SERPL-MCNC: 7.9 G/DL (ref 6–8.2)
PROT UR QL STRIP: 30 MG/DL
RBC # BLD AUTO: 6.45 M/UL (ref 4.7–6.1)
RBC # URNS HPF: ABNORMAL /HPF (ref 0–2)
RBC UR QL AUTO: NEGATIVE
SODIUM SERPL-SCNC: 139 MMOL/L (ref 135–145)
SP GR UR STRIP.AUTO: 1.02
UROBILINOGEN UR STRIP.AUTO-MCNC: 1 EU/DL
WBC # BLD AUTO: 11 K/UL (ref 4.8–10.8)
WBC #/AREA URNS HPF: ABNORMAL /HPF

## 2025-05-13 PROCEDURE — 80053 COMPREHEN METABOLIC PANEL: CPT

## 2025-05-13 PROCEDURE — 99285 EMERGENCY DEPT VISIT HI MDM: CPT

## 2025-05-13 PROCEDURE — 96374 THER/PROPH/DIAG INJ IV PUSH: CPT

## 2025-05-13 PROCEDURE — 83690 ASSAY OF LIPASE: CPT

## 2025-05-13 PROCEDURE — 85025 COMPLETE CBC W/AUTO DIFF WBC: CPT

## 2025-05-13 PROCEDURE — RXMED WILLOW AMBULATORY MEDICATION CHARGE: Performed by: EMERGENCY MEDICINE

## 2025-05-13 PROCEDURE — 96375 TX/PRO/DX INJ NEW DRUG ADDON: CPT

## 2025-05-13 PROCEDURE — 700105 HCHG RX REV CODE 258: Performed by: EMERGENCY MEDICINE

## 2025-05-13 PROCEDURE — 81001 URINALYSIS AUTO W/SCOPE: CPT

## 2025-05-13 PROCEDURE — 36415 COLL VENOUS BLD VENIPUNCTURE: CPT

## 2025-05-13 PROCEDURE — 700111 HCHG RX REV CODE 636 W/ 250 OVERRIDE (IP): Mod: JZ | Performed by: EMERGENCY MEDICINE

## 2025-05-13 RX ORDER — DIAZEPAM 10 MG/2ML
5 INJECTION, SOLUTION INTRAMUSCULAR; INTRAVENOUS ONCE
Status: COMPLETED | OUTPATIENT
Start: 2025-05-13 | End: 2025-05-13

## 2025-05-13 RX ORDER — KETOROLAC TROMETHAMINE 15 MG/ML
15 INJECTION, SOLUTION INTRAMUSCULAR; INTRAVENOUS ONCE
Status: COMPLETED | OUTPATIENT
Start: 2025-05-13 | End: 2025-05-13

## 2025-05-13 RX ORDER — PREDNISONE 20 MG/1
40 TABLET ORAL DAILY
Qty: 10 TABLET | Refills: 0 | Status: SHIPPED | OUTPATIENT
Start: 2025-05-13 | End: 2025-05-18

## 2025-05-13 RX ORDER — METHYLPREDNISOLONE SODIUM SUCCINATE 125 MG/2ML
125 INJECTION, POWDER, LYOPHILIZED, FOR SOLUTION INTRAMUSCULAR; INTRAVENOUS ONCE
Status: COMPLETED | OUTPATIENT
Start: 2025-05-13 | End: 2025-05-13

## 2025-05-13 RX ORDER — SODIUM CHLORIDE 9 MG/ML
1000 INJECTION, SOLUTION INTRAVENOUS ONCE
Status: COMPLETED | OUTPATIENT
Start: 2025-05-13 | End: 2025-05-13

## 2025-05-13 RX ORDER — METHOCARBAMOL 500 MG/1
500 TABLET, FILM COATED ORAL EVERY 6 HOURS PRN
Qty: 20 TABLET | Refills: 0 | Status: SHIPPED | OUTPATIENT
Start: 2025-05-13

## 2025-05-13 RX ORDER — IBUPROFEN 600 MG/1
600 TABLET, FILM COATED ORAL EVERY 6 HOURS PRN
Qty: 20 TABLET | Refills: 0 | Status: SHIPPED | OUTPATIENT
Start: 2025-05-13

## 2025-05-13 RX ADMIN — KETOROLAC TROMETHAMINE 15 MG: 15 INJECTION, SOLUTION INTRAMUSCULAR; INTRAVENOUS at 09:31

## 2025-05-13 RX ADMIN — DIAZEPAM 5 MG: 10 INJECTION, SOLUTION INTRAMUSCULAR; INTRAVENOUS at 09:32

## 2025-05-13 RX ADMIN — METHYLPREDNISOLONE SODIUM SUCCINATE 125 MG: 125 INJECTION INTRAMUSCULAR; INTRAVENOUS at 09:29

## 2025-05-13 RX ADMIN — SODIUM CHLORIDE 1000 ML: 9 INJECTION, SOLUTION INTRAVENOUS at 09:28

## 2025-05-13 ASSESSMENT — FIBROSIS 4 INDEX: FIB4 SCORE: 1.48

## 2025-05-13 ASSESSMENT — PAIN DESCRIPTION - PAIN TYPE: TYPE: ACUTE PAIN

## 2025-05-13 NOTE — ED TRIAGE NOTES
Chief Complaint   Patient presents with    Abdominal Pain     LLQ pain x 3 days. Worst when laying down or sitting    Low Back Pain     Left lower back pain

## 2025-05-13 NOTE — DISCHARGE PLANNING
TCN following. HTH/SCP chart review completed.  Per chart review, note pt currently in ED 2' to abdominal pain (LLQ pain x 3 days. Worst when laying down or sitting) and low back pain (left lower back pain).     Per chart review, patient has a Non-[facility] PCP.  Note pt does not have primary/follow up visit scheduled. Per review, he is ambulatory.   At this time anticipating that pt will dc to home (either directly from ED or after admission to [facility] if warranted).     If patient does not warrant admission/ inpatient status to [facility] and is unable to functionally discharge home, please reach out to TCN for assist with SCP auth to discharge directly from ED to skilled.     If patient admits to [facility], TCN will continue to monitor and assist with transitional care needs as indicated. Please reach out to TCN via [facility] if post acute transitional care needs are warranted for dc planning.

## 2025-05-13 NOTE — ED NOTES
Pt educated on discharge instructions. All questions & concerns addressed. Vitals re-assessed and at pt's baseline.     IV d/c'd     Pt ambulates to exit with steady gait and all belongings.

## 2025-05-13 NOTE — DISCHARGE INSTRUCTIONS
Follow-up with primary care this week for reevaluation, medication management and referral for additional imaging/MRI, physical therapy or specialty evaluation if back pain persists.    Prednisone daily for 5 days.  Motrin every 6 hours as needed for discomfort  Robaxin every 6 hours as needed for pain or spasm.    Weightbearing and light activity as tolerated.  Slow stretching and range of motion exercises encouraged.    Return to the emergency department for persistent worsening low back pain, lower extremity weakness or paresthesias, incontinence or urinary retention, fever, abdominal pain or other new.

## 2025-05-13 NOTE — ED NOTES
Patient and family report pain worst and patient unable to sit or stand without pain. Charge nurse aware.

## 2025-05-13 NOTE — ED NOTES
Pt ambulates to and from restroom to attempt to collect UA. Pt unable to collect. Per ERP ok to have oral fluids. Will come speak with pt shortly.

## 2025-05-13 NOTE — ED PROVIDER NOTES
ED Provider Note    CHIEF COMPLAINT  Chief Complaint   Patient presents with    Abdominal Pain     LLQ pain x 3 days. Worst when laying down or sitting    Low Back Pain     Left lower back pain        EXTERNAL RECORDS REVIEWED  Outpatient Notes [place] [facility] for shoulder injury in 11/5/2024.  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  Clinically stable without heart failure or ischemic symptoms.  CAD.  Dyslipidemia.  Blood pressure well-controlled on losartan and amlodipine.  Obesity, encouraged to adequately lose weight.    HPI/ROS  LIMITATION TO HISTORY   Select: : None  OUTSIDE HISTORIAN(S):  None    Milton Stewart is a 59 y.o. male who presents to the emergency department through triage for low back pain.  Patient describes chronic history of pain, recurring with known disease.  States normally he has symptoms radiating to the right low back and right had increased time traveling in a car/road trip a couple of weeks ago which triggered his low back pain, then slept in a chair late last week which has made this significantly buttock, groin and lower extremity.  Denies weakness or paresthesias.  Denies incontinence or urinary retention.  Denies abdominal pain or syncope.  Patient does have history of alcohol abuse, and was binge drinking last week for which she had decreased activity and again, more time spent sitting or falling asleep in a chair which she believes triggered these symptoms.  Denies other trauma or fall.  Denies IV drug use or fever.  Massage, Tylenol, ibuprofen and vitamin IV were not helpful.      PAST MEDICAL HISTORY   has a past medical history of ASTHMA (06/14/2024), Bronchitis (06/14/2024), Cataract (06/14/2024), ETOH abuse, Heart burn, High cholesterol, and Hypertension.    SURGICAL HISTORY   has a past surgical history that includes woody  "by laparoscopy (01/01/2005); tonsillectomy (01/01/1974); biceps tendon repair (Right, 06/22/2018); and other (Bilateral, 06/14/2024).    FAMILY HISTORY  Family History   Problem Relation Age of Onset    Heart Disease Mother     Diabetes Mother     Kidney Disease Mother         on dialysis    Heart Disease Father     Other Father         mechanical injuries    Stroke Brother     Heart Disease Brother        SOCIAL HISTORY  Social History     Tobacco Use    Smoking status: Never    Smokeless tobacco: Never   Vaping Use    Vaping status: Never Used   Substance and Sexual Activity    Alcohol use: Yes     Alcohol/week: 1.8 - 2.4 oz     Types: 3 - 4 Shots of liquor per week    Drug use: Not Currently     Comment: denies    Sexual activity: Not on file       CURRENT MEDICATIONS  Home Medications       Reviewed by Liset Pineda R.N. (Registered Nurse) on 05/13/25 at 0782  Med List Status: Not Addressed     Medication Last Dose Status   ALBUTEROL INH  Active   amLODIPine (NORVASC) 10 MG Tab  Active   aspirin 81 MG EC tablet  Active   clomiPHENE (CLOMID) 50 MG tablet  Active   Coenzyme Q10 (COQ10 PO)  Active   CREATINE PO  Active   losartan (COZAAR) 50 MG Tab  Active   Multiple Vitamins-Minerals (HAIR SKIN & NAILS PO)  Active   multivitamin Tab  Active   multivitamin Tab  Active   Non Formulary Request  Active   Non Formulary Request  Active   Non Formulary Request  Active   OMEGA-3 FATTY ACIDS PO  Active   omeprazole (PRILOSEC) 20 MG delayed-release capsule  Active   rosuvastatin (CRESTOR) 20 MG Tab  Active   VITAMIN D PO  Active                    ALLERGIES  Allergies   Allergen Reactions    Pollen Extract Shortness of Breath       PHYSICAL EXAM  VITAL SIGNS: /79   Pulse 70   Temp 36.4 °C (97.6 °F) (Temporal)   Resp 20   Ht 1.905 m (6' 3\")   Wt (!) 131 kg (289 lb 7.4 oz)   SpO2 93%   BMI 36.18 kg/m²    Pulse ox interpretation: I interpret this pulse ox as normal.  Constitutional: Alert in no apparent distress. "  Morbidly obese  HENT: Normocephalic, atraumatic. Bilateral external ears normal, Nose normal. Moist mucous membranes.    Eyes: Pupils are equal and reactive, Conjunctiva normal.   Neck: Normal range of motion, Supple  Lymphatic: No lymphadenopathy noted.   Cardiovascular: Regular rate and rhythm, no murmurs. Distal pulses intact.  Thorax & Lungs: Normal breath sounds.  No wheezing/rales/ronchi. No increased work of breathing, clipped speech or retractions.   Abdomen: Obese, soft, non-distended, non-tender to palpation. No palpable or pulsatile masses. No peritoneal signs. No CVA tenderness.  Skin: Warm, Dry, No erythema, No rash.   Musculoskeletal: Good range of motion in all major joints. No major deformities noted.   Neurologic: Alert and orient x 4.  Speech clear and cohesive.  5 out of 5 strength bilateral lower extremities with proximal distal muscle groups.  Sensation intact to light touch, no saddle anesthesia.  2+ patellar DTR bilaterally.  Psychiatric: Affect normal, Judgment normal, Mood normal.       EKG/LABS  Results for orders placed or performed during the hospital encounter of 05/13/25   CBC WITH DIFFERENTIAL    Collection Time: 05/13/25  8:03 AM   Result Value Ref Range    WBC 11.0 (H) 4.8 - 10.8 K/uL    RBC 6.45 (H) 4.70 - 6.10 M/uL    Hemoglobin 18.3 (H) 14.0 - 18.0 g/dL    Hematocrit 53.8 (H) 42.0 - 52.0 %    MCV 83.4 81.4 - 97.8 fL    MCH 28.4 27.0 - 33.0 pg    MCHC 34.0 32.3 - 36.5 g/dL    RDW 45.4 35.9 - 50.0 fL    Platelet Count 211 164 - 446 K/uL    MPV 8.8 (L) 9.0 - 12.9 fL    Neutrophils-Polys 71.00 44.00 - 72.00 %    Lymphocytes 21.80 (L) 22.00 - 41.00 %    Monocytes 5.40 0.00 - 13.40 %    Eosinophils 0.70 0.00 - 6.90 %    Basophils 0.70 0.00 - 1.80 %    Immature Granulocytes 0.40 0.00 - 0.90 %    Nucleated RBC 0.00 0.00 - 0.20 /100 WBC    Neutrophils (Absolute) 7.83 (H) 1.82 - 7.42 K/uL    Lymphs (Absolute) 2.40 1.00 - 4.80 K/uL    Monos (Absolute) 0.60 0.00 - 0.85 K/uL    Eos (Absolute)  0.08 0.00 - 0.51 K/uL    Baso (Absolute) 0.08 0.00 - 0.12 K/uL    Immature Granulocytes (abs) 0.04 0.00 - 0.11 K/uL    NRBC (Absolute) 0.00 K/uL   COMP METABOLIC PANEL    Collection Time: 05/13/25  8:03 AM   Result Value Ref Range    Sodium 139 135 - 145 mmol/L    Potassium 4.1 3.6 - 5.5 mmol/L    Chloride 100 96 - 112 mmol/L    Co2 23 20 - 33 mmol/L    Anion Gap 16.0 7.0 - 16.0    Glucose 99 65 - 99 mg/dL    Bun 23 (H) 8 - 22 mg/dL    Creatinine 1.44 (H) 0.50 - 1.40 mg/dL    Calcium 9.1 8.5 - 10.5 mg/dL    Correct Calcium 9.0 8.5 - 10.5 mg/dL    AST(SGOT) 39 12 - 45 U/L    ALT(SGPT) 35 2 - 50 U/L    Alkaline Phosphatase 63 30 - 99 U/L    Total Bilirubin 0.9 0.1 - 1.5 mg/dL    Albumin 4.1 3.2 - 4.9 g/dL    Total Protein 7.9 6.0 - 8.2 g/dL    Globulin 3.8 (H) 1.9 - 3.5 g/dL    A-G Ratio 1.1 g/dL   LIPASE    Collection Time: 05/13/25  8:03 AM   Result Value Ref Range    Lipase 19 11 - 82 U/L   ESTIMATED GFR    Collection Time: 05/13/25  8:03 AM   Result Value Ref Range    GFR (CKD-EPI) 55 (A) >60 mL/min/1.73 m 2   URINALYSIS    Collection Time: 05/13/25 11:20 AM    Specimen: Urine, Clean Catch   Result Value Ref Range    Color Dark Yellow     Character Clear     Specific Gravity 1.021 <1.035    Ph 5.0 5.0 - 8.0    Glucose Negative Negative mg/dL    Ketones 15 (A) Negative mg/dL    Protein 30 (A) Negative mg/dL    Bilirubin Negative Negative    Urobilinogen, Urine 1.0 <=1.0 EU/dL    Nitrite Negative Negative    Leukocyte Esterase Trace (A) Negative    Occult Blood Negative Negative    Micro Urine Req Microscopic    URINE MICROSCOPIC (W/UA)    Collection Time: 05/13/25 11:20 AM   Result Value Ref Range    WBC 3-5 (A) /hpf    RBC 0-2 0 - 2 /hpf    Bacteria None Seen None /hpf    Epithelial Cells 3-5 0 - 5 /hpf    Urine Casts 6-10 (A) 0 - 2 /lpf         COURSE & MEDICAL DECISION MAKING    ASSESSMENT, COURSE AND PLAN  Care Narrative:   9:16 AM seen evaluated bedside.  Clinically well-appearing and nontoxic.  Chronic and  recurring low back pain although normally radiates to the right, now to the left.  Denies any new trauma.  He did have a long distance car ride and slept in a chair which she describes as known triggers for his discomfort.  He is neurologically intact and nonfocal.  He has pain without weakness or paresthesias no incontinence or retention.  Patient adamantly denies abdominal pain and exam is benign.  Hemodynamically stable.  Labs per protocol prior to my evaluation are pending.  Otherwise add Toradol, Valium, Decadron and reevaluate.    Mild nonspecific leukocytosis without left shift or bandemia.  No anemia although he is likely hemoconcentrated and also his creatinine of 1.44.  Add IV fluid, this likely secondary to his increased alcohol use and decrease other oral food and fluid intake lately.  Liver function is normal.  Urinalysis without blood or infection.  History is not consistent with ureteral colic.    Pain improved with ED medications although not resolved.  He is ambulatory and moving more comfortably, ambulates to the uneven stretches without resistance.      ADDITIONAL PROBLEMS MANAGED  Alcohol dependence  Cardiomyopathy, heart failure    DISPOSITION AND DISCUSSIONS  ED evaluation most suggestive of acute exacerbation of chronic lumbosacral pain with radiculopathy.  He is otherwise neurologically intact and nonfocal.  No history or clinical evidence to suggest cauda equina, epidural abscess or other spinal cord compression syndrome.  Pain improved with Toradol, Solu-Medrol and Valium.  Will continue Motrin, prednisone and Robaxin at home this week.  Encouraged outpatient follow-up for further evaluation if symptoms persist.  He bears weight and ambulates independently in the emergency department.  Mild NATANAEL likely secondary to lifestyle choices, received IV fluid bolus with symptom improvement.  History of heart failure with a recent EF had normalized.  Patient again encouraged to abstain from  alcohol.    Discussion of management with other [facility] or appropriate source(s): None     Escalation of care considered, and ultimately not performed:diagnostic imaging and acute inpatient care management, however at this time, the patient is most appropriate for outpatient management    Barriers to care at this time, including but not limited to: None.     Decision tools and prescription drugs considered including, but not limited to: Pain Medications narcotics not indicated in the setting .    The patient is stable for discharge home, anticipatory guidance provided, prednisone for 5 days, Robaxin for pain or spasm, Motrin for discomfort, close follow-up is encouraged and strict return instructions have been discussed. Patient is agreeable to the disposition and plan.      FINAL DIAGNOSIS  1. Lumbosacral radiculopathy    2. Acute exacerbation of chronic low back pain         Electronically signed by: Ct Hdz D.O., 5/13/2025 9:16 AM

## (undated) DEVICE — BLOCK

## (undated) DEVICE — CANISTER SUCTION RIGID RED 1500CC (40EA/CA)

## (undated) DEVICE — BLADE SURGICAL #15 - (50/BX 3BX/CA)

## (undated) DEVICE — GLOVE BIOGEL SZ 7.5 SURGICAL PF LTX - (50PR/BX 4BX/CA)

## (undated) DEVICE — GOWN SURGICAL X-LARGE ULTRA - FILM-REINFORCED (20/CA)

## (undated) DEVICE — SPLINT PLASTER 5 IN X 30 IN - (50EA/BX 6BX/CA)

## (undated) DEVICE — SUTURE 2-0 VICRYL PLUS CT-1 - 8 X 18 INCH(12/BX)

## (undated) DEVICE — SUTURE 3-0 PROLENE PS-1 (12PK/BX)

## (undated) DEVICE — DRAPE LARGE 3 QUARTER - (20/CA)

## (undated) DEVICE — STERI STRIP COMPOUND BENZOIN - TINCTURE 0.6ML WITH APPLICATOR (40EA/BX)

## (undated) DEVICE — COVER LIGHT HANDLE FLEXIBLE - SOFT (2EA/PK 80PK/CA)

## (undated) DEVICE — CHLORAPREP 26 ML APPLICATOR - ORANGE TINT(25/CA)

## (undated) DEVICE — WRAP CO-FLEX 4IN X 5YD STERIL - SELF-ADHERENT (18/CA)

## (undated) DEVICE — DRESSING XEROFORM 1X8 - (50/BX 4BX/CA)

## (undated) DEVICE — SENSOR SPO2 NEO LNCS ADHESIVE (20/BX) SEE USER NOTES

## (undated) DEVICE — CLOSURE SKIN STRIP 1/2 X 4 IN - (STERI STRIP) (50/BX 4BX/CA)

## (undated) DEVICE — NEPTUNE 4 PORT MANIFOLD - (20/PK)

## (undated) DEVICE — SPLINT PLASTER 3 IN X 15 IN - (50/BX 12BX/CA)

## (undated) DEVICE — TUBING CLEARLINK DUO-VENT - C-FLO (48EA/CA)

## (undated) DEVICE — STOCKINET BIAS 4 IN STERILE - (20/CA)

## (undated) DEVICE — HEAD HOLDER JUNIOR/ADULT

## (undated) DEVICE — GLOVE BIOGEL SZ 8 SURGICAL PF LTX - (50PR/BX 4BX/CA)

## (undated) DEVICE — GLOVE BIOGEL SZ 7 SURGICAL PF LTX - (50PR/BX 4BX/CA)

## (undated) DEVICE — SET EXTENSION WITH 2 PORTS (48EA/CA) ***PART #2C8610 IS A SUBSTITUTE*****

## (undated) DEVICE — PAD PREP 24 X 48 CUFFED - (100/CA)

## (undated) DEVICE — SUTURE GENERAL

## (undated) DEVICE — PACK LOWER EXTREMITY - (2/CA)

## (undated) DEVICE — ELECTRODE DUAL RETURN W/ CORD - (50/PK)

## (undated) DEVICE — GLOVE BIOGEL PI ULTRATOUCH SZ 7.5 SURGICAL PF LF -(50/BX 4BX/CA)

## (undated) DEVICE — KIT ROOM DECONTAMINATION

## (undated) DEVICE — GLOVE BIOGEL INDICATOR SZ 8 SURGICAL PF LTX - (50/BX 4BX/CA)

## (undated) DEVICE — GLOVE, LITE (PAIR)

## (undated) DEVICE — SUCTION INSTRUMENT YANKAUER BULBOUS TIP W/O VENT (50EA/CA)

## (undated) DEVICE — PROTECTOR ULNA NERVE - (36PR/CA)

## (undated) DEVICE — PADDING CAST 4 IN STERILE - 4 X 4 YDS (24/CA)

## (undated) DEVICE — SODIUM CHL IRRIGATION 0.9% 1000ML (12EA/CA)